# Patient Record
Sex: FEMALE | Race: WHITE | Employment: OTHER | URBAN - METROPOLITAN AREA
[De-identification: names, ages, dates, MRNs, and addresses within clinical notes are randomized per-mention and may not be internally consistent; named-entity substitution may affect disease eponyms.]

---

## 2017-01-04 ENCOUNTER — TELEPHONE (OUTPATIENT)
Dept: FAMILY MEDICINE CLINIC | Age: 77
End: 2017-01-04

## 2017-01-06 ENCOUNTER — OFFICE VISIT (OUTPATIENT)
Dept: URGENT CARE | Age: 77
End: 2017-01-06

## 2017-01-06 VITALS
BODY MASS INDEX: 23.21 KG/M2 | SYSTOLIC BLOOD PRESSURE: 110 MMHG | DIASTOLIC BLOOD PRESSURE: 64 MMHG | HEART RATE: 80 BPM | OXYGEN SATURATION: 98 % | RESPIRATION RATE: 12 BRPM | TEMPERATURE: 98.7 F | WEIGHT: 131 LBS | HEIGHT: 63 IN

## 2017-01-06 DIAGNOSIS — J06.9 VIRAL UPPER RESPIRATORY ILLNESS: Primary | ICD-10-CM

## 2017-01-06 PROCEDURE — 99202 OFFICE O/P NEW SF 15 MIN: CPT | Performed by: PHYSICIAN ASSISTANT

## 2017-01-06 RX ORDER — METHYLPREDNISOLONE 4 MG/1
TABLET ORAL
Qty: 1 KIT | Refills: 0 | Status: SHIPPED | OUTPATIENT
Start: 2017-01-06 | End: 2017-01-23 | Stop reason: ALTCHOICE

## 2017-01-06 ASSESSMENT — ENCOUNTER SYMPTOMS
NAUSEA: 0
WHEEZING: 0
COUGH: 1
DIARRHEA: 0
SORE THROAT: 1
SPUTUM PRODUCTION: 0
SHORTNESS OF BREATH: 0
VOMITING: 0
ABDOMINAL PAIN: 0

## 2017-01-19 ENCOUNTER — TELEPHONE (OUTPATIENT)
Dept: FAMILY MEDICINE CLINIC | Age: 77
End: 2017-01-19

## 2017-01-23 ENCOUNTER — OFFICE VISIT (OUTPATIENT)
Dept: FAMILY MEDICINE CLINIC | Age: 77
End: 2017-01-23

## 2017-01-23 VITALS
WEIGHT: 140 LBS | HEIGHT: 63 IN | HEART RATE: 77 BPM | SYSTOLIC BLOOD PRESSURE: 114 MMHG | BODY MASS INDEX: 24.8 KG/M2 | OXYGEN SATURATION: 97 % | DIASTOLIC BLOOD PRESSURE: 72 MMHG | TEMPERATURE: 98.3 F | RESPIRATION RATE: 16 BRPM

## 2017-01-23 DIAGNOSIS — J01.10 ACUTE FRONTAL SINUSITIS, RECURRENCE NOT SPECIFIED: Primary | ICD-10-CM

## 2017-01-23 DIAGNOSIS — R53.83 FATIGUE, UNSPECIFIED TYPE: ICD-10-CM

## 2017-01-23 DIAGNOSIS — D64.9 ANEMIA, UNSPECIFIED TYPE: ICD-10-CM

## 2017-01-23 LAB
HCT VFR BLD CALC: 35.1 % (ref 36–48)
HEMOGLOBIN: 11.3 G/DL (ref 12–16)
MCH RBC QN AUTO: 26.2 PG (ref 26–34)
MCHC RBC AUTO-ENTMCNC: 32.3 G/DL (ref 31–36)
MCV RBC AUTO: 81 FL (ref 80–100)
PDW BLD-RTO: 14.2 % (ref 12.4–15.4)
PLATELET # BLD: 318 K/UL (ref 135–450)
PMV BLD AUTO: 9 FL (ref 5–10.5)
RBC # BLD: 4.33 M/UL (ref 4–5.2)
WBC # BLD: 5.9 K/UL (ref 4–11)

## 2017-01-23 PROCEDURE — 99213 OFFICE O/P EST LOW 20 MIN: CPT | Performed by: FAMILY MEDICINE

## 2017-01-23 PROCEDURE — 36415 COLL VENOUS BLD VENIPUNCTURE: CPT | Performed by: FAMILY MEDICINE

## 2017-01-23 RX ORDER — AZITHROMYCIN 250 MG/1
TABLET, FILM COATED ORAL
Qty: 1 PACKET | Refills: 0 | Status: SHIPPED | OUTPATIENT
Start: 2017-01-23 | End: 2017-01-31 | Stop reason: ALTCHOICE

## 2017-01-23 RX ORDER — FLUTICASONE PROPIONATE 50 MCG
SPRAY, SUSPENSION (ML) NASAL
Qty: 1 BOTTLE | Refills: 5 | Status: SHIPPED | OUTPATIENT
Start: 2017-01-23 | End: 2017-01-31 | Stop reason: SDUPTHER

## 2017-01-23 ASSESSMENT — ENCOUNTER SYMPTOMS
WHEEZING: 0
SINUS PRESSURE: 1
COUGH: 1
APNEA: 0
TROUBLE SWALLOWING: 0
STRIDOR: 0
EYE ITCHING: 1
SORE THROAT: 1
FACIAL SWELLING: 0
RHINORRHEA: 1

## 2017-01-31 ENCOUNTER — OFFICE VISIT (OUTPATIENT)
Dept: DERMATOLOGY | Age: 77
End: 2017-01-31

## 2017-01-31 DIAGNOSIS — L57.0 ACTINIC KERATOSES: Primary | ICD-10-CM

## 2017-01-31 DIAGNOSIS — L30.9 DERMATITIS: ICD-10-CM

## 2017-01-31 DIAGNOSIS — Z12.83 SCREENING EXAM FOR SKIN CANCER: ICD-10-CM

## 2017-01-31 PROCEDURE — 17000 DESTRUCT PREMALG LESION: CPT | Performed by: DERMATOLOGY

## 2017-01-31 PROCEDURE — 99214 OFFICE O/P EST MOD 30 MIN: CPT | Performed by: DERMATOLOGY

## 2017-01-31 PROCEDURE — 17003 DESTRUCT PREMALG LES 2-14: CPT | Performed by: DERMATOLOGY

## 2017-01-31 RX ORDER — FLUOCINONIDE 0.5 MG/G
OINTMENT TOPICAL
Qty: 30 G | Refills: 1 | Status: SHIPPED | OUTPATIENT
Start: 2017-01-31 | End: 2019-03-01 | Stop reason: SDUPTHER

## 2017-02-06 ENCOUNTER — OFFICE VISIT (OUTPATIENT)
Dept: FAMILY MEDICINE CLINIC | Age: 77
End: 2017-02-06

## 2017-02-06 VITALS
HEIGHT: 63 IN | OXYGEN SATURATION: 96 % | TEMPERATURE: 98.8 F | SYSTOLIC BLOOD PRESSURE: 116 MMHG | RESPIRATION RATE: 16 BRPM | HEART RATE: 60 BPM | DIASTOLIC BLOOD PRESSURE: 74 MMHG

## 2017-02-06 DIAGNOSIS — J30.2 SEASONAL ALLERGIC RHINITIS, UNSPECIFIED ALLERGIC RHINITIS TRIGGER: ICD-10-CM

## 2017-02-06 DIAGNOSIS — Z86.69 HISTORY OF ITCHING OF EYE: Primary | ICD-10-CM

## 2017-02-06 PROCEDURE — 99213 OFFICE O/P EST LOW 20 MIN: CPT | Performed by: FAMILY MEDICINE

## 2017-02-06 RX ORDER — TOBRAMYCIN 3 MG/ML
2 SOLUTION/ DROPS OPHTHALMIC EVERY 4 HOURS
Qty: 5 ML | Refills: 1 | Status: SHIPPED | OUTPATIENT
Start: 2017-02-06 | End: 2017-02-06 | Stop reason: SDUPTHER

## 2017-02-06 RX ORDER — TOBRAMYCIN 3 MG/ML
2 SOLUTION/ DROPS OPHTHALMIC EVERY 4 HOURS
Qty: 5 ML | Refills: 1 | Status: SHIPPED | OUTPATIENT
Start: 2017-02-06 | End: 2018-06-08

## 2017-02-06 ASSESSMENT — ENCOUNTER SYMPTOMS
RHINORRHEA: 1
PHOTOPHOBIA: 0
EYE DISCHARGE: 0
EYE ITCHING: 1
EYE PAIN: 0
EYE REDNESS: 1
FACIAL SWELLING: 0

## 2017-02-13 ENCOUNTER — OFFICE VISIT (OUTPATIENT)
Dept: ORTHOPEDIC SURGERY | Age: 77
End: 2017-02-13

## 2017-02-13 VITALS
HEIGHT: 62 IN | SYSTOLIC BLOOD PRESSURE: 130 MMHG | BODY MASS INDEX: 25.76 KG/M2 | DIASTOLIC BLOOD PRESSURE: 74 MMHG | HEART RATE: 78 BPM | WEIGHT: 140 LBS

## 2017-02-13 DIAGNOSIS — M20.41 HAMMER TOE OF RIGHT FOOT: Primary | ICD-10-CM

## 2017-02-13 PROCEDURE — 99212 OFFICE O/P EST SF 10 MIN: CPT | Performed by: PODIATRIST

## 2017-03-20 ENCOUNTER — OFFICE VISIT (OUTPATIENT)
Dept: ORTHOPEDIC SURGERY | Age: 77
End: 2017-03-20

## 2017-03-20 VITALS
DIASTOLIC BLOOD PRESSURE: 77 MMHG | HEIGHT: 62 IN | SYSTOLIC BLOOD PRESSURE: 130 MMHG | WEIGHT: 130 LBS | BODY MASS INDEX: 23.92 KG/M2 | HEART RATE: 69 BPM

## 2017-03-20 DIAGNOSIS — M17.0 ARTHRITIS OF BOTH KNEES: Primary | ICD-10-CM

## 2017-03-20 PROCEDURE — 99213 OFFICE O/P EST LOW 20 MIN: CPT | Performed by: ORTHOPAEDIC SURGERY

## 2017-03-21 ENCOUNTER — TELEPHONE (OUTPATIENT)
Dept: ORTHOPEDIC SURGERY | Age: 77
End: 2017-03-21

## 2017-04-28 ENCOUNTER — NURSE ONLY (OUTPATIENT)
Dept: ORTHOPEDIC SURGERY | Age: 77
End: 2017-04-28

## 2017-04-28 VITALS
HEIGHT: 62 IN | SYSTOLIC BLOOD PRESSURE: 129 MMHG | DIASTOLIC BLOOD PRESSURE: 75 MMHG | WEIGHT: 130 LBS | HEART RATE: 85 BPM | BODY MASS INDEX: 23.92 KG/M2

## 2017-04-28 DIAGNOSIS — M17.0 PRIMARY OSTEOARTHRITIS OF BOTH KNEES: Primary | ICD-10-CM

## 2017-04-28 PROCEDURE — 20610 DRAIN/INJ JOINT/BURSA W/O US: CPT | Performed by: PHYSICIAN ASSISTANT

## 2017-05-04 ENCOUNTER — NURSE ONLY (OUTPATIENT)
Dept: ORTHOPEDIC SURGERY | Age: 77
End: 2017-05-04

## 2017-05-04 VITALS — HEIGHT: 62 IN | WEIGHT: 130 LBS | BODY MASS INDEX: 23.92 KG/M2

## 2017-05-04 DIAGNOSIS — M17.0 PRIMARY OSTEOARTHRITIS OF BOTH KNEES: Primary | ICD-10-CM

## 2017-05-04 PROCEDURE — 20610 DRAIN/INJ JOINT/BURSA W/O US: CPT | Performed by: PHYSICIAN ASSISTANT

## 2017-05-12 ENCOUNTER — NURSE ONLY (OUTPATIENT)
Dept: ORTHOPEDIC SURGERY | Age: 77
End: 2017-05-12

## 2017-05-12 VITALS — BODY MASS INDEX: 24.45 KG/M2 | WEIGHT: 138 LBS | HEIGHT: 63 IN

## 2017-05-12 DIAGNOSIS — M17.0 PRIMARY OSTEOARTHRITIS OF BOTH KNEES: Primary | ICD-10-CM

## 2017-05-12 PROCEDURE — 20610 DRAIN/INJ JOINT/BURSA W/O US: CPT | Performed by: PHYSICIAN ASSISTANT

## 2017-07-06 ENCOUNTER — TELEPHONE (OUTPATIENT)
Dept: DERMATOLOGY | Age: 77
End: 2017-07-06

## 2017-08-14 ENCOUNTER — OFFICE VISIT (OUTPATIENT)
Dept: FAMILY MEDICINE CLINIC | Age: 77
End: 2017-08-14

## 2017-08-14 VITALS
DIASTOLIC BLOOD PRESSURE: 78 MMHG | WEIGHT: 140 LBS | SYSTOLIC BLOOD PRESSURE: 122 MMHG | TEMPERATURE: 97.9 F | HEART RATE: 80 BPM | RESPIRATION RATE: 16 BRPM | BODY MASS INDEX: 25.76 KG/M2 | OXYGEN SATURATION: 98 % | HEIGHT: 62 IN

## 2017-08-14 DIAGNOSIS — M25.562 CHRONIC PAIN OF LEFT KNEE: ICD-10-CM

## 2017-08-14 DIAGNOSIS — I65.22 STENOSIS OF LEFT CAROTID ARTERY: ICD-10-CM

## 2017-08-14 DIAGNOSIS — G89.29 CHRONIC PAIN OF LEFT KNEE: ICD-10-CM

## 2017-08-14 DIAGNOSIS — D64.9 ANEMIA, UNSPECIFIED TYPE: Primary | ICD-10-CM

## 2017-08-14 PROCEDURE — 99213 OFFICE O/P EST LOW 20 MIN: CPT | Performed by: FAMILY MEDICINE

## 2017-08-14 RX ORDER — DICLOFENAC SODIUM 75 MG/1
75 TABLET, DELAYED RELEASE ORAL 2 TIMES DAILY
Qty: 60 TABLET | Refills: 5 | Status: SHIPPED | OUTPATIENT
Start: 2017-08-14 | End: 2017-08-28 | Stop reason: ALTCHOICE

## 2017-08-25 ENCOUNTER — HOSPITAL ENCOUNTER (OUTPATIENT)
Dept: OTHER | Age: 77
Discharge: HOME | End: 2017-08-25
Attending: FAMILY MEDICINE | Admitting: FAMILY MEDICINE

## 2017-08-25 LAB
A/G RATIO: 1.2 (ref 1.1–2.2)
ALBUMIN SERPL-MCNC: 3.8 G/DL (ref 3.4–5)
ALP BLD-CCNC: 51 U/L (ref 40–129)
ALT SERPL-CCNC: 12 U/L (ref 10–40)
ANION GAP SERPL CALCULATED.3IONS-SCNC: 13 MMOL/L (ref 3–16)
AST SERPL-CCNC: 18 U/L (ref 15–37)
BASOPHILS ABSOLUTE: 0.1 K/UL (ref 0–0.2)
BASOPHILS RELATIVE PERCENT: 1.1 %
BILIRUB SERPL-MCNC: 0.3 MG/DL (ref 0–1)
BUN BLDV-MCNC: 19 MG/DL (ref 7–20)
CALCIUM SERPL-MCNC: 9.1 MG/DL (ref 8.3–10.6)
CHLORIDE BLD-SCNC: 103 MMOL/L (ref 99–110)
CHOLESTEROL, TOTAL: 186 MG/DL (ref 0–199)
CO2: 26 MMOL/L (ref 21–32)
CREAT SERPL-MCNC: 0.6 MG/DL (ref 0.6–1.2)
EOSINOPHILS ABSOLUTE: 0.5 K/UL (ref 0–0.6)
EOSINOPHILS RELATIVE PERCENT: 8.9 %
GFR AFRICAN AMERICAN: >60
GFR NON-AFRICAN AMERICAN: >60
GLOBULIN: 3.3 G/DL
GLUCOSE BLD-MCNC: 65 MG/DL (ref 70–99)
HCT VFR BLD CALC: 36.5 % (ref 36–48)
HDLC SERPL-MCNC: 70 MG/DL (ref 40–60)
HEMOGLOBIN: 11.9 G/DL (ref 12–16)
LDL CHOLESTEROL CALCULATED: 101 MG/DL
LYMPHOCYTES ABSOLUTE: 1.5 K/UL (ref 1–5.1)
LYMPHOCYTES RELATIVE PERCENT: 26.2 %
MCH RBC QN AUTO: 26.5 PG (ref 26–34)
MCHC RBC AUTO-ENTMCNC: 32.6 G/DL (ref 31–36)
MCV RBC AUTO: 81.4 FL (ref 80–100)
MONOCYTES ABSOLUTE: 0.6 K/UL (ref 0–1.3)
MONOCYTES RELATIVE PERCENT: 9.5 %
NEUTROPHILS ABSOLUTE: 3.2 K/UL (ref 1.7–7.7)
NEUTROPHILS RELATIVE PERCENT: 54.3 %
PDW BLD-RTO: 16.1 % (ref 12.4–15.4)
PLATELET # BLD: 259 K/UL (ref 135–450)
PMV BLD AUTO: 9.5 FL (ref 5–10.5)
POTASSIUM SERPL-SCNC: 3.9 MMOL/L (ref 3.5–5.1)
RBC # BLD: 4.48 M/UL (ref 4–5.2)
SODIUM BLD-SCNC: 142 MMOL/L (ref 136–145)
TOTAL PROTEIN: 7.1 G/DL (ref 6.4–8.2)
TRIGL SERPL-MCNC: 74 MG/DL (ref 0–150)
VLDLC SERPL CALC-MCNC: 15 MG/DL
WBC # BLD: 5.8 K/UL (ref 4–11)

## 2017-08-28 ENCOUNTER — OFFICE VISIT (OUTPATIENT)
Dept: DERMATOLOGY | Age: 77
End: 2017-08-28

## 2017-08-28 DIAGNOSIS — L82.1 SEBORRHEIC KERATOSES: ICD-10-CM

## 2017-08-28 DIAGNOSIS — Z41.1 ELECTIVE PROCEDURE FOR UNACCEPTABLE COSMETIC APPEARANCE: ICD-10-CM

## 2017-08-28 DIAGNOSIS — L57.0 ACTINIC KERATOSES: Primary | ICD-10-CM

## 2017-08-28 PROCEDURE — 1711000 COSMETIC-DESTRUCT B9 LESION 1-14: Performed by: DERMATOLOGY

## 2017-08-28 PROCEDURE — 17000 DESTRUCT PREMALG LESION: CPT | Performed by: DERMATOLOGY

## 2017-08-28 PROCEDURE — 17003 DESTRUCT PREMALG LES 2-14: CPT | Performed by: DERMATOLOGY

## 2017-09-18 ENCOUNTER — HOSPITAL ENCOUNTER (OUTPATIENT)
Dept: MAMMOGRAPHY | Age: 77
Discharge: HOME | End: 2017-09-18
Admitting: FAMILY MEDICINE

## 2017-09-18 DIAGNOSIS — Z12.31 ENCOUNTER FOR SCREENING MAMMOGRAM FOR BREAST CANCER: ICD-10-CM

## 2017-09-18 DIAGNOSIS — M85.80 OSTEOPENIA, UNSPECIFIED LOCATION: ICD-10-CM

## 2017-12-18 ENCOUNTER — OFFICE VISIT (OUTPATIENT)
Dept: URGENT CARE | Age: 77
End: 2017-12-18

## 2017-12-18 VITALS
DIASTOLIC BLOOD PRESSURE: 70 MMHG | WEIGHT: 142 LBS | SYSTOLIC BLOOD PRESSURE: 120 MMHG | TEMPERATURE: 98.5 F | RESPIRATION RATE: 20 BRPM | HEIGHT: 63 IN | HEART RATE: 72 BPM | BODY MASS INDEX: 25.16 KG/M2

## 2017-12-18 DIAGNOSIS — R10.84 GENERALIZED ABDOMINAL PAIN: Primary | ICD-10-CM

## 2017-12-18 LAB
BILIRUBIN, POC: ABNORMAL
BLOOD URINE, POC: ABNORMAL
CLARITY, POC: CLEAR
COLOR, POC: ABNORMAL
GLUCOSE URINE, POC: ABNORMAL
KETONES, POC: ABNORMAL
LEUKOCYTE EST, POC: ABNORMAL
NITRITE, POC: ABNORMAL
PH, POC: 6.5
PROTEIN, POC: ABNORMAL
SPECIFIC GRAVITY, POC: 1.01
UROBILINOGEN, POC: 0.2

## 2017-12-18 PROCEDURE — 81002 URINALYSIS NONAUTO W/O SCOPE: CPT | Performed by: EMERGENCY MEDICINE

## 2017-12-18 PROCEDURE — 99214 OFFICE O/P EST MOD 30 MIN: CPT | Performed by: EMERGENCY MEDICINE

## 2017-12-18 RX ORDER — CIPROFLOXACIN 500 MG/1
500 TABLET, FILM COATED ORAL 2 TIMES DAILY
Qty: 20 TABLET | Refills: 0 | Status: SHIPPED | OUTPATIENT
Start: 2017-12-18 | End: 2017-12-28

## 2017-12-18 ASSESSMENT — ENCOUNTER SYMPTOMS
VOMITING: 0
CONSTIPATION: 1
HEMATOCHEZIA: 0
DIARRHEA: 0
NAUSEA: 0
BACK PAIN: 0
COUGH: 0
EYES NEGATIVE: 1
FLATUS: 1
ABDOMINAL PAIN: 1
SHORTNESS OF BREATH: 0
BLOOD IN STOOL: 0

## 2017-12-18 NOTE — PATIENT INSTRUCTIONS
RX: CIPRO    Follow-up with your primary care physician in 1 week. If you do not have a primary care physician, call [phone] for a referral or visit [email or web address]      Patient Education        Abdominal Pain: Care Instructions  Your Care Instructions    Abdominal pain has many possible causes. Some aren't serious and get better on their own in a few days. Others need more testing and treatment. If your pain continues or gets worse, you need to be rechecked and may need more tests to find out what is wrong. You may need surgery to correct the problem. Don't ignore new symptoms, such as fever, nausea and vomiting, urination problems, pain that gets worse, and dizziness. These may be signs of a more serious problem. Your doctor may have recommended a follow-up visit in the next 8 to 12 hours. If you are not getting better, you may need more tests or treatment. The doctor has checked you carefully, but problems can develop later. If you notice any problems or new symptoms, get medical treatment right away. Follow-up care is a key part of your treatment and safety. Be sure to make and go to all appointments, and call your doctor if you are having problems. It's also a good idea to know your test results and keep a list of the medicines you take. How can you care for yourself at home? · Rest until you feel better. · To prevent dehydration, drink plenty of fluids, enough so that your urine is light yellow or clear like water. Choose water and other caffeine-free clear liquids until you feel better. If you have kidney, heart, or liver disease and have to limit fluids, talk with your doctor before you increase the amount of fluids you drink. · If your stomach is upset, eat mild foods, such as rice, dry toast or crackers, bananas, and applesauce. Try eating several small meals instead of two or three large ones.   · Wait until 48 hours after all symptoms have gone away before you have spicy foods, alcohol, and drinks that contain caffeine. · Do not eat foods that are high in fat. · Avoid anti-inflammatory medicines such as aspirin, ibuprofen (Advil, Motrin), and naproxen (Aleve). These can cause stomach upset. Talk to your doctor if you take daily aspirin for another health problem. When should you call for help? Call 911 anytime you think you may need emergency care. For example, call if:  ? · You passed out (lost consciousness). ? · You pass maroon or very bloody stools. ? · You vomit blood or what looks like coffee grounds. ? · You have new, severe belly pain. ?Call your doctor now or seek immediate medical care if:  ? · Your pain gets worse, especially if it becomes focused in one area of your belly. ? · You have a new or higher fever. ? · Your stools are black and look like tar, or they have streaks of blood. ? · You have unexpected vaginal bleeding. ? · You have symptoms of a urinary tract infection. These may include:  ¨ Pain when you urinate. ¨ Urinating more often than usual.  ¨ Blood in your urine. ? · You are dizzy or lightheaded, or you feel like you may faint. ? Watch closely for changes in your health, and be sure to contact your doctor if:  ? · You are not getting better after 1 day (24 hours). Where can you learn more? Go to [email or web address]://[email or web address]. org and sign in to your appssavvy account. Enter T908 in the [facility] box to learn more about \"Abdominal Pain: Care Instructions. \"     If you do not have an account, please click on the \"Sign Up Now\" link. Current as of: March 20, 2017  Content Version: 11.4  © 0453-3719 [employer]. Care instructions adapted under license by [facility] ([facility]). If you have questions about a medical condition or this instruction, always ask your healthcare professional. [address] any warranty or liability for your use of this information.

## 2017-12-18 NOTE — PROGRESS NOTES
Reason for Visit:   Chief Complaint   Patient presents with    Abdominal Pain     keeps coming back stomach ache, for the 3 weeks. Patient History     HPI:    Abdominal Pain   This is a new problem. Episode onset: over the past 3 weeks. The problem occurs intermittently. The problem has been waxing and waning. The pain is located in the generalized abdominal region. The pain is mild. The quality of the pain is aching, dull and cramping. Associated symptoms include constipation and flatus. Pertinent negatives include no anorexia, diarrhea, dysuria, fever, frequency, headaches, hematochezia, hematuria, melena, nausea or vomiting. Nothing aggravates the pain. The pain is relieved by nothing. Treatments tried: Pepcid complete. The treatment provided no relief. There is no history of abdominal surgery. Past Medical History:   Diagnosis Date    Arthritis     Asthma     mild    Knee pain, right     PONV (postoperative nausea and vomiting)     Reflux     Seasonal allergies        Past Surgical History:   Procedure Laterality Date    COLONOSCOPY  2009    COLONOSCOPY  9/17/14    polyp    DILATION AND CURETTAGE OF UTERUS      FOOT SURGERY Bilateral [id number]    rt ft 3rd digit proximalinterphalangeal joint arthrodesis/lt  ft 5th/2nd proximal interphalangeal joint arthrodesis    KNEE ARTHROSCOPY      TONSILLECTOMY      UPPER GASTROINTESTINAL ENDOSCOPY  3/15/13       Allergies: Allergies   Allergen Reactions    Cephalexin      Vomiting    Escitalopram Oxalate      Headache    Erythromycin Nausea And Vomiting         Review of Systems       Review of Systems   Constitutional: Negative for chills and fever. HENT: Negative. Eyes: Negative. Respiratory: Negative for cough and shortness of breath. Cardiovascular: Negative for chest pain. Gastrointestinal: Positive for abdominal pain, constipation and flatus.  Negative for anorexia, blood in stool, diarrhea, hematochezia, melena, nausea and vomiting. Genitourinary: Negative for difficulty urinating, dysuria, frequency and hematuria. Musculoskeletal: Negative for back pain. Skin: Negative for rash. Neurological: Negative for headaches. Physical Exam     Vitals:    12/18/17 1217   BP: 120/70   Pulse: 72   Resp: 20   Temp: 98.5 °F (36.9 °C)       Physical Exam   Constitutional: She is oriented to person, place, and time. She appears well-developed and well-nourished. No distress. Cardiovascular: Normal rate, regular rhythm and normal heart sounds. Exam reveals no gallop and no friction rub. No murmur heard. Pulmonary/Chest: Effort normal and breath sounds normal. No accessory muscle usage. No respiratory distress. She has no decreased breath sounds. She has no wheezes. She has no rhonchi. She has no rales. Abdominal: Soft. Bowel sounds are normal. She exhibits no distension and no mass. There is no hepatosplenomegaly. There is no tenderness. There is no rigidity, no rebound and no guarding. Neurological: She is alert and oriented to person, place, and time. She has normal strength. No sensory deficit. Skin: Skin is warm and dry. No rash noted. Psychiatric: She has a normal mood and affect. Her behavior is normal.       Procedure:     Results for POC orders placed in visit on 12/18/17   POCT Urinalysis no Micro   Result Value Ref Range    Color, UA dark yellow     Clarity, UA clear     Glucose, UA POC neg     Bilirubin, UA neg     Ketones, UA neg     Spec Grav, UA 1.015     Blood, UA POC moderate     pH, UA 6.5     Protein, UA POC neg     Urobilinogen, UA 0.2     Leukocytes, UA small     Nitrite, UA neg        Urine culture is pending      Assessment:    1. Generalized abdominal pain        Will treat for UTI.   Urine culture is pending      I estimate there is LOW risk for ACUTE APPENDICITIS, BOWEL OBSTRUCTION, CHOLECYSTITIS, DIVERTICULITIS, INCARCERATED HERNIA, PANCREATITIS, or PERFORATED BOWEL or ULCER, thus I consider the discharge disposition reasonable. Also, there is no evidence or peritonitis, sepsis, or toxicity. The patient and/or family and I have discussed the diagnosis and risks, and we agree with discharging home to follow-up with their primary doctor. We also discussed going to the Emergency Department immediately if new or worsening symptoms occur. We have discussed the symptoms which are most concerning (e.g., bloody stool, fever, changing or worsening pain, vomiting) that necessitate immediate return. Plan:    Orders Placed This Encounter   Medications    ciprofloxacin (CIPRO) 500 MG tablet     Sig: Take 1 tablet by mouth 2 times daily for 10 days     Dispense:  20 tablet     Refill:  0         Patient Instructions     RX: CIPRO    Follow-up with your primary care physician in 1 week. If you do not have a primary care physician, call [phone] for a referral or visit [email or web address]      Patient Education        Abdominal Pain: Care Instructions  Your Care Instructions    Abdominal pain has many possible causes. Some aren't serious and get better on their own in a few days. Others need more testing and treatment. If your pain continues or gets worse, you need to be rechecked and may need more tests to find out what is wrong. You may need surgery to correct the problem. Don't ignore new symptoms, such as fever, nausea and vomiting, urination problems, pain that gets worse, and dizziness. These may be signs of a more serious problem. Your doctor may have recommended a follow-up visit in the next 8 to 12 hours. If you are not getting better, you may need more tests or treatment. The doctor has checked you carefully, but problems can develop later. If you notice any problems or new symptoms, get medical treatment right away. Follow-up care is a key part of your treatment and safety.  Be sure to make and go to all appointments, and call your health, and be sure to contact your doctor if:  ? · You are not getting better after 1 day (24 hours). Where can you learn more? Go to https://[email or web address] [email or web address]. org and sign in to your [employer] account. Enter G012 in the [employer] box to learn more about \"Abdominal Pain: Care Instructions. \"     If you do not have an account, please click on the \"Sign Up Now\" link. Current as of: March 20, 2017  Content Version: 11.4  © 5610-9295 Healthwise, Incorporated. Care instructions adapted under license by [facility] ([facility]). If you have questions about a medical condition or this instruction, always ask your healthcare professional. [address] any warranty or liability for your use of this information.

## 2017-12-20 LAB — URINE CULTURE, ROUTINE: NORMAL

## 2017-12-29 ENCOUNTER — OFFICE VISIT (OUTPATIENT)
Dept: FAMILY MEDICINE CLINIC | Age: 77
End: 2017-12-29

## 2017-12-29 VITALS
TEMPERATURE: 98.4 F | WEIGHT: 144 LBS | OXYGEN SATURATION: 97 % | DIASTOLIC BLOOD PRESSURE: 78 MMHG | BODY MASS INDEX: 26.5 KG/M2 | HEART RATE: 84 BPM | HEIGHT: 62 IN | SYSTOLIC BLOOD PRESSURE: 130 MMHG

## 2017-12-29 DIAGNOSIS — R31.9 URINARY TRACT INFECTION WITH HEMATURIA, SITE UNSPECIFIED: ICD-10-CM

## 2017-12-29 DIAGNOSIS — B96.89 ACUTE BACTERIAL SINUSITIS: Primary | ICD-10-CM

## 2017-12-29 DIAGNOSIS — N39.0 URINARY TRACT INFECTION WITH HEMATURIA, SITE UNSPECIFIED: ICD-10-CM

## 2017-12-29 DIAGNOSIS — J01.90 ACUTE BACTERIAL SINUSITIS: Primary | ICD-10-CM

## 2017-12-29 LAB
BILIRUBIN, POC: ABNORMAL
BLOOD URINE, POC: ABNORMAL
CLARITY, POC: CLEAR
COLOR, POC: YELLOW
GLUCOSE URINE, POC: ABNORMAL
KETONES, POC: ABNORMAL
LEUKOCYTE EST, POC: ABNORMAL
NITRITE, POC: ABNORMAL
PH, POC: 7.5
PROTEIN, POC: ABNORMAL
SPECIFIC GRAVITY, POC: 1.01
UROBILINOGEN, POC: 0.2

## 2017-12-29 PROCEDURE — 99214 OFFICE O/P EST MOD 30 MIN: CPT | Performed by: FAMILY MEDICINE

## 2017-12-29 PROCEDURE — 81002 URINALYSIS NONAUTO W/O SCOPE: CPT | Performed by: FAMILY MEDICINE

## 2017-12-29 RX ORDER — AMOXICILLIN AND CLAVULANATE POTASSIUM 875; 125 MG/1; MG/1
1 TABLET, FILM COATED ORAL 2 TIMES DAILY
Qty: 20 TABLET | Refills: 0 | Status: SHIPPED | OUTPATIENT
Start: 2017-12-29 | End: 2018-01-08

## 2017-12-29 ASSESSMENT — ENCOUNTER SYMPTOMS
SINUS PRESSURE: 1
COUGH: 1
HOARSE VOICE: 0
SHORTNESS OF BREATH: 0
SWOLLEN GLANDS: 1
SORE THROAT: 1

## 2017-12-29 NOTE — PROGRESS NOTES
exhibits no discharge. No scleral icterus. Neck: Normal range of motion. Neck supple. No tracheal deviation present. No thyromegaly present. Cardiovascular: Normal rate, regular rhythm and normal heart sounds. Exam reveals no gallop and no friction rub. No murmur heard. Pulmonary/Chest: Effort normal and breath sounds normal. No respiratory distress. She has no wheezes. She has no rales. She exhibits no tenderness. Lymphadenopathy:     She has cervical adenopathy. Skin: No rash noted. She is not diaphoretic. Nursing note and vitals reviewed. Assessment:      Sinusitis, recent UTI      Plan:      Augmentin and supportive care over-the-counter for the sinusitis. Drink plenty fluids. The UA was completely normal except for microscopic blood. Given that the patient is asymptomatic I would recommend having the urine repeated when she establishes care with Dr. Nurys Silva. Mireille Bonilla was seen today for cough, generalized body aches and headache. Diagnoses and all orders for this visit:    Acute bacterial sinusitis    Urinary tract infection with hematuria, site unspecified  -     POCT Urinalysis no Micro    Other orders  -     amoxicillin-clavulanate (AUGMENTIN) 875-125 MG per tablet;  Take 1 tablet by mouth 2 times daily for 10 days

## 2018-01-22 ENCOUNTER — OFFICE VISIT (OUTPATIENT)
Dept: FAMILY MEDICINE CLINIC | Age: 78
End: 2018-01-22

## 2018-01-22 VITALS
HEART RATE: 74 BPM | WEIGHT: 144 LBS | DIASTOLIC BLOOD PRESSURE: 69 MMHG | SYSTOLIC BLOOD PRESSURE: 120 MMHG | BODY MASS INDEX: 26.5 KG/M2 | HEIGHT: 62 IN

## 2018-01-22 DIAGNOSIS — Z23 NEED FOR 23-POLYVALENT PNEUMOCOCCAL POLYSACCHARIDE VACCINE: ICD-10-CM

## 2018-01-22 DIAGNOSIS — J45.30 MILD PERSISTENT ASTHMA WITHOUT COMPLICATION: ICD-10-CM

## 2018-01-22 DIAGNOSIS — R31.9 HEMATURIA, UNSPECIFIED TYPE: Primary | ICD-10-CM

## 2018-01-22 LAB
BILIRUBIN, POC: ABNORMAL
BLOOD URINE, POC: ABNORMAL
CLARITY, POC: CLEAR
COLOR, POC: YELLOW
EPITHELIAL CELLS, UA: 1 /HPF (ref 0–5)
GLUCOSE URINE, POC: ABNORMAL
HYALINE CASTS: 1 /LPF (ref 0–8)
KETONES, POC: ABNORMAL
LEUKOCYTE EST, POC: ABNORMAL
NITRITE, POC: ABNORMAL
PH, POC: 7
PROTEIN, POC: ABNORMAL
RBC UA: 1 /HPF (ref 0–4)
SPECIFIC GRAVITY, POC: 1.01
UROBILINOGEN, POC: 0.2
WBC UA: 0 /HPF (ref 0–5)

## 2018-01-22 PROCEDURE — 90732 PPSV23 VACC 2 YRS+ SUBQ/IM: CPT | Performed by: FAMILY MEDICINE

## 2018-01-22 PROCEDURE — G0009 ADMIN PNEUMOCOCCAL VACCINE: HCPCS | Performed by: FAMILY MEDICINE

## 2018-01-22 PROCEDURE — 81002 URINALYSIS NONAUTO W/O SCOPE: CPT | Performed by: FAMILY MEDICINE

## 2018-01-22 PROCEDURE — 99214 OFFICE O/P EST MOD 30 MIN: CPT | Performed by: FAMILY MEDICINE

## 2018-01-22 RX ORDER — CLOBETASOL PROPIONATE 0.05 MG/G
GEL TOPICAL
COMMUNITY
Start: 2017-11-30 | End: 2019-04-03 | Stop reason: ALTCHOICE

## 2018-01-22 ASSESSMENT — ENCOUNTER SYMPTOMS
SHORTNESS OF BREATH: 0
ABDOMINAL PAIN: 0

## 2018-01-22 ASSESSMENT — PATIENT HEALTH QUESTIONNAIRE - PHQ9
SUM OF ALL RESPONSES TO PHQ9 QUESTIONS 1 & 2: 0
SUM OF ALL RESPONSES TO PHQ QUESTIONS 1-9: 0
2. FEELING DOWN, DEPRESSED OR HOPELESS: 0
1. LITTLE INTEREST OR PLEASURE IN DOING THINGS: 0

## 2018-01-22 NOTE — PROGRESS NOTES
Chief Complaint   Patient presents with    Establish Care    Hematuria     (hx) requesting ua         HPI      65 y.o. female presents today to [facility]. Patient feels well and has no acute complaints. She had a UA 12/18/17 which showed blood in her urine at that time she was treated for a UTI. UA was repeated 12/29/17 and the hematuria was still present. Today it is present as well. Denies history of smoking. She denies any vaginal bleeding. Reports some whitish-clear discharge. She denies any nausea, vomiting, dysuria or abdominal pain. Hx of asthma. Using the albuterol and advair every other day. Son who lives with her has 2 cats and she is allergic to cats.       Work 3 days a week as a     Patient Active Problem List   Diagnosis    AR (allergic rhinitis)    Asthma    Recurrent UTI    Hammer toe    Anemia    Hematuria    Left hamstring muscle strain    Numbness and tingling in right hand    Left knee pain    Hammer toe of right foot     Past Medical History:   Diagnosis Date    Arthritis     Asthma     mild    Knee pain, right     PONV (postoperative nausea and vomiting)     Reflux     Seasonal allergies        Past Surgical History:   Procedure Laterality Date    COLONOSCOPY  2009    COLONOSCOPY  9/17/14    polyp    DILATION AND CURETTAGE OF UTERUS      FOOT SURGERY Bilateral [id number]    rt ft 3rd digit proximalinterphalangeal joint arthrodesis/lt  ft 5th/2nd proximal interphalangeal joint arthrodesis    KNEE ARTHROSCOPY      TONSILLECTOMY      UPPER GASTROINTESTINAL ENDOSCOPY  3/15/13     Most Recent Immunizations   Administered Date(s) Administered    Influenza Virus Vaccine 10/23/2016    Influenza, High Dose 10/30/2017    Pneumococcal 13-valent Conjugate ([id number]) 11/11/2015    Tdap (Boostrix, Adacel) 06/12/2014    Zoster 11/04/2012        Current Outpatient Prescriptions   Medication Sig Dispense Refill    CALCIUM PO Take by mouth      Multiple

## 2018-01-22 NOTE — PATIENT INSTRUCTIONS
have pus in your urine. ¨ You have pain in your back just below your rib cage. This is called flank pain. ¨ You have a fever, chills, or body aches. ¨ It hurts to urinate. ¨ You have groin or belly pain. ? · You have more blood in your urine. ? Watch closely for changes in your health, and be sure to contact your doctor if:  ? · You have new urination problems. ? · You do not get better as expected. Where can you learn more? Go to [email or web address]://[email or web address] org and sign in to your avocadostore account. Enter P455 in the [employer] box to learn more about \"Blood in the Urine: Care Instructions. \"     If you do not have an account, please click on the \"Sign Up Now\" link. Current as of: May 12, 2017  Content Version: 11.5  © 9768-6024 Healthwise, [employer]olution. Care instructions adapted under license by [facility] ([facility]). If you have questions about a medical condition or this instruction, always ask your healthcare professional. [address] any warranty or liability for your use of this information.

## 2018-02-12 ENCOUNTER — OFFICE VISIT (OUTPATIENT)
Dept: DERMATOLOGY | Age: 78
End: 2018-02-12

## 2018-02-12 DIAGNOSIS — L57.0 ACTINIC KERATOSES: Primary | ICD-10-CM

## 2018-02-12 DIAGNOSIS — Z12.83 SCREENING EXAM FOR SKIN CANCER: ICD-10-CM

## 2018-02-12 PROCEDURE — 17000 DESTRUCT PREMALG LESION: CPT | Performed by: DERMATOLOGY

## 2018-02-12 PROCEDURE — 17003 DESTRUCT PREMALG LES 2-14: CPT | Performed by: DERMATOLOGY

## 2018-02-12 PROCEDURE — 99213 OFFICE O/P EST LOW 20 MIN: CPT | Performed by: DERMATOLOGY

## 2018-02-12 NOTE — PROGRESS NOTES
[facility]) Dermatology  Remedios Cummings, [address]  1940    65 y.o. female     Date of Visit: 2/12/2018    Last Visit: 1yr    Chief Complaint: Skin check    History of Present Illness:  1. Here for skin check. History of actinic keratoses s/p cryotherapy. Reports rough lesions on face.   -Avoids sun as much as possible. Derm History:   -Dermatitis - lidex oint (no improvement w/ clobetasol gel)    Review of Systems:  Constitutional: Reports general sense of well-being. Skin: No interval severe sunburns. Allergies: Reviewed and updated. Past Medical History, Surgical History, Medications and Allergies reviewed.      Past Medical History:   Diagnosis Date    Arthritis     Asthma     mild    Knee pain, right     PONV (postoperative nausea and vomiting)     Reflux     Seasonal allergies      Past Surgical History:   Procedure Laterality Date    COLONOSCOPY  2009    COLONOSCOPY  9/17/14    polyp    DILATION AND CURETTAGE OF UTERUS      FOOT SURGERY Bilateral [id number]    rt ft 3rd digit proximalinterphalangeal joint arthrodesis/lt  ft 5th/2nd proximal interphalangeal joint arthrodesis    KNEE ARTHROSCOPY      TONSILLECTOMY      UPPER GASTROINTESTINAL ENDOSCOPY  3/15/13       Allergies   Allergen Reactions    Cephalexin      Vomiting    Escitalopram Oxalate      Headache    Erythromycin Nausea And Vomiting     Outpatient Prescriptions Marked as Taking for the 2/12/18 encounter (Office Visit) with Remedios Cummings MD   Medication Sig Dispense Refill    CALCIUM PO Take by mouth      Multiple Vitamins-Minerals (CENTRUM SILVER 50+WOMEN) TABS Take by mouth      ADVAIR DISKUS 100-50 MCG/DOSE diskus inhaler INHALE ONE PUFF BY MOUTH EVERY 12 HOURS 1 Inhaler 1    tobramycin (TOBREX) 0.3 % ophthalmic solution Place 2 drops into both eyes every 4 hours 5 mL 1    albuterol (VENTOLIN HFA) 108 (90 BASE) MCG/ACT inhaler Inhale 2 puffs into the lungs every 4 hours as needed for

## 2018-02-26 ENCOUNTER — OFFICE VISIT (OUTPATIENT)
Dept: FAMILY MEDICINE CLINIC | Age: 78
End: 2018-02-26

## 2018-02-26 VITALS
TEMPERATURE: 97.6 F | HEART RATE: 71 BPM | HEIGHT: 62 IN | SYSTOLIC BLOOD PRESSURE: 123 MMHG | BODY MASS INDEX: 26.87 KG/M2 | WEIGHT: 146 LBS | OXYGEN SATURATION: 100 % | DIASTOLIC BLOOD PRESSURE: 76 MMHG

## 2018-02-26 DIAGNOSIS — Z01.818 PRE-OP EXAM: Primary | ICD-10-CM

## 2018-02-26 PROCEDURE — 93000 ELECTROCARDIOGRAM COMPLETE: CPT | Performed by: FAMILY MEDICINE

## 2018-02-26 PROCEDURE — 99214 OFFICE O/P EST MOD 30 MIN: CPT | Performed by: FAMILY MEDICINE

## 2018-02-26 NOTE — PROGRESS NOTES
breathing, good air exchange, clear to auscultation bilaterally, no crackles or wheezing    Abdomen:  No scars, normal bowel sounds, soft, non-distended, non-tender, no masses palpated, no hepatosplenomegally    Extremities:  No clubbing, cyanosis, or edema    NEUROLOGIC:  Awake, alert, oriented to name, place and time. Cranial nerves II-XII are grossly intact. Motor is 5 out of 5 bilaterally. DATA:  EKG:  Date:  2/26/18  I have reviewed EKG with the following interpretation:  Impression:  Sinus rhythm wnl. CBC:   Lab Results   Component Value Date    WBC 5.8 08/25/2017    RBC 4.48 08/25/2017    HGB 11.9 08/25/2017    HCT 36.5 08/25/2017    MCV 81.4 08/25/2017    MCH 26.5 08/25/2017    MCHC 32.6 08/25/2017    RDW 16.1 08/25/2017     08/25/2017    MPV 9.5 08/25/2017     CBC with Differential:    Lab Results   Component Value Date    WBC 5.8 08/25/2017    RBC 4.48 08/25/2017    HGB 11.9 08/25/2017    HCT 36.5 08/25/2017     08/25/2017    MCV 81.4 08/25/2017    MCH 26.5 08/25/2017    MCHC 32.6 08/25/2017    RDW 16.1 08/25/2017    SEGSPCT 45.9 09/24/2011    LYMPHOPCT 26.2 08/25/2017    MONOPCT 9.5 08/25/2017    EOSPCT 10.1 09/24/2011    BASOPCT 1.1 08/25/2017    MONOSABS 0.6 08/25/2017    LYMPHSABS 1.5 08/25/2017    EOSABS 0.5 08/25/2017    BASOSABS 0.1 08/25/2017    DIFFTYPE Auto 09/24/2011     CMP:    Lab Results   Component Value Date     08/25/2017    K 3.9 08/25/2017     08/25/2017    CO2 26 08/25/2017    BUN 19 08/25/2017    CREATININE 0.6 08/25/2017    GFRAA >60 08/25/2017    GFRAA >60 09/24/2011    AGRATIO 1.2 08/25/2017    LABGLOM >60 08/25/2017    GLUCOSE 65 08/25/2017    PROT 7.1 08/25/2017    PROT 6.1 09/24/2011    LABALBU 3.8 08/25/2017    CALCIUM 9.1 08/25/2017    BILITOT 0.3 08/25/2017    ALKPHOS 51 08/25/2017    AST 18 08/25/2017    ALT 12 08/25/2017       ASSESSMENT AND PLAN:    1.   Patient is a 65 y.o. female with above specified procedure planned on 3/9/18 with

## 2018-03-16 ENCOUNTER — TELEPHONE (OUTPATIENT)
Dept: FAMILY MEDICINE CLINIC | Age: 78
End: 2018-03-16

## 2018-03-16 DIAGNOSIS — R05.9 COUGH: Primary | ICD-10-CM

## 2018-03-16 RX ORDER — GUAIFENESIN/DEXTROMETHORPHAN 100-10MG/5
5 SYRUP ORAL 3 TIMES DAILY PRN
Qty: 354 ML | Refills: 0 | Status: SHIPPED | OUTPATIENT
Start: 2018-03-16 | End: 2018-03-26

## 2018-03-23 ENCOUNTER — OFFICE VISIT (OUTPATIENT)
Dept: FAMILY MEDICINE CLINIC | Age: 78
End: 2018-03-23

## 2018-03-23 ENCOUNTER — TELEPHONE (OUTPATIENT)
Dept: FAMILY MEDICINE CLINIC | Age: 78
End: 2018-03-23

## 2018-03-23 VITALS
DIASTOLIC BLOOD PRESSURE: 71 MMHG | HEIGHT: 62 IN | TEMPERATURE: 97.3 F | OXYGEN SATURATION: 97 % | HEART RATE: 80 BPM | BODY MASS INDEX: 26.87 KG/M2 | WEIGHT: 146 LBS | SYSTOLIC BLOOD PRESSURE: 127 MMHG

## 2018-03-23 DIAGNOSIS — R05.9 COUGH: Primary | ICD-10-CM

## 2018-03-23 PROCEDURE — 99213 OFFICE O/P EST LOW 20 MIN: CPT | Performed by: FAMILY MEDICINE

## 2018-03-23 RX ORDER — GUAIFENESIN AND CODEINE PHOSPHATE 100; 10 MG/5ML; MG/5ML
5-10 SOLUTION ORAL 2 TIMES DAILY PRN
Qty: 120 ML | Refills: 0 | Status: SHIPPED | OUTPATIENT
Start: 2018-03-23 | End: 2018-03-30

## 2018-03-23 RX ORDER — SULFAMETHOXAZOLE AND TRIMETHOPRIM 400; 80 MG/1; MG/1
TABLET ORAL
COMMUNITY
Start: 2018-03-09 | End: 2018-06-05

## 2018-03-23 ASSESSMENT — ENCOUNTER SYMPTOMS
RHINORRHEA: 1
COUGH: 1

## 2018-03-23 NOTE — TELEPHONE ENCOUNTER
Pt is calling because her cough is still not going away. Stated she has taken the Robitussin but it is not helping. Stated the phelgm is sometimes yellow and sometimes clear. No other symptoms and she is not sick at all just has the cough. Asking if there is anything else Dr. Navya Villagran can give her to get rid of the cough.

## 2018-03-26 ENCOUNTER — TELEPHONE (OUTPATIENT)
Dept: FAMILY MEDICINE CLINIC | Age: 78
End: 2018-03-26

## 2018-03-26 DIAGNOSIS — J20.9 ACUTE BRONCHITIS, UNSPECIFIED ORGANISM: Primary | ICD-10-CM

## 2018-03-26 RX ORDER — AZITHROMYCIN 250 MG/1
TABLET, FILM COATED ORAL
Qty: 1 PACKET | Refills: 0 | Status: SHIPPED | OUTPATIENT
Start: 2018-03-26 | End: 2018-06-05

## 2018-04-09 ENCOUNTER — OFFICE VISIT (OUTPATIENT)
Dept: ORTHOPEDIC SURGERY | Age: 78
End: 2018-04-09

## 2018-04-09 VITALS
BODY MASS INDEX: 24.45 KG/M2 | WEIGHT: 138 LBS | DIASTOLIC BLOOD PRESSURE: 71 MMHG | SYSTOLIC BLOOD PRESSURE: 130 MMHG | HEIGHT: 63 IN | HEART RATE: 84 BPM

## 2018-04-09 DIAGNOSIS — M17.0 PRIMARY OSTEOARTHRITIS OF BOTH KNEES: ICD-10-CM

## 2018-04-09 DIAGNOSIS — M25.561 PAIN IN BOTH KNEES, UNSPECIFIED CHRONICITY: Primary | ICD-10-CM

## 2018-04-09 DIAGNOSIS — M25.562 PAIN IN BOTH KNEES, UNSPECIFIED CHRONICITY: Primary | ICD-10-CM

## 2018-04-09 PROCEDURE — 99213 OFFICE O/P EST LOW 20 MIN: CPT | Performed by: ORTHOPAEDIC SURGERY

## 2018-04-18 ENCOUNTER — TELEPHONE (OUTPATIENT)
Dept: ORTHOPEDIC SURGERY | Age: 78
End: 2018-04-18

## 2018-04-30 ENCOUNTER — NURSE ONLY (OUTPATIENT)
Dept: ORTHOPEDIC SURGERY | Age: 78
End: 2018-04-30

## 2018-04-30 VITALS — WEIGHT: 138 LBS | HEIGHT: 63 IN | BODY MASS INDEX: 24.45 KG/M2

## 2018-04-30 DIAGNOSIS — M17.12 PRIMARY OSTEOARTHRITIS OF LEFT KNEE: Primary | ICD-10-CM

## 2018-04-30 PROCEDURE — 20610 DRAIN/INJ JOINT/BURSA W/O US: CPT | Performed by: PHYSICIAN ASSISTANT

## 2018-05-11 ENCOUNTER — NURSE ONLY (OUTPATIENT)
Dept: ORTHOPEDIC SURGERY | Age: 78
End: 2018-05-11

## 2018-05-11 VITALS — WEIGHT: 138 LBS | BODY MASS INDEX: 24.45 KG/M2 | HEIGHT: 63 IN

## 2018-05-11 DIAGNOSIS — M17.12 PRIMARY OSTEOARTHRITIS OF LEFT KNEE: Primary | ICD-10-CM

## 2018-05-11 PROCEDURE — 20610 DRAIN/INJ JOINT/BURSA W/O US: CPT | Performed by: PHYSICIAN ASSISTANT

## 2018-05-21 ENCOUNTER — OFFICE VISIT (OUTPATIENT)
Dept: ORTHOPEDIC SURGERY | Age: 78
End: 2018-05-21

## 2018-05-21 VITALS — HEIGHT: 63 IN | BODY MASS INDEX: 24.45 KG/M2 | WEIGHT: 138.01 LBS

## 2018-05-21 DIAGNOSIS — M17.12 PRIMARY OSTEOARTHRITIS OF LEFT KNEE: Primary | ICD-10-CM

## 2018-05-21 PROCEDURE — 20610 DRAIN/INJ JOINT/BURSA W/O US: CPT | Performed by: ORTHOPAEDIC SURGERY

## 2018-05-24 ENCOUNTER — TELEPHONE (OUTPATIENT)
Dept: FAMILY MEDICINE CLINIC | Age: 78
End: 2018-05-24

## 2018-05-30 ENCOUNTER — TELEPHONE (OUTPATIENT)
Dept: CASE MANAGEMENT | Age: 78
End: 2018-05-30

## 2018-06-05 ENCOUNTER — OFFICE VISIT (OUTPATIENT)
Dept: FAMILY MEDICINE CLINIC | Age: 78
End: 2018-06-05

## 2018-06-05 VITALS
HEART RATE: 74 BPM | DIASTOLIC BLOOD PRESSURE: 84 MMHG | BODY MASS INDEX: 26.05 KG/M2 | WEIGHT: 147 LBS | OXYGEN SATURATION: 97 % | SYSTOLIC BLOOD PRESSURE: 128 MMHG

## 2018-06-05 DIAGNOSIS — Z01.818 PRE-OP EXAM: Primary | ICD-10-CM

## 2018-06-05 PROCEDURE — 99214 OFFICE O/P EST MOD 30 MIN: CPT | Performed by: FAMILY MEDICINE

## 2018-06-05 PROCEDURE — 93000 ELECTROCARDIOGRAM COMPLETE: CPT | Performed by: FAMILY MEDICINE

## 2018-06-11 ENCOUNTER — TELEPHONE (OUTPATIENT)
Dept: FAMILY MEDICINE CLINIC | Age: 78
End: 2018-06-11

## 2018-06-11 NOTE — TELEPHONE ENCOUNTER
Patient called and states that she was seen for a pre-op H&P last Tuesday. Patient states that she had blood work drawn. She said that that she got a call from the hospital asking where she got her blood work done at . Patient states that she got it drawn at our office. Patient states that she has to find her blood work. She said that it might keep her have having her surgery done. I do not see the lab results in Epic. Please advise.

## 2018-06-13 ENCOUNTER — TELEPHONE (OUTPATIENT)
Dept: ORTHOPEDIC SURGERY | Age: 78
End: 2018-06-13

## 2018-06-18 ENCOUNTER — TELEPHONE (OUTPATIENT)
Dept: ORTHOPEDIC SURGERY | Age: 78
End: 2018-06-18

## 2018-06-19 PROBLEM — M17.11 ARTHRITIS OF RIGHT KNEE: Status: ACTIVE | Noted: 2018-06-19

## 2018-06-20 PROBLEM — Z96.652 STATUS POST TOTAL LEFT KNEE REPLACEMENT: Status: ACTIVE | Noted: 2018-06-20

## 2018-06-26 ENCOUNTER — TELEPHONE (OUTPATIENT)
Dept: ORTHOPEDIC SURGERY | Age: 78
End: 2018-06-26

## 2018-06-27 ENCOUNTER — TELEPHONE (OUTPATIENT)
Dept: ORTHOPEDIC SURGERY | Age: 78
End: 2018-06-27

## 2018-06-27 DIAGNOSIS — M17.11 PRIMARY OSTEOARTHRITIS OF RIGHT KNEE: Primary | ICD-10-CM

## 2018-07-02 ENCOUNTER — OFFICE VISIT (OUTPATIENT)
Dept: ORTHOPEDIC SURGERY | Age: 78
End: 2018-07-02

## 2018-07-02 DIAGNOSIS — M25.561 RIGHT KNEE PAIN, UNSPECIFIED CHRONICITY: Primary | ICD-10-CM

## 2018-07-02 PROCEDURE — 99024 POSTOP FOLLOW-UP VISIT: CPT | Performed by: ORTHOPAEDIC SURGERY

## 2018-07-02 NOTE — PROGRESS NOTES
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 Date of surgery: 06/19/2018    History of Present Illness:  Zen Castillo is a 77 y.o. female here for first follow-up regarding their right total knee arthroplasty. Patient is doing well overall. Pain is controlled with oral pain medication and denies any fever, chills or wound drainage. Has begun physical therapy in-home and is progressing well. Patient is ambulating with walker today. A long leg knee immobilizer was placed the day of surgery. Patient is not wearing it today. Vital Signs: There were no vitals filed for this visit. Pain Assessment:         Knee Examination:  Inspection:  No gross deformities noted. Swelling noted, but no ecchymosis or erythema. Incision site is clean, dry, intact and well-healing. Palpation:  Minimal tenderness to palpation. Mild effusion. Range of Motion:  Lacking a few degrees of full extension but can flex comfortably to approximately 90°    Strength: Improving strength noted    Special Tests:  No instability is noted today. Skin: There are no rashes, ulcerations or lesions. Gait: Using walker for ambulation assistance          XR KNEE RIGHT (1-2 VIEWS)  Diagnostic Test Findings:  Xrays obtained and reviewed in office. 2 views of the right knee show normal post-operative changes following a total knee arthroplasty. No signs of hardware loosening. 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 Impression:  Encounter Diagnosis   Name Primary?  Right knee pain, unspecified chronicity Yes       Office Procedures:  Orders Placed This Encounter   Procedures    XR KNEE RIGHT (1-2 VIEWS)       Treatment Plan:    Patient will begin outpatient physical therapy the end of this week or next week.   Follow-up in 4 weeks    Holger Sifuentes PA-C, scribing for Dr. Lisbeth Miranda        This dictation was performed with a verbal recognition program [facility]) and it was checked for errors. It is possible that there are still dictated errors within this office note. If so, please bring any errors to my attention for an addendum. All efforts were made to ensure that this office note is accurate.

## 2018-07-09 ENCOUNTER — NURSE ONLY (OUTPATIENT)
Dept: FAMILY MEDICINE CLINIC | Age: 78
End: 2018-07-09

## 2018-07-09 ENCOUNTER — TELEPHONE (OUTPATIENT)
Dept: FAMILY MEDICINE CLINIC | Age: 78
End: 2018-07-09

## 2018-07-09 ENCOUNTER — HOSPITAL ENCOUNTER (OUTPATIENT)
Dept: PHYSICAL THERAPY | Age: 78
Discharge: HOME | End: 2018-07-10
Admitting: ORTHOPAEDIC SURGERY

## 2018-07-09 DIAGNOSIS — D50.9 MICROCYTIC ANEMIA: Primary | ICD-10-CM

## 2018-07-09 LAB
BASOPHILS ABSOLUTE: 0.1 K/UL (ref 0–0.2)
BASOPHILS RELATIVE PERCENT: 1.3 %
EOSINOPHILS ABSOLUTE: 0.2 K/UL (ref 0–0.6)
EOSINOPHILS RELATIVE PERCENT: 3.7 %
FERRITIN: 136.8 NG/ML (ref 15–150)
HCT VFR BLD CALC: 32.3 % (ref 36–48)
HEMOGLOBIN: 10.6 G/DL (ref 12–16)
IRON SATURATION: 9 % (ref 15–50)
IRON: 32 UG/DL (ref 37–145)
LYMPHOCYTES ABSOLUTE: 1.5 K/UL (ref 1–5.1)
LYMPHOCYTES RELATIVE PERCENT: 27.3 %
MCH RBC QN AUTO: 25.2 PG (ref 26–34)
MCHC RBC AUTO-ENTMCNC: 32.7 G/DL (ref 31–36)
MCV RBC AUTO: 76.9 FL (ref 80–100)
MONOCYTES ABSOLUTE: 0.7 K/UL (ref 0–1.3)
MONOCYTES RELATIVE PERCENT: 12.6 %
NEUTROPHILS ABSOLUTE: 3 K/UL (ref 1.7–7.7)
NEUTROPHILS RELATIVE PERCENT: 55.1 %
PDW BLD-RTO: 17.2 % (ref 12.4–15.4)
PLATELET # BLD: 657 K/UL (ref 135–450)
PMV BLD AUTO: 8 FL (ref 5–10.5)
RBC # BLD: 4.2 M/UL (ref 4–5.2)
TOTAL IRON BINDING CAPACITY: 347 UG/DL (ref 260–445)
WBC # BLD: 5.5 K/UL (ref 4–11)

## 2018-07-09 PROCEDURE — 36415 COLL VENOUS BLD VENIPUNCTURE: CPT | Performed by: FAMILY MEDICINE

## 2018-07-09 NOTE — FLOWSHEET NOTE
Strength Quad Poor +     ABD      flexion          RESTRICTIONS/PRECAUTIONS: Osteopenia    Exercises/Interventions:     Therapeutic Ex Sets/reps Notes   Long sitting HS stretch 4 x 20\" HEP   gastroc belt stretch 4 x 20\" HEP   Quad set 10 x 5\" HEP   Seated heel slide with belt 10 x 5\" HEP   LAQ 10 x  HEP   EOB knee AA flexion 10 x 5\" HEP   SLR 10 x  HEP                       HR/ TR reviewed HEP   Mini Squats Reviewed  HEP                  Manual Intervention     Patella mobs, PROM, gs/hs stretch X 10 minutes                             NMR re-education                                                      Therapeutic Exercise and NMR EXR  [x] (83518) Provided verbal/tactile cueing for activities related to strengthening, flexibility, endurance, ROM for improvements in LE, proximal hip, and core control with self care, mobility, lifting, ambulation.  [] (28643) Provided verbal/tactile cueing for activities related to improving balance, coordination, kinesthetic sense, posture, motor skill, proprioception  to assist with LE, proximal hip, and core control in self care, mobility, lifting, ambulation and eccentric single leg control.      NMR and Therapeutic Activities:    [x] (17111 or 58462) Provided verbal/tactile cueing for activities related to improving balance, coordination, kinesthetic sense, posture, motor skill, proprioception and motor activation to allow for proper function of core, proximal hip and LE with self care and ADLs  [] (16534) Gait Re-education- Provided training and instruction to the patient for proper LE, core and proximal hip recruitment and positioning and eccentric body weight control with ambulation re-education including up and down stairs     Home Exercise Program:    [x] (64872) Reviewed/Progressed HEP activities related to strengthening, flexibility, endurance, ROM of core, proximal hip and LE for functional self-care, mobility, lifting and ambulation/stair navigation   []

## 2018-07-09 NOTE — TELEPHONE ENCOUNTER
She states she is tired when she walks down the sinha.  She will come in today for the blood test after her appointment at [address] [place] [address]

## 2018-07-09 NOTE — PROGRESS NOTES
Patient came into the office per physician's request for the following blood test(s): cbc, iron & TIBC, ferritin.       Blood drawn in office by Myrtle Tanner    # of tubes sent: 1 sst 1 lav

## 2018-07-09 NOTE — TELEPHONE ENCOUNTER
Called to check and see how patient was doing and she said she was okay. She knows that if things get worse she needs to go to the ER. I let her know I will try to call her back with her lab results before 5 today if not it may be first thing tomorrow morning or someone on call might.

## 2018-07-09 NOTE — PLAN OF CARE
[facility], [address]  Phone: [phone]  Fax [phone]     Physical Therapy Certification    Dear Referring Practitioner: Dr. Annel Long,    We had the pleasure of evaluating the following patient for physical therapy services at [address] [place] [address]. A summary of our findings can be found in the initial assessment below. This includes our plan of care. If you have any questions or concerns regarding these findings, please do not hesitate to contact me at the office phone number checked above. Thank you for the referral.       Physician Signature:_______________________________Date:__________________  By signing above (or electronic signature), therapists plan is approved by physician    Patient: Betty Greenwood   : 1940   MRN: [id number]  Referring Physician: Referring Practitioner: Dr. Annel Long      Evaluation Date: 2018      Medical Diagnosis Information:  Diagnosis: Right Knee OA (M17.11) s/p Right TKR 18   Treatment Diagnosis: Right Knee Pain (M25.561) / Difficulty Walking (R26.2) / Right Knee Stiffness (M25.661)                                         Insurance information: PT Insurance Information: [employer] (check visits)     Precautions/ Contra-indications:   Latex Allergy:  [x]NO      []YES  Preferred Language for Healthcare:   [x]English       []other:    SUBJECTIVE: Patient stated complaint: Patient reports progressive knee pain. Attempted cortisone and Euflexxa injections with little relief. Prior to surgery was having pain constantly, difficulty ambulating stairs, decrease quality of life. Elected to pursue sx for TKR 18. Followed hospital with 4 visits of home PT. Compliant with HEP 1-2 x day. Taking small walks around the home. Using rolling walker outside of the home and no AD inside the home. Lives in bilevel home but only to get in / out. Step to gait with railing on stairs. Taking Tylenol for pain control and Tramadol as needed. Patient states does not tolerate medicine well. Son lives with her. Feels weak and whole body exhausted. Difficulty sleeping, starting to sleep in bed. Ice 4-5 x day. Relevant Medical History:Osteopenia, OA  Functional Disability Index:PT G-Codes  Functional Assessment Tool Used: LEFS  Score: 74%  Functional Limitation: Mobility: Walking and moving around  Mobility: Walking and Moving Around Current Status (): At least 60 percent but less than 80 percent impaired, limited or restricted  Mobility: Walking and Moving Around Goal Status ():  At least 40 percent but less than 60 percent impaired, limited or restricted    Pain Scale: 5/10  Easing factors: ice, activity modification, slight bend of knee, medication  Provocative factors: Prior to sx: stairs, walking, sleep, decreased QOL  Now: extension of knee     Type: [x]Constant   []Intermittent  []Radiating []Localized []other:     Numbness/Tingling: Denies n/t     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    Living Status/Prior Level of Function: Independent with ADLs and IADLs, lives with son on bilevel home with 6 stairs to enter    OBJECTIVE:     ROM LEFT RIGHT   Knee ext Lacking 6 deg Lacking 2 deg   Knee Flex  101 deg   Strength  LEFT RIGHT   Knee EXT (quad) Fair + Poor +   Knee Flex (HS)          Circumference  Mid patella   38 cm    41.5 cm     Reflexes/Sensation:  NT   []Dermatomes/Myotomes intact    []Reflexes equal and normal bilaterally   []Other:    Joint mobility:    []Normal    [x]Hypo   []Hyper    Palpation:  hamstrings tightness    Functional Mobility/Transfers: SBA for stand to sit transfers and getting on exercise plinth     Posture: Flexed knee in long sitting, uncomfortable with extension    Bandages/Dressings/Incisions: Incision healing well    Gait: (include devices/WB status) Ambulates into clinic with quad rolling walker, achieving close to full TKE    Orthopedic Special Tests: NA                       [x] Patient history, allergies, meds reviewed. Medical chart reviewed. See intake form. Review Of Systems (ROS):  [x]Performed Review of systems (Integumentary, CardioPulmonary, Neurological) by intake and observation. Intake form has been scanned into medical record. Patient has been instructed to contact their primary care physician regarding ROS issues if not already being addressed at this time. Co-morbidities/Complexities (which will affect course of rehabilitation):   []None           Arthritic conditions   []Rheumatoid arthritis (M05.9)  [x]Osteoarthritis (M19.91)   Cardiovascular conditions   []Hypertension (I10)  []Hyperlipidemia (E78.5)  []Angina pectoris (I20)  []Atherosclerosis (I70)   Musculoskeletal conditions   []Disc pathology   []Congenital spine pathologies   []Prior surgical intervention  []Osteoporosis (M81.8)  [x]Osteopenia (M85.8)   Endocrine conditions   []Hypothyroid (E03.9)  []Hyperthyroid Gastrointestinal conditions   []Constipation (G38.37)   Metabolic conditions   []Morbid obesity (E66.01)  []Diabetes type 1(E10.65) or 2 (E11.65)   []Neuropathy (G60.9)     Pulmonary conditions   [x]Asthma (J45)  []Coughing   []COPD (J44.9)   Psychological Disorders  []Anxiety (F41.9)  []Depression (F32.9)   []Other:   []Other:          Barriers to/and or personal factors that will affect rehab potential:              [x]Age  []Sex              []Motivation/Lack of Motivation                        []Co-Morbidities              []Cognitive Function, education/learning barriers              []Environmental, home barriers              []profession/work barriers  []past PT/medical experience  []other:  Justification: Patient is a healthy 77year old female who was fairly active prior to surgery and should tolerate rehab process well without significant barrriers.      Falls Risk Assessment (30 days):   [x] Falls Risk assessed and no intervention required. [] Falls Risk assessed and Patient requires intervention due to being higher risk   TUG score (>12s at risk):     [] Falls education provided, including       G-Codes:  PT G-Codes  Functional Assessment Tool Used: LEFS  Score: 74%  Functional Limitation: Mobility: Walking and moving around  Mobility: Walking and Moving Around Current Status (): At least 60 percent but less than 80 percent impaired, limited or restricted  Mobility: Walking and Moving Around Goal Status (): At least 40 percent but less than 60 percent impaired, limited or restricted    ASSESSMENT:   Functional Impairments:     [x]Noted lumbar/proximal hip/LE joint hypomobility   [x]Decreased LE functional ROM   [x]Decreased core/proximal hip strength and neuromuscular control   [x]Decreased LE functional strength   [x]Reduced balance/proprioceptive control   []other:      Functional Activity Limitations (from functional questionnaire and intake)   [x]Reduced ability to tolerate prolonged functional positions   [x]Reduced ability or difficulty with changes of positions or transfers between positions   [x]Reduced ability to maintain good posture and demonstrate good body mechanics with sitting, bending, and lifting   [x]Reduced ability to sleep   [x] Reduced ability or tolerance with driving and/or computer work   [x]Reduced ability to perform lifting, carrying tasks   [x]Reduced ability to squat   [x]Reduced ability to forward bend   [x]Reduced ability to ambulate prolonged functional periods/distances/surfaces   [x]Reduced ability to ascend/descend stairs   []Reduced ability to run, hop, cut or jump   []other:    Participation Restrictions   [x]Reduced participation in self care activities   [x]Reduced participation in home management activities   [x]Reduced participation in work activities   []Reduced participation in social activities.    []Reduced participation

## 2018-07-09 NOTE — TELEPHONE ENCOUNTER
Her previous labs are showing anemia. Is she able to come to the lab today for recheck? Any sob or difficulty breathing?  If unable to come today for blood work and her symptoms worsen she should go to the ER

## 2018-07-12 ENCOUNTER — HOSPITAL ENCOUNTER (OUTPATIENT)
Dept: PHYSICAL THERAPY | Age: 78
Discharge: HOME | End: 2018-07-13
Admitting: ORTHOPAEDIC SURGERY

## 2018-07-12 NOTE — FLOWSHEET NOTE
much.    OBJECTIVE:   Observation: patient ambulating into clinic with cane in right hand  Palpation: tight hamstrings and medial quad     Test used Initial score Current Score   Pain Summary VAS 5/10    Functional questionnaire LEFS 74%    ROM flexion 101 deg  97 deg 7/12     extension 0 deg after stretching    Strength Quad Poor +     ABD      flexion          RESTRICTIONS/PRECAUTIONS: Osteopenia    Exercises/Interventions:     Therapeutic Ex Sets/reps Notes   Long sitting HS stretch 4 x 20\" HEP   gastroc belt stretch 4 x 20\" HEP   Quad set 10 x 5\" HEP   Seated heel slide with belt/HS ball 10 x 5\" HEP   LAQ 10 x  HEP   EOB knee AA flexion 10 x 5\" HEP   SLR 10 x  HEP   bike 4' Unable to make revolution                  HR/ TR HEP   Mini Squats HEP                  Manual Intervention     Patella mobs, PROM, gs/hs stretch X 10'    STM hamstrings, back of knee X 4' SL   SL quad stretch X 2'    Extension mobs X 1'              NMR re-education                                                      Therapeutic Exercise and NMR EXR  [x] (51724) Provided verbal/tactile cueing for activities related to strengthening, flexibility, endurance, ROM for improvements in LE, proximal hip, and core control with self care, mobility, lifting, ambulation.  [] (88535) Provided verbal/tactile cueing for activities related to improving balance, coordination, kinesthetic sense, posture, motor skill, proprioception  to assist with LE, proximal hip, and core control in self care, mobility, lifting, ambulation and eccentric single leg control.      NMR and Therapeutic Activities:    [x] (93354 or 55742) Provided verbal/tactile cueing for activities related to improving balance, coordination, kinesthetic sense, posture, motor skill, proprioception and motor activation to allow for proper function of core, proximal hip and LE with self care and ADLs  [] (06673) Gait Re-education- Provided training and instruction to the patient for proper LE,

## 2018-07-16 ENCOUNTER — HOSPITAL ENCOUNTER (OUTPATIENT)
Dept: PHYSICAL THERAPY | Age: 78
Setting detail: THERAPIES SERIES
Discharge: HOME | End: 2018-07-16
Payer: MEDICARE

## 2018-07-16 PROCEDURE — 97110 THERAPEUTIC EXERCISES: CPT | Performed by: PHYSICAL THERAPIST

## 2018-07-16 PROCEDURE — 97140 MANUAL THERAPY 1/> REGIONS: CPT | Performed by: PHYSICAL THERAPIST

## 2018-07-16 PROCEDURE — 97016 VASOPNEUMATIC DEVICE THERAPY: CPT | Performed by: PHYSICAL THERAPIST

## 2018-07-16 NOTE — FLOWSHEET NOTE
ADLs as indicated by Functional Deficits. PROGRESSING     Long Term Goals: To be achieved in: 12 weeks  1. Disability index score of 45% or less for the LEFS to assist with reaching prior level of function. 2. Patient will demonstrate increased AROM to 0-120 deg to allow for proper joint functioning as indicated by patients Functional Deficits. PROGRESSING  3. Patient will demonstrate an increase in Strength to good proximal hip strength and control, 4+/5 MMT LE to allow for proper functional mobility as indicated by patients Functional Deficits. PROGRESSING  4. Patient will return to ADLs and functional activities without increased symptoms or restriction. 5. Patient will return to work in 4-6 hour shifts. 6. Patient will ambulate with symmetrical gait pattern, reciprocally ascending stairs. New or Updated Goals (if applicable):  [x] No change to goals established upon initial eval/last progress note:  New Goals:    Progression Towards Functional goals:   [x] Patient is progressing as expected towards functional goals listed. [] Progression is slowed due to complexities listed. [] Progression has been slowed due to co-morbidities. [] Plan just implemented, too soon to assess goals progression  [] Other:     ASSESSMENT:    [x] Improvement noted relative to goals:  [] No Improvement noted related to goals:  Summary/Patient's response to treatment: Patient demonstrated increased tolerance to treatment today. Patient's ROM progressing well. Patient tolerated bike better this session compared to last. Will trial making a revolution next session. Continue MH/ bike in beginning of session and add exercises as patient tolerates.      Treatment/Activity Tolerance:  [x] Patient tolerated treatment well [] Patient limited by fatique  [] Patient limited by pain  [] Patient limited by other medical complications  [] Other:     Prognosis: [x] Good [] Fair  [] Poor    Patient Requires Follow-up: [x] Yes  [] No    PLAN:   [x] Continue per plan of car e [] Alter current plan (see comments)  [] Plan of care initiated [] Hold pending MD visit [] Discharge    Electronically signed by: Sheridan Shone, PT    Pratibha Case, IGNACIO  Therapist was present, directed the patient's care, made skilled judgement, and was responsible for assessment and treatment of the patient.

## 2018-07-17 ENCOUNTER — TELEPHONE (OUTPATIENT)
Dept: ORTHOPEDIC SURGERY | Age: 78
End: 2018-07-17

## 2018-07-18 ENCOUNTER — OFFICE VISIT (OUTPATIENT)
Dept: FAMILY MEDICINE CLINIC | Age: 78
End: 2018-07-18

## 2018-07-18 VITALS
OXYGEN SATURATION: 98 % | HEART RATE: 95 BPM | WEIGHT: 138 LBS | DIASTOLIC BLOOD PRESSURE: 70 MMHG | BODY MASS INDEX: 24.45 KG/M2 | SYSTOLIC BLOOD PRESSURE: 114 MMHG

## 2018-07-18 DIAGNOSIS — D75.839 THROMBOCYTOSIS: ICD-10-CM

## 2018-07-18 DIAGNOSIS — Z09 HOSPITAL DISCHARGE FOLLOW-UP: Primary | ICD-10-CM

## 2018-07-18 LAB
A/G RATIO: 1.3 (ref 1.1–2.2)
ALBUMIN SERPL-MCNC: 4 G/DL (ref 3.4–5)
ALP BLD-CCNC: 97 U/L (ref 40–129)
ALT SERPL-CCNC: 10 U/L (ref 10–40)
ANION GAP SERPL CALCULATED.3IONS-SCNC: 12 MMOL/L (ref 3–16)
AST SERPL-CCNC: 17 U/L (ref 15–37)
BILIRUB SERPL-MCNC: <0.2 MG/DL (ref 0–1)
BUN BLDV-MCNC: 15 MG/DL (ref 7–20)
CALCIUM SERPL-MCNC: 9.7 MG/DL (ref 8.3–10.6)
CHLORIDE BLD-SCNC: 99 MMOL/L (ref 99–110)
CO2: 29 MMOL/L (ref 21–32)
CREAT SERPL-MCNC: 0.6 MG/DL (ref 0.6–1.2)
GFR AFRICAN AMERICAN: >60
GFR NON-AFRICAN AMERICAN: >60
GLOBULIN: 3.1 G/DL
GLUCOSE BLD-MCNC: 115 MG/DL (ref 70–99)
POTASSIUM SERPL-SCNC: 5.2 MMOL/L (ref 3.5–5.1)
SODIUM BLD-SCNC: 140 MMOL/L (ref 136–145)
TOTAL PROTEIN: 7.1 G/DL (ref 6.4–8.2)

## 2018-07-18 PROCEDURE — 99214 OFFICE O/P EST MOD 30 MIN: CPT | Performed by: FAMILY MEDICINE

## 2018-07-18 PROCEDURE — 1111F DSCHRG MED/CURRENT MED MERGE: CPT | Performed by: FAMILY MEDICINE

## 2018-07-18 PROCEDURE — 36415 COLL VENOUS BLD VENIPUNCTURE: CPT | Performed by: FAMILY MEDICINE

## 2018-07-18 NOTE — PROGRESS NOTES
reviewed- see chart. Post-operatively the patients diet was advanced as tolerated and their incision was checked on POD #1. The incision is dressing in place, clean, dry and intact with no signs of infection. The patient remained neurovascularly intact in the lower extremity and had intact pulses distally. Patients calf remained soft and showed no evidence of DVT. The patient was able to move their leg and ankle/foot without any problems post-operatively. Physical therapy and occupational therapy were consulted and began working with the patient post-operatively. The patient progressed with PT/OT as would be expected and continued to improve through their stay. The patients pain was initially controlled with IV medications but we were able to transition to oral pain medications soon after arrival to the floor and their pain remained under good control through their hospital stay. From a medical standpoint the patient remained stable and continued to have the medicine team follow throughout their stay. The patients dressing was changed/incison was checked on day of d/c. The patient will be discharged at this time to [address] [place] [address]  with their current diet restrictions and will continue to follow the total knee precautions outlined to them by us and PT/OT.      Current status: She is still feeling tired. She states she left the [facility] rehab. She did not like the environment. Felt like they were delaying her rehab treatment. Son came and picked her up. Had home PT currently going to [facility] 2 times per week. She also has concerns regarding elevated platelets on recent blood work      Review of Systems:  A comprehensive review of systems was negative except for what was noted in the HPI.     Physical Exam:  General Appearance: alert and oriented to person, place and time, well developed and well- nourished, in no acute distress  Skin: warm and dry, no rash or erythema  Head: normocephalic

## 2018-07-19 ENCOUNTER — HOSPITAL ENCOUNTER (OUTPATIENT)
Dept: PHYSICAL THERAPY | Age: 78
Setting detail: THERAPIES SERIES
Discharge: HOME | End: 2018-07-19
Payer: MEDICARE

## 2018-07-19 DIAGNOSIS — E87.5 HYPERKALEMIA: Primary | ICD-10-CM

## 2018-07-19 PROCEDURE — 97140 MANUAL THERAPY 1/> REGIONS: CPT | Performed by: PHYSICAL THERAPIST

## 2018-07-19 PROCEDURE — 97110 THERAPEUTIC EXERCISES: CPT | Performed by: PHYSICAL THERAPIST

## 2018-07-19 PROCEDURE — 97016 VASOPNEUMATIC DEVICE THERAPY: CPT | Performed by: PHYSICAL THERAPIST

## 2018-07-19 NOTE — FLOWSHEET NOTE
[facility], [address]  Phone: [phone]  Fax [phone]    Physical Therapy Daily Treatment Note  Date:  2018    Patient Name:  Vita Haji    :  1940  MRN: [id number]  Restrictions/Precautions:    Physician Information:  Referring Practitioner: Dr. Tisha Asher  Medical/Treatment Diagnosis Information:  · Diagnosis: Right Knee OA (M17.11) s/p Right TKR 18  · Treatment Diagnosis: Right Knee Pain (M25.561) / Difficulty Walking (R26.2) / Right Knee Stiffness (M25.661)                    [] Conservative / [x] Surgical - DOS: 18  Therapy Diagnosis/Practice Pattern:  Practice Pattern A: Primary Prevention  Insurance/Certification information:  PT Insurance Information: [facility] (check visits)  Plan of care signed: [] YES  [x] NO  Number of Comorbidities:  []0     [x]1-2    []3+  Date of Patient follow up with Physician:     G-Code (if applicable):      Date G-Code Applied:  18  PT G-Codes  Functional Assessment Tool Used: LEFS  Score: 74%  Functional Limitation: Mobility: Walking and moving around  Mobility: Walking and Moving Around Current Status (): At least 60 percent but less than 80 percent impaired, limited or restricted  Mobility: Walking and Moving Around Goal Status (): At least 40 percent but less than 60 percent impaired, limited or restricted    Progress Note: [x]  Yes  []  No  Next due by: Visit #10        Latex Allergy:  [x]NO      []YES  Preferred Language for Healthcare:   [x]English       []other:    Visit # Insurance Allowable Reporting Period   4 12 visits Orthonet   (KAYDEN) Begin Date: 2018               End Date:      RECERT DUE BY: 12 weeks    SUBJECTIVE:  Patient reports she has been performing exercises 2x/day. Glad it is not feeling any worse, but is getting tired of the stiffness.  Is going to a family reunion 3 hours away this weekend and is limited by fatique  [] Patient limited by pain  [] Patient limited by other medical complications  [] Other:     Prognosis: [x] Good [] Fair  [] Poor    Patient Requires Follow-up: [x] Yes  [] No    PLAN:   [x] Continue per plan of car e [] Alter current plan (see comments)  [] Plan of care initiated [] Hold pending MD visit [] Discharge    Electronically signed by: Bailey Mitchell, PT    Sunshine Garber, SPT  Therapist was present, directed the patient's care, made skilled judgement, and was responsible for assessment and treatment of the patient.

## 2018-07-23 ENCOUNTER — HOSPITAL ENCOUNTER (OUTPATIENT)
Dept: PHYSICAL THERAPY | Age: 78
Setting detail: THERAPIES SERIES
Discharge: HOME | End: 2018-07-23
Payer: MEDICARE

## 2018-07-23 PROCEDURE — 97110 THERAPEUTIC EXERCISES: CPT | Performed by: PHYSICAL THERAPIST

## 2018-07-23 PROCEDURE — 97140 MANUAL THERAPY 1/> REGIONS: CPT | Performed by: PHYSICAL THERAPIST

## 2018-07-23 PROCEDURE — 97016 VASOPNEUMATIC DEVICE THERAPY: CPT | Performed by: PHYSICAL THERAPIST

## 2018-07-24 ENCOUNTER — NURSE ONLY (OUTPATIENT)
Dept: FAMILY MEDICINE CLINIC | Age: 78
End: 2018-07-24

## 2018-07-24 DIAGNOSIS — E87.5 HYPERKALEMIA: Primary | ICD-10-CM

## 2018-07-24 DIAGNOSIS — E87.5 HYPERKALEMIA: ICD-10-CM

## 2018-07-24 LAB — POTASSIUM SERPL-SCNC: 4.2 MMOL/L (ref 3.5–5.1)

## 2018-07-24 PROCEDURE — 36415 COLL VENOUS BLD VENIPUNCTURE: CPT | Performed by: FAMILY MEDICINE

## 2018-07-26 ENCOUNTER — HOSPITAL ENCOUNTER (OUTPATIENT)
Dept: PHYSICAL THERAPY | Age: 78
Setting detail: THERAPIES SERIES
Discharge: HOME | End: 2018-07-26
Payer: MEDICARE

## 2018-07-26 PROCEDURE — 97110 THERAPEUTIC EXERCISES: CPT | Performed by: PHYSICAL THERAPIST

## 2018-07-26 PROCEDURE — 97016 VASOPNEUMATIC DEVICE THERAPY: CPT | Performed by: PHYSICAL THERAPIST

## 2018-07-26 PROCEDURE — 97140 MANUAL THERAPY 1/> REGIONS: CPT | Performed by: PHYSICAL THERAPIST

## 2018-07-26 NOTE — OP NOTE
[facility], [address] [place] [address]  Phone: [phone]  Fax [phone]    · Date: 7/26/2018  Physician: Dr. Tisha Asher  Patient: Vita Haji Diagnosis: Right  Knee OA (M17.11) s/p Right TKR 6/19/18   Treatment Diagnosis: Right Knee Pain (M25.561) / Difficulty Walking (R26.2) / Right Knee Stiffness (M25.661)    Patient has received 6 sessions of Physical Therapy over a 3 week period. Functional Questionnaire Score: Initial 74 %, Current have not reassessed  Pain reported has decreased from 5/10 to 0-5/10    ROM Initial (R) Initial (L) Current (R) Current (L)   flexion 101  119    extension 0  0                         Strength       quad Poor +  fair    abduction   4-    flexion                       Functional Activity Checklist: The patient continues to have moderate difficulty with the following:   [] Personal care  [] Reaching / overhead  [x] Standing    [x] Housework chores  [] Climbing  [] Driving / riding in a vehicle    [] Work  [x] Squatting  [] Bed / vehicle mobility    [x] Lifting  [x] Walking  [] Sleeping    [] Pushing / pulling  [] Sitting  [] Concentrating / reading     Specific Functional Improvement and Impression:  Patient's ROM/strength are progressing well and as expected. Patient has demonstrated increased ability to ascend/descend stairs and tolerate standing exercises. Evident patient is compliant with HEP. Will continue to increase strengthening exercises, functional mobility and begin implementing additional balance work as patient tolerates. 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 Patient indicated she feels ready as she sits the majority of the day, will take breaks to walk around and has purchased ice packs.    Summary:   [x] Patient is progressing as expected for this condition   [] Patient is progressing, but slower than expected for this condition   [] Patient

## 2018-07-26 NOTE — FLOWSHEET NOTE
skill, proprioception and motor activation to allow for proper function of core, proximal hip and LE with self care and ADLs  [] (61306) Gait Re-education- Provided training and instruction to the patient for proper LE, core and proximal hip recruitment and positioning and eccentric body weight control with ambulation re-education including up and down stairs     Home Exercise Program:    [x] (18198) Reviewed/Progressed HEP activities related to strengthening, flexibility, endurance, ROM of core, proximal hip and LE for functional self-care, mobility, lifting and ambulation/stair navigation   [] (63962)Reviewed/Progressed HEP activities related to improving balance, coordination, kinesthetic sense, posture, motor skill, proprioception of core, proximal hip and LE for self care, mobility, lifting, and ambulation/stair navigation      Manual Treatments:  PROM / STM / Oscillations-Mobs:  G-I, II, III, IV (PA's, Inf., Post.)  [x] (47234) Provided manual therapy to mobilize LE, proximal hip and/or LS spine soft tissue/joints for the purpose of modulating pain, promoting relaxation,  increasing ROM, reducing/eliminating soft tissue swelling/inflammation/restriction, improving soft tissue extensibility and allowing for proper ROM for normal function with self care, mobility, lifting and ambulation. Modalities:  Vaso x 10 minutes    Charges:  Timed Code Treatment Minutes: 45   Total Treatment Minutes: 55     [] EVAL (LOW) 97236 (typically 20 minutes face-to-face)  [] EVAL (MOD) 69036 (typically 30 minutes face-to-face)  [] EVAL (HIGH) 60538 (typically 45 minutes face-to-face)  [] RE-EVAL     [x] LS(72190) x  2   [] IONTO  [] NMR (53378) x      [x] VASO  [x] Manual (83234) x  1    [] Other:  [] TA x       [] Mech Traction (14003)  [] ES(attended) (17725)      [] ES (un) (00833):     GOALS:   Patient stated goal: Improve mobility of knee, return to work     Therapist goals for Patient:   Short Term Goals:  To be achieved in: 2 weeks  1. Independent in HEP and progression per patient tolerance, in order to prevent re-injury. MET  2. Patient will have a decrease in pain to facilitate improvement in movement, function, and ADLs as indicated by Functional Deficits. MET     Long Term Goals: To be achieved in: 12 weeks  1. Disability index score of 45% or less for the LEFS to assist with reaching prior level of function. 2. Patient will demonstrate increased AROM to 0-120 deg to allow for proper joint functioning as indicated by patients Functional Deficits. PROGRESSING 119  3. Patient will demonstrate an increase in Strength to good proximal hip strength and control, 4+/5 MMT LE to allow for proper functional mobility as indicated by patients Functional Deficits. PROGRESSING  4. Patient will return to ADLs and functional activities without increased symptoms or restriction. PROGRESSING  5. Patient will return to work in 4-6 hour shifts. 6. Patient will ambulate with symmetrical gait pattern, reciprocally ascending stairs. PROGRESSING; Step-to sometimes descending    New or Updated Goals (if applicable):  [x] No change to goals established upon initial eval/last progress note:  New Goals:    Progression Towards Functional goals:   [x] Patient is progressing as expected towards functional goals listed. [] Progression is slowed due to complexities listed. [] Progression has been slowed due to co-morbidities. [] Plan just implemented, too soon to assess goals progression  [] Other:     ASSESSMENT:    [x] Improvement noted relative to goals:  [] No Improvement noted related to goals:  Summary/Patient's response to treatment: Patient's ROM and strength progressing well. Patient pleased with progress, but is anxious to get back to work. 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  Patient tolerated initiation of

## 2018-07-30 ENCOUNTER — HOSPITAL ENCOUNTER (OUTPATIENT)
Dept: PHYSICAL THERAPY | Age: 78
Setting detail: THERAPIES SERIES
Discharge: HOME | End: 2018-07-30
Payer: MEDICARE

## 2018-07-30 ENCOUNTER — OFFICE VISIT (OUTPATIENT)
Dept: ORTHOPEDIC SURGERY | Age: 78
End: 2018-07-30

## 2018-07-30 VITALS
BODY MASS INDEX: 25.4 KG/M2 | HEART RATE: 66 BPM | DIASTOLIC BLOOD PRESSURE: 72 MMHG | WEIGHT: 138 LBS | HEIGHT: 62 IN | SYSTOLIC BLOOD PRESSURE: 129 MMHG

## 2018-07-30 DIAGNOSIS — Z96.651 HISTORY OF TOTAL RIGHT KNEE REPLACEMENT: Primary | ICD-10-CM

## 2018-07-30 PROCEDURE — 97140 MANUAL THERAPY 1/> REGIONS: CPT | Performed by: PHYSICAL THERAPIST

## 2018-07-30 PROCEDURE — 97110 THERAPEUTIC EXERCISES: CPT | Performed by: PHYSICAL THERAPIST

## 2018-07-30 PROCEDURE — 97016 VASOPNEUMATIC DEVICE THERAPY: CPT | Performed by: PHYSICAL THERAPIST

## 2018-07-30 PROCEDURE — 99024 POSTOP FOLLOW-UP VISIT: CPT | Performed by: ORTHOPAEDIC SURGERY

## 2018-07-30 NOTE — PROGRESS NOTES
Chief Complaint  Knee Pain (RIGHT)    right knee status post total knee arthroplasty. Date of surgery: 6/19/2018    History of Present Illness:  Sloane Rocha is a 77 y.o. female here for follow-up regarding their right knee status post total knee arthroplasty. Patient is doing well overall. Denies any pain and is ambulating without any assistive devices. Medical History  Patient's medications, allergies, past medical, surgical, social and family histories were reviewed and updated as appropriate. Review of Systems  Pertinent items are noted in HPI. Relevant review of systems reviewed and available in the patient's chart    Vital Signs  Vitals:    07/30/18 1431   BP: 129/72   Pulse: 66       Pain Assessment:            General Exam:   Constitutional: Patient is adequately groomed with no evidence of malnutrition  Mental Status: The patient is oriented to time, place and person. The patient's mood and affect are appropriate. Vascular: Examination reveals no swelling or calf tenderness. Peripheral pulses are palpable and 2+. Neurological: The patient has good coordination. There is no weakness or sensory deficit. Right Knee Examination  Inspection:  No gross deformities noted. Minimal swelling, no ecchymosis or erythema. Incision site is clean, dry, intact and well-healed. Palpation:  Nontender to palpation    Range of Motion:  She has full extension of the right knee and flexes to approximately 110° today. Strength: Improving strength noted    Special Tests:  Not tested today    Skin: There are no rashes, ulcerations or lesions. Gait: Walking with a near-normal gait without her cane. Reflex: normal    Additional Examinations:  Left Lower Extremity: Examination of the left lower extremity does not show any tenderness, deformity or injury. Range of motion is unremarkable. There is no gross instability. There are no rashes, ulcerations or lesions.   Strength and tone are

## 2018-07-30 NOTE — FLOWSHEET NOTE
[facility], [address] [place] [address]  Phone: [phone]  Fax [phone]    Physical Therapy Daily Treatment Note  Date:  2018    Patient Name:  Rico Valdes    :  1940  MRN: [id number]  Restrictions/Precautions:    Physician Information:  Referring Practitioner: Dr. Alba Saint  Medical/Treatment Diagnosis Information:  · Diagnosis: Right Knee OA (M17.11) s/p Right TKR 18  · Treatment Diagnosis: Right Knee Pain (M25.561) / Difficulty Walking (R26.2) / Right Knee Stiffness (M25.661)                    [] Conservative / [x] Surgical - DOS: 18  Therapy Diagnosis/Practice Pattern:  Practice Pattern H: Joint Arthroplasty  Insurance/Certification information:  PT Insurance Information: [employer] (check visits)  Plan of care signed: [] YES  [x] NO  Number of Comorbidities:  []0     [x]1-2    []3+  Date of Patient follow up with Physician:     G-Code (if applicable):      Date G-Code Applied:  18  PT G-Codes  Functional Assessment Tool Used: LEFS  Score: 74%  Functional Limitation: Mobility: Walking and moving around  Mobility: Walking and Moving Around Current Status (): At least 60 percent but less than 80 percent impaired, limited or restricted  Mobility: Walking and Moving Around Goal Status (): At least 40 percent but less than 60 percent impaired, limited or restricted    Progress Note: [x]  Yes  []  No  Next due by: Visit #10        Latex Allergy:  [x]NO      []YES  Preferred Language for Healthcare:   [x]English       []other:    Visit # Insurance Allowable Reporting Period   7 12 visits Orthonet   (BMN) Begin Date: 2018               End Date:      RECERT DUE BY: 12 weeks    SUBJECTIVE: Patient reports Dr. Alba Saint cleared her to go back to work on Wednesday. HEP has been going well. Pleased with progress.      OBJECTIVE:   Observation: patient ambulating into clinic without AD  Palpation:      Test used Initial score Current Score   Pain Summary VAS 5/10 0-5/10   Functional questionnaire LEFS 74%    ROM flexion 101 deg 123    extension 0 deg after stretching 0 upon arrival   Strength Quad Poor + Fair    ABD      flexion          RESTRICTIONS/PRECAUTIONS: Osteopenia    Exercises/Interventions: MH x 5' before bike    Therapeutic Ex Sets/reps Notes   Long sitting HS stretch 4 x 20\" HEP   gastroc belt stretch 4 x 20\" HEP   Quad set 10 x 10\" HEP   Seated heel slide with belt/HS ball 15 x 5\" HEP   LAQ 2 x 15   HEP   EOB knee AA flexion 15 x 5\" HEP   SLR X 15 HEP   bike 8' Able to make revolutions both directions; low bike 15   clamshells X 15 Bilat; HEP   Piriformis stretch     bridge X 15    HR/ TR X 20HEP   Mini Squats X20HEP   LBW 1 lap HEP; orange   FSU  trialed 6\", but difficult   Standing ABD X 10 HEP   Manual Intervention     Patella mobs, PROM, gs/hs stretch X 8'    STM hamstrings, back of knee  SL   SL quad stretch  Held 7/16   Extension mobs               NMR re-education     stairs 6' B hand rail, 1 hand rail   Feet together airex, tandem airex 2 x 20\" each                                           Therapeutic Exercise and NMR EXR  [x] (32163) Provided verbal/tactile cueing for activities related to strengthening, flexibility, endurance, ROM for improvements in LE, proximal hip, and core control with self care, mobility, lifting, ambulation.  [] (20992) Provided verbal/tactile cueing for activities related to improving balance, coordination, kinesthetic sense, posture, motor skill, proprioception  to assist with LE, proximal hip, and core control in self care, mobility, lifting, ambulation and eccentric single leg control.      NMR and Therapeutic Activities:    [x] (34787 or 68235) Provided verbal/tactile cueing for activities related to improving balance, coordination, kinesthetic sense, posture, motor skill, proprioception and motor activation to allow for proper function of core, proximal hip and LE with self care and ADLs  [] (21612) Gait Re-education- Provided training and instruction to the patient for proper LE, core and proximal hip recruitment and positioning and eccentric body weight control with ambulation re-education including up and down stairs     Home Exercise Program:    [x] (69499) Reviewed/Progressed HEP activities related to strengthening, flexibility, endurance, ROM of core, proximal hip and LE for functional self-care, mobility, lifting and ambulation/stair navigation   [] (05340)Reviewed/Progressed HEP activities related to improving balance, coordination, kinesthetic sense, posture, motor skill, proprioception of core, proximal hip and LE for self care, mobility, lifting, and ambulation/stair navigation      Manual Treatments:  PROM / STM / Oscillations-Mobs:  G-I, II, III, IV (PA's, Inf., Post.)  [x] (18478) Provided manual therapy to mobilize LE, proximal hip and/or LS spine soft tissue/joints for the purpose of modulating pain, promoting relaxation,  increasing ROM, reducing/eliminating soft tissue swelling/inflammation/restriction, improving soft tissue extensibility and allowing for proper ROM for normal function with self care, mobility, lifting and ambulation. Modalities:  Vaso x 10 minutes    Charges:  Timed Code Treatment Minutes: 45   Total Treatment Minutes: 55     [] EVAL (LOW) 79837 (typically 20 minutes face-to-face)  [] EVAL (MOD) 17253 (typically 30 minutes face-to-face)  [] EVAL (HIGH) 52715 (typically 45 minutes face-to-face)  [] RE-EVAL     [x] RW(97665) x  2   [] IONTO  [] NMR (43713) x      [x] VASO  [x] Manual (80433) x  1    [] Other:  [] TA x       [] Mech Traction (24853)  [] ES(attended) (65853)      [] ES (un) (30196):     GOALS:   Patient stated goal: Improve mobility of knee, return to work     Therapist goals for Patient:   Short Term Goals: To be achieved in: 2 weeks  1.  Independent in HEP and progression per patient tolerance, in order to prevent re-injury. MET  2. Patient will have a decrease in pain to facilitate improvement in movement, function, and ADLs as indicated by Functional Deficits. MET     Long Term Goals: To be achieved in: 12 weeks  1. Disability index score of 45% or less for the LEFS to assist with reaching prior level of function. 2. Patient will demonstrate increased AROM to 0-120 deg to allow for proper joint functioning as indicated by patients Functional Deficits.   3. Patient will demonstrate an increase in Strength to good proximal hip strength and control, 4+/5 MMT LE to allow for proper functional mobility as indicated by patients Functional Deficits. PROGRESSING  4. Patient will return to ADLs and functional activities without increased symptoms or restriction. PROGRESSING  5. Patient will return to work in 4-6 hour shifts. 6. Patient will ambulate with symmetrical gait pattern, reciprocally ascending stairs. PROGRESSING; Step-to sometimes descending    New or Updated Goals (if applicable):  [x] No change to goals established upon initial eval/last progress note:  New Goals:    Progression Towards Functional goals:   [x] Patient is progressing as expected towards functional goals listed. [] Progression is slowed due to complexities listed. [] Progression has been slowed due to co-morbidities. [] Plan just implemented, too soon to assess goals progression  [] Other:     ASSESSMENT:    [x] Improvement noted relative to goals:  [] No Improvement noted related to goals:  Summary/Patient's response to treatment: Patient's ROM and strength progressing well. Patient ambulating with steady gait without AD, slightly wide ROSAURA. Patient tolerated initiation of balance exercises well. Will continue functional strengthening and balance exercises. Patient to return to work this week and returns to therapy next week.     Treatment/Activity Tolerance:  [x] Patient tolerated treatment well [] Patient limited by

## 2018-08-06 ENCOUNTER — HOSPITAL ENCOUNTER (OUTPATIENT)
Dept: PHYSICAL THERAPY | Age: 78
Setting detail: THERAPIES SERIES
Discharge: HOME | End: 2018-08-06
Payer: MEDICARE

## 2018-08-06 PROCEDURE — 97016 VASOPNEUMATIC DEVICE THERAPY: CPT | Performed by: PHYSICAL THERAPIST

## 2018-08-06 PROCEDURE — 97140 MANUAL THERAPY 1/> REGIONS: CPT | Performed by: PHYSICAL THERAPIST

## 2018-08-06 PROCEDURE — 97110 THERAPEUTIC EXERCISES: CPT | Performed by: PHYSICAL THERAPIST

## 2018-08-06 NOTE — FLOWSHEET NOTE
[facility], [address]  Phone: [phone]  Fax [phone]    Physical Therapy Daily Treatment Note  Date:  2018    Patient Name:  Davon Kim    :  1940  MRN: [id number]  Restrictions/Precautions:    Physician Information:  Referring Practitioner: Dr. Brenda Torres  Medical/Treatment Diagnosis Information:  · Diagnosis: Right Knee OA (M17.11) s/p Right TKR 18  · Treatment Diagnosis: Right Knee Pain (M25.561) / Difficulty Walking (R26.2) / Right Knee Stiffness (M25.661)                    [] Conservative / [x] Surgical - DOS: 18  Therapy Diagnosis/Practice Pattern:  Practice Pattern H: Joint Arthroplasty  Insurance/Certification information:  PT [facility] Information: [facility] (check [facility])  Plan of care signed: [] YES  [x] NO  Number of Comorbidities:  []0     [x]1-2    []3+  Date of Patient follow up with Physician:     G-Code (if applicable):      Date G-Code Applied:  18  PT G-Codes  Functional Assessment Tool Used: LEFS  Score: 74%  Functional Limitation: Mobility: Walking and moving around  Mobility: Walking and Moving Around Current Status (): At least 60 percent but less than 80 percent impaired, limited or restricted  Mobility: Walking and Moving Around Goal Status (): At least 40 percent but less than 60 percent impaired, limited or restricted    Progress Note: [x]  Yes  []  No  Next due by: Visit #10        Latex Allergy:  [x]NO      []YES  Preferred Language for Healthcare:   [x]English       []other:    Visit # Insurance Allowable Reporting Period   8 12 visits Orthonet   (BMN) Begin Date: 2018               End Date:      RECERT DUE BY: 12 weeks    SUBJECTIVE: Patient reports did well working 2 days in row last week. Nervous about 3 days in row this week. Gets stiff after being in and out of the car running errands.   Feels she is twisting the knee when cueing for activities related to improving balance, coordination, kinesthetic sense, posture, motor skill, proprioception and motor activation to allow for proper function of core, proximal hip and LE with self care and ADLs  [] (21763) Gait Re-education- Provided training and instruction to the patient for proper LE, core and proximal hip recruitment and positioning and eccentric body weight control with ambulation re-education including up and down stairs     Home Exercise Program:    [x] (48671) Reviewed/Progressed HEP activities related to strengthening, flexibility, endurance, ROM of core, proximal hip and LE for functional self-care, mobility, lifting and ambulation/stair navigation   [] (07121)Reviewed/Progressed HEP activities related to improving balance, coordination, kinesthetic sense, posture, motor skill, proprioception of core, proximal hip and LE for self care, mobility, lifting, and ambulation/stair navigation      Manual Treatments:  PROM / STM / Oscillations-Mobs:  G-I, II, III, IV (PA's, Inf., Post.)  [x] (75890) Provided manual therapy to mobilize LE, proximal hip and/or LS spine soft tissue/joints for the purpose of modulating pain, promoting relaxation,  increasing ROM, reducing/eliminating soft tissue swelling/inflammation/restriction, improving soft tissue extensibility and allowing for proper ROM for normal function with self care, mobility, lifting and ambulation.      Modalities:  Vaso x 10 minutes    Charges:  Timed Code Treatment Minutes: 45   Total Treatment Minutes: 55     [] EVAL (LOW) 03513 (typically 20 minutes face-to-face)  [] EVAL (MOD) 10812 (typically 30 minutes face-to-face)  [] EVAL (HIGH) 21711 (typically 45 minutes face-to-face)  [] RE-EVAL     [x] YK(74126) x  2   [] IONTO  [] NMR (64581) x      [x] VASO  [x] Manual (27269) x  1    [] Other:  [] TA x       [] Mech Traction (61189)  [] ES(attended) (67972)      [] ES (un) (96397):     GOALS:   Patient stated goal: Improve mobility of knee, return to work     Therapist goals for Patient:   Short Term Goals: To be achieved in: 2 weeks  1. Independent in HEP and progression per patient tolerance, in order to prevent re-injury. MET  2. Patient will have a decrease in pain to facilitate improvement in movement, function, and ADLs as indicated by Functional Deficits. MET     Long Term Goals: To be achieved in: 12 weeks  1. Disability index score of 45% or less for the LEFS to assist with reaching prior level of function. 2. Patient will demonstrate increased AROM to 0-120 deg to allow for proper joint functioning as indicated by patients Functional Deficits.   3. Patient will demonstrate an increase in Strength to good proximal hip strength and control, 4+/5 MMT LE to allow for proper functional mobility as indicated by patients Functional Deficits. PROGRESSING  4. Patient will return to ADLs and functional activities without increased symptoms or restriction. PROGRESSING  5. Patient will return to work in 4-6 hour shifts. 6. Patient will ambulate with symmetrical gait pattern, reciprocally ascending stairs. PROGRESSING; Step-to sometimes descending    New or Updated Goals (if applicable):  [x] No change to goals established upon initial eval/last progress note:  New Goals:    Progression Towards Functional goals:   [x] Patient is progressing as expected towards functional goals listed. [] Progression is slowed due to complexities listed. [] Progression has been slowed due to co-morbidities. [] Plan just implemented, too soon to assess goals progression  [] Other:     ASSESSMENT:    [x] Improvement noted relative to goals:  [] No Improvement noted related to goals:  Summary/Patient's response to treatment: Worked on descending stairs today with reciprocal gait pattern. Patient did well on 4\" step and 6\" stairs in the back. Discussed practicing the last 2-3 steps at home. Ambulating with minimal deviation.

## 2018-08-10 ENCOUNTER — HOSPITAL ENCOUNTER (OUTPATIENT)
Dept: PHYSICAL THERAPY | Age: 78
Setting detail: THERAPIES SERIES
Discharge: HOME | End: 2018-08-10
Payer: MEDICARE

## 2018-08-10 PROCEDURE — 97016 VASOPNEUMATIC DEVICE THERAPY: CPT | Performed by: PHYSICAL THERAPIST

## 2018-08-10 PROCEDURE — 97140 MANUAL THERAPY 1/> REGIONS: CPT | Performed by: PHYSICAL THERAPIST

## 2018-08-10 PROCEDURE — 97110 THERAPEUTIC EXERCISES: CPT | Performed by: PHYSICAL THERAPIST

## 2018-08-10 NOTE — FLOWSHEET NOTE
[facility], [address] [place]  Phone: [phone]  Fax [phone]    Physical Therapy Daily Treatment Note  Date:  8/10/2018    Patient Name:  Alphonse Sutton    :  1940  MRN: [id number]  Restrictions/Precautions:    Physician Information:  Referring Practitioner: Dr. Georgi Burkett  Medical/Treatment Diagnosis Information:  · Diagnosis: Right Knee OA (M17.11) s/p Right TKR 18  · Treatment Diagnosis: Right Knee Pain (M25.561) / Difficulty Walking (R26.2) / Right Knee Stiffness (M25.661)                    [] Conservative / [x] Surgical - DOS: 18  Therapy Diagnosis/Practice Pattern:  Practice Pattern H: Joint Arthroplasty  Insurance/Certification information:  PT Insurance Information: [employer] (check visits)  Plan of care signed: [] YES  [x] NO  Number of Comorbidities:  []0     [x]1-2    []3+  Date of Patient follow up with Physician:     G-Code (if applicable):      Date G-Code Applied:  18  PT G-Codes  Functional Assessment Tool Used: LEFS  Score: 74%  Functional Limitation: Mobility: Walking and moving around  Mobility: Walking and Moving Around Current Status (): At least 60 percent but less than 80 percent impaired, limited or restricted  Mobility: Walking and Moving Around Goal Status (): At least 40 percent but less than 60 percent impaired, limited or restricted    Progress Note: [x]  Yes  []  No  Next due by: Visit #10        Latex Allergy:  [x]NO      []YES  Preferred Language for Healthcare:   [x]English       []other:    Visit # Insurance Allowable Reporting Period   9 12 visits Orthonet   (BMN) Begin Date: 2018               End Date:      RECERT DUE BY: 12 weeks    SUBJECTIVE: Pt states that she was unable to do her HEP yesterday because she has swelling in her feet. Pt states that she \"feels\" her knee move. Says it is not a click.       Pt had less c/o clicking after prone quad s and piriformis s. OBJECTIVE:   Observation: patient ambulating into clinic without AD  Palpation:      Test used Initial score Current Score   Pain Summary VAS 5/10 0-5/10   Functional questionnaire LEFS 74%    ROM flexion 101 deg 123    extension 0 deg after stretching 0 upon arrival   Strength Quad Poor + Fair    ABD      flexion          RESTRICTIONS/PRECAUTIONS: Osteopenia    Exercises/Interventions:     Therapeutic Ex Sets/reps Notes   MHP 8 min. Bike 8 min Able to make revolutions both directions; low bike 15      Long sitting HS stretch 4 x 20\" HEP   gastroc belt stretch 4 x 20\" HEP   Quad set 15 x 10\" HEP   Seated heel slide with belt/HS ball 15 x 5\" HEP    LAQ 2# 2 x 10 HEP   Supine piriformis s 3x  ;20 bilat    SLR X 20 HEP   SL hip abd 2 x 10     Prone TKE X 15  :05    clamshells X 20 Bilat; HEP   Piriformis stretch     bridge X 15    Supine hip flex/ quad stretch 4 x 20\"    Standing HS curl X 15    HR/ TR X 20HEP   Mini Squats X 20HEP   LBW 1 lap HEP; orange   FSU  FSU/ Over 6\" x 10  4\" x 10  trialed 6\", but difficult   Standing ABD X 15 bilat HEP   Manual Intervention     Patella mobs, PROM, gs/hs stretch X 8'    STM hamstrings, back of knee  SL   Extension mobs X 1'    ITB stretch 2 min. Prone quad s 4x  :20     NMR re-education     stairs 6' B hand rail, 1 hand rail   Feet together airex, tandem airex                                            Therapeutic Exercise and NMR EXR  [x] (38985) Provided verbal/tactile cueing for activities related to strengthening, flexibility, endurance, ROM for improvements in LE, proximal hip, and core control with self care, mobility, lifting, ambulation.  [] (76331) Provided verbal/tactile cueing for activities related to improving balance, coordination, kinesthetic sense, posture, motor skill, proprioception  to assist with LE, proximal hip, and core control in self care, mobility, lifting, ambulation and eccentric single leg control.      NMR last 2-3 steps at home. Ambulating with minimal deviation.      Treatment/Activity Tolerance:  [x] Patient tolerated treatment well [] Patient limited by fatique  [] Patient limited by pain  [] Patient limited by other medical complications  [] Other:     Prognosis: [x] Good [] Fair  [] Poor    Patient Requires Follow-up: [x] Yes  [] No    PLAN:   [x] Continue per plan of car e [] Alter current plan (see comments)  [] Plan of care initiated [] Hold pending MD visit [] Discharge    Electronically signed by: Roxy Saini PT

## 2018-08-13 ENCOUNTER — HOSPITAL ENCOUNTER (OUTPATIENT)
Dept: PHYSICAL THERAPY | Age: 78
Setting detail: THERAPIES SERIES
Discharge: HOME | End: 2018-08-13
Payer: MEDICARE

## 2018-08-13 PROCEDURE — 97110 THERAPEUTIC EXERCISES: CPT | Performed by: PHYSICAL THERAPIST

## 2018-08-13 PROCEDURE — G8979 MOBILITY GOAL STATUS: HCPCS | Performed by: PHYSICAL THERAPIST

## 2018-08-13 PROCEDURE — 97140 MANUAL THERAPY 1/> REGIONS: CPT | Performed by: PHYSICAL THERAPIST

## 2018-08-13 PROCEDURE — G8978 MOBILITY CURRENT STATUS: HCPCS | Performed by: PHYSICAL THERAPIST

## 2018-08-13 PROCEDURE — 97016 VASOPNEUMATIC DEVICE THERAPY: CPT | Performed by: PHYSICAL THERAPIST

## 2018-08-13 NOTE — FLOWSHEET NOTE
cueing for activities related to improving balance, coordination, kinesthetic sense, posture, motor skill, proprioception and motor activation to allow for proper function of core, proximal hip and LE with self care and ADLs  [] (83794) Gait Re-education- Provided training and instruction to the patient for proper LE, core and proximal hip recruitment and positioning and eccentric body weight control with ambulation re-education including up and down stairs     Home Exercise Program:    [x] (44809) Reviewed/Progressed HEP activities related to strengthening, flexibility, endurance, ROM of core, proximal hip and LE for functional self-care, mobility, lifting and ambulation/stair navigation   [] (28244)Reviewed/Progressed HEP activities related to improving balance, coordination, kinesthetic sense, posture, motor skill, proprioception of core, proximal hip and LE for self care, mobility, lifting, and ambulation/stair navigation      Manual Treatments:  PROM / STM / Oscillations-Mobs:  G-I, II, III, IV (PA's, Inf., Post.)  [x] (20117) Provided manual therapy to mobilize LE, proximal hip and/or LS spine soft tissue/joints for the purpose of modulating pain, promoting relaxation,  increasing ROM, reducing/eliminating soft tissue swelling/inflammation/restriction, improving soft tissue extensibility and allowing for proper ROM for normal function with self care, mobility, lifting and ambulation.      Modalities:  Vaso x 10 minutes    Charges:  Timed Code Treatment Minutes: 45   Total Treatment Minutes: 55     [] EVAL (LOW) 87918 (typically 20 minutes face-to-face)  [] EVAL (MOD) 22198 (typically 30 minutes face-to-face)  [] EVAL (HIGH) 87185 (typically 45 minutes face-to-face)  [] RE-EVAL     [x] VZ(65763) x  2   [] IONTO  [] NMR (48202) x      [x] VASO  [x] Manual (42708) x  1    [] Other:  [] TA x       [] Mech Traction (22368)  [] ES(attended) (46209)      [] ES (un) (19300):     GOALS:   Patient stated goal: Improve other medical complications  [] Other:     Prognosis: [x] Good [] Fair  [] Poor    Patient Requires Follow-up: [x] Yes  [] No    PLAN:   [x] Continue per plan of car e [] Alter current plan (see comments)  [] Plan of care initiated [] Hold pending MD visit [] Discharge    Electronically signed by: Pricila Beltran PT

## 2018-08-13 NOTE — PLAN OF CARE
[facility], [address]  Phone: [phone]  Fax [phone]     Physical Therapy Re-Certification Plan of Care    Dear  Dr. Bárbara Jenkins,    We had the pleasure of treating the following patient for physical therapy services at [address] [place] [address]. A summary of our findings can be found in the updated assessment below. This includes our plan of care. If you have any questions or concerns regarding these findings, please do not hesitate to contact me at the office phone number checked above. Thank you for the referral.     Physician Signature:________________________________Date:__________________  By signing above, therapists plan is approved by physician      Patient: Arin Hodges   : 1940   MRN: [id number]  Referring Physician:  Dr. Bárbara Jenkins      Evaluation Date: 2018      Medical Diagnosis Information:  ·   Diagnosis: Right Knee OA (M17.11) s/p Right TKR 18  · Treatment Diagnosis: Right Knee Pain (M25.561) / Difficulty Walking (R26.2) / Right Knee Stiffness (M25.661)      Insurance information:  [place] [id number] authorized    Date Range:18 to 18  Total visits:10      G-Codes: (if applicable) PT G-Codes  Functional Assessment Tool Used: LEFS  Score: 49%  Functional Limitation: Mobility: Walking and moving around  Mobility: Walking and Moving Around Current Status (): At least 40 percent but less than 60 percent impaired, limited or restricted  Mobility: Walking and Moving Around Goal Status (): At least 40 percent but less than 60 percent impaired, limited or restricted   Functional Index used:    SUBJECTIVE: Stiffness after prolonged sitting and work shifts. Overall tightness of the knee. Feels walking well and minimal issues. Difficulty descending stairs.      Current Pain Scale: 4/10    Type: [x]Constant   []Intermitment

## 2018-08-17 ENCOUNTER — HOSPITAL ENCOUNTER (OUTPATIENT)
Dept: PHYSICAL THERAPY | Age: 78
Setting detail: THERAPIES SERIES
Discharge: HOME | End: 2018-08-17
Payer: MEDICARE

## 2018-08-17 ENCOUNTER — NURSE ONLY (OUTPATIENT)
Dept: FAMILY MEDICINE CLINIC | Age: 78
End: 2018-08-17

## 2018-08-17 DIAGNOSIS — D75.839 THROMBOCYTOSIS: Primary | ICD-10-CM

## 2018-08-17 DIAGNOSIS — D75.839 THROMBOCYTOSIS: ICD-10-CM

## 2018-08-17 LAB
BASOPHILS ABSOLUTE: 0.1 K/UL (ref 0–0.2)
BASOPHILS RELATIVE PERCENT: 1 %
EOSINOPHILS ABSOLUTE: 0.4 K/UL (ref 0–0.6)
EOSINOPHILS RELATIVE PERCENT: 7.4 %
HCT VFR BLD CALC: 33.1 % (ref 36–48)
HEMOGLOBIN: 10.5 G/DL (ref 12–16)
LYMPHOCYTES ABSOLUTE: 1.2 K/UL (ref 1–5.1)
LYMPHOCYTES RELATIVE PERCENT: 22.7 %
MCH RBC QN AUTO: 24.8 PG (ref 26–34)
MCHC RBC AUTO-ENTMCNC: 31.7 G/DL (ref 31–36)
MCV RBC AUTO: 78.3 FL (ref 80–100)
MONOCYTES ABSOLUTE: 0.6 K/UL (ref 0–1.3)
MONOCYTES RELATIVE PERCENT: 10.6 %
NEUTROPHILS ABSOLUTE: 3 K/UL (ref 1.7–7.7)
NEUTROPHILS RELATIVE PERCENT: 58.3 %
PDW BLD-RTO: 17.8 % (ref 12.4–15.4)
PLATELET # BLD: 297 K/UL (ref 135–450)
PMV BLD AUTO: 9.6 FL (ref 5–10.5)
RBC # BLD: 4.23 M/UL (ref 4–5.2)
WBC # BLD: 5.2 K/UL (ref 4–11)

## 2018-08-17 PROCEDURE — 97140 MANUAL THERAPY 1/> REGIONS: CPT | Performed by: PHYSICAL THERAPIST

## 2018-08-17 PROCEDURE — 36415 COLL VENOUS BLD VENIPUNCTURE: CPT | Performed by: FAMILY MEDICINE

## 2018-08-17 PROCEDURE — 97110 THERAPEUTIC EXERCISES: CPT | Performed by: PHYSICAL THERAPIST

## 2018-08-17 NOTE — FLOWSHEET NOTE
[facility],  [address]  Phone: [phone]  Fax [phone]    Physical Therapy Daily Treatment Note  Date:  2018    Patient Name:  Nila Castillo    :  1940  MRN: [id number]  Restrictions/Precautions:    Physician Information:  Referring Practitioner: Dr. Noni Gray  Medical/Treatment Diagnosis Information:  · Diagnosis: Right Knee OA (M17.11) s/p Right TKR 18  · Treatment Diagnosis: Right Knee Pain (M25.561) / Difficulty Walking (R26.2) / Right Knee Stiffness (M25.661)                    [] Conservative / [x] Surgical - DOS: 18  Therapy Diagnosis/Practice Pattern:  Practice Pattern H: Joint Arthroplasty  Insurance/Certification information:  [facility]  Plan of care signed: [] YES  [x] NO  Number of Comorbidities:  []0     [x]1-2    []3+  Date of Patient follow up with Physician:     G-Code (if applicable):      Date G-Code Applied:  18  PT G-Codes  Functional Assessment Tool Used: LEFS  Score: 74%  Functional Limitation: Mobility: Walking and moving around  Mobility: Walking and Moving Around Current Status (): At least 60 percent but less than 80 percent impaired, limited or restricted  Mobility: Walking and Moving Around Goal Status (): At least 40 percent but less than 60 percent impaired, limited or restricted    Progress Note: [x]  Yes  []  No  Next due by: Visit #10        Latex Allergy:  [x]NO      []YES  Preferred Language for Healthcare:   [x]English       []other:    Visit # Insurance Allowable Reporting Period   11 12 visits Orthonet   (BMN) Begin Date: 2018               End Date:      RECERT DUE BY: 12 weeks    SUBJECTIVE: Patient reports improved endurance and energy with work this week. Less issues working 3 days in row and less stiffness with prolonged sitting.       OBJECTIVE:   Observation: patient ambulating into or ) Provided verbal/tactile cueing for activities related to improving balance, coordination, kinesthetic sense, posture, motor skill, proprioception and motor activation to allow for proper function of core, proximal hip and LE with self care and ADLs  [] (43330) Gait Re-education- Provided training and instruction to the patient for proper LE, core and proximal hip recruitment and positioning and eccentric body weight control with ambulation re-education including up and down stairs     Home Exercise Program:    [x] (67592) Reviewed/Progressed HEP activities related to strengthening, flexibility, endurance, ROM of core, proximal hip and LE for functional self-care, mobility, lifting and ambulation/stair navigation   [] (11193)Reviewed/Progressed HEP activities related to improving balance, coordination, kinesthetic sense, posture, motor skill, proprioception of core, proximal hip and LE for self care, mobility, lifting, and ambulation/stair navigation      Manual Treatments:  PROM / STM / Oscillations-Mobs:  G-I, II, III, IV (PA's, Inf., Post.)  [x] (25175) Provided manual therapy to mobilize LE, proximal hip and/or LS spine soft tissue/joints for the purpose of modulating pain, promoting relaxation,  increasing ROM, reducing/eliminating soft tissue swelling/inflammation/restriction, improving soft tissue extensibility and allowing for proper ROM for normal function with self care, mobility, lifting and ambulation.      Modalities:   CP x 10 minutes    Charges:  Timed Code Treatment Minutes: 45   Total Treatment Minutes: 55     [] EVAL (LOW) 77296 (typically 20 minutes face-to-face)  [] EVAL (MOD) 58808 (typically 30 minutes face-to-face)  [] EVAL (HIGH) 72692 (typically 45 minutes face-to-face)  [] RE-EVAL     [x] JX(97124) x  2   [] IONTO  [] NMR (57088) x      [] VASO  [x] Manual (70597) x  1    [] Other:  [] TA x       [] Mech Traction (91574)  [] ES(attended) (06628)      [] ES (un) (21637):     GOALS: Patient stated goal: Improve mobility of knee, return to work     Therapist goals for Patient:   Short Term Goals: To be achieved in: 2 weeks  1. Independent in HEP and progression per patient tolerance, in order to prevent re-injury. MET  2. Patient will have a decrease in pain to facilitate improvement in movement, function, and ADLs as indicated by Functional Deficits. MET     Long Term Goals: To be achieved in: 12 weeks  1. Disability index score of 45% or less for the LEFS to assist with reaching prior level of function. - Progressing, 49%  2. Patient will demonstrate increased AROM to 0-120 deg to allow for proper joint functioning as indicated by patients Functional Deficits. MET 0-125 deg  3. Patient will demonstrate an increase in Strength to good proximal hip strength and control, 4+/5 MMT LE to allow for proper functional mobility as indicated by patients Functional Deficits. PROGRESSING  4. Patient will return to ADLs and functional activities without increased symptoms or restriction. PROGRESSING  5. Patient will return to work in 4-6 hour shifts. - MET  6. Patient will ambulate with symmetrical gait pattern, reciprocally ascending stairs. PROGRESSING; Difficulty descending stairs with reciprocal gait    New or Updated Goals (if applicable):  [x] No change to goals established upon initial eval/last progress note:  New Goals:    Progression Towards Functional goals:   [x] Patient is progressing as expected towards functional goals listed. [] Progression is slowed due to complexities listed. [] Progression has been slowed due to co-morbidities. [] Plan just implemented, too soon to assess goals progression  [] Other:     ASSESSMENT:    [x] Improvement noted relative to goals:  [] No Improvement noted related to goals:  Summary/Patient's response to treatment: Patient continues to have lateral clicking. Palpation appears it to be lateral quad and ITB. Updated HEP with quad stretch.   Initiation of leg press went well.      Treatment/Activity Tolerance:  [x] Patient tolerated treatment well [] Patient limited by fatique  [] Patient limited by pain  [] Patient limited by other medical complications  [] Other:     Prognosis: [x] Good [] Fair  [] Poor    Patient Requires Follow-up: [x] Yes  [] No    PLAN:   [x] Continue per plan of car e [] Alter current plan (see comments)  [] Plan of care initiated [] Hold pending MD visit [] Discharge    Electronically signed by: Glorine Bence, PT

## 2018-08-20 ENCOUNTER — TELEPHONE (OUTPATIENT)
Dept: FAMILY MEDICINE CLINIC | Age: 78
End: 2018-08-20

## 2018-08-24 ENCOUNTER — HOSPITAL ENCOUNTER (OUTPATIENT)
Dept: PHYSICAL THERAPY | Age: 78
Setting detail: THERAPIES SERIES
Discharge: HOME | End: 2018-08-24
Payer: MEDICARE

## 2018-08-24 PROCEDURE — 97140 MANUAL THERAPY 1/> REGIONS: CPT | Performed by: PHYSICAL THERAPIST

## 2018-08-24 PROCEDURE — 97110 THERAPEUTIC EXERCISES: CPT | Performed by: PHYSICAL THERAPIST

## 2018-08-24 NOTE — OP NOTE
FUNCTIONAL PROGRESS CHART     Member:  Junie Ramirez   Member ID#:   [address] [id number] [facility]   Referring Physician:Eliel [address] [place] [address] Referring Physician ID#: Therapy Office: [address] Date of Birth / Age: 1940 / 77 y.o. Treating Clinician: Magdalena Patino PT WYG-21 (s): M17.11   Discipline:      [x] PT    [] OT Diagnosis: s/p TKR   Involved Side:   [] Left    [x] Right    [] N/A Date of Injury:     Date of Surgery: 6/19/18       [] Clinical Update  [x] Additional Visits requested   Number of Additional Visits Requested: 10     Date: 8/24/2018 Date:   Total Number of Visits To Date 12    Pain Scale (0-10) 4    Gait     AROM     Knee flex 125    Knee ext 0              Strength     Quad tone Fair +    SLR               Proprioceptive / Neurological Deficits Pt is not using assistive device, but she has been unsteady on unlevel ground such as grass. SLS is poor. Functional Limitations / Additional Comments Step to gait when descending stairs of her bi-level. If she has a handrail, she can ascend with reciprocal gait. Pt is not sleeping well. Pt feels grinding in the knee. Pt needs UE support to get up from the commode.         Electronically Signed by Magdalena Patino PT

## 2018-08-24 NOTE — FLOWSHEET NOTE
sense, posture, motor skill, proprioception and motor activation to allow for proper function of core, proximal hip and LE with self care and ADLs  [] (84211) Gait Re-education- Provided training and instruction to the patient for proper LE, core and proximal hip recruitment and positioning and eccentric body weight control with ambulation re-education including up and down stairs     Home Exercise Program:    [x] (95517) Reviewed/Progressed HEP activities related to strengthening, flexibility, endurance, ROM of core, proximal hip and LE for functional self-care, mobility, lifting and ambulation/stair navigation   [] (60979)Reviewed/Progressed HEP activities related to improving balance, coordination, kinesthetic sense, posture, motor skill, proprioception of core, proximal hip and LE for self care, mobility, lifting, and ambulation/stair navigation      Manual Treatments:  PROM / STM / Oscillations-Mobs:  G-I, II, III, IV (PA's, Inf., Post.)  [x] (04652) Provided manual therapy to mobilize LE, proximal hip and/or LS spine soft tissue/joints for the purpose of modulating pain, promoting relaxation,  increasing ROM, reducing/eliminating soft tissue swelling/inflammation/restriction, improving soft tissue extensibility and allowing for proper ROM for normal function with self care, mobility, lifting and ambulation.      Modalities:   CP x 10 minutes    Charges:  Timed Code Treatment Minutes: 45   Total Treatment Minutes: 55     [] EVAL (LOW) 05487 (typically 20 minutes face-to-face)  [] EVAL (MOD) 14946 (typically 30 minutes face-to-face)  [] EVAL (HIGH) 73568 (typically 45 minutes face-to-face)  [] RE-EVAL     [x] PX(85053) x  2   [] IONTO  [] NMR (32999) x      [] VASO  [x] Manual (85952) x  1    [] Other:  [] TA x       [] Mech Traction (65629)  [] ES(attended) (06539)      [] ES (un) (43773):     GOALS:   Patient stated goal: Improve mobility of knee, return to work     Therapist goals for Patient:   Short Term

## 2018-08-27 DIAGNOSIS — Z12.11 ENCOUNTER FOR FIT (FECAL IMMUNOCHEMICAL TEST) SCREENING: Primary | ICD-10-CM

## 2018-08-27 LAB
CONTROL: POSITIVE
HEMOCCULT STL QL: NEGATIVE

## 2018-08-27 PROCEDURE — 82274 ASSAY TEST FOR BLOOD FECAL: CPT | Performed by: FAMILY MEDICINE

## 2018-08-31 ENCOUNTER — HOSPITAL ENCOUNTER (OUTPATIENT)
Dept: PHYSICAL THERAPY | Age: 78
Setting detail: THERAPIES SERIES
End: 2018-08-31
Payer: MEDICARE

## 2018-09-10 ENCOUNTER — OFFICE VISIT (OUTPATIENT)
Dept: ORTHOPEDIC SURGERY | Age: 78
End: 2018-09-10

## 2018-09-10 VITALS — WEIGHT: 138 LBS | HEIGHT: 62 IN | BODY MASS INDEX: 25.4 KG/M2

## 2018-09-10 DIAGNOSIS — Z96.651 HISTORY OF TOTAL RIGHT KNEE REPLACEMENT: Primary | ICD-10-CM

## 2018-09-10 PROCEDURE — 99024 POSTOP FOLLOW-UP VISIT: CPT | Performed by: ORTHOPAEDIC SURGERY

## 2018-09-21 ENCOUNTER — TELEPHONE (OUTPATIENT)
Dept: DERMATOLOGY | Age: 78
End: 2018-09-21

## 2018-10-04 NOTE — TELEPHONE ENCOUNTER
I will be on the look out for a Monday cancellation in [facility] with Dr. Navjot Salgado.

## 2018-10-15 ENCOUNTER — OFFICE VISIT (OUTPATIENT)
Dept: FAMILY MEDICINE CLINIC | Age: 78
End: 2018-10-15
Payer: MEDICARE

## 2018-10-15 VITALS
TEMPERATURE: 97.6 F | OXYGEN SATURATION: 97 % | DIASTOLIC BLOOD PRESSURE: 85 MMHG | BODY MASS INDEX: 25.24 KG/M2 | SYSTOLIC BLOOD PRESSURE: 120 MMHG | WEIGHT: 138 LBS

## 2018-10-15 DIAGNOSIS — R05.9 COUGH: ICD-10-CM

## 2018-10-15 DIAGNOSIS — J21.9 ACUTE BRONCHIOLITIS DUE TO UNSPECIFIED ORGANISM: Primary | ICD-10-CM

## 2018-10-15 PROCEDURE — 99214 OFFICE O/P EST MOD 30 MIN: CPT | Performed by: NURSE PRACTITIONER

## 2018-10-15 PROCEDURE — 87804 INFLUENZA ASSAY W/OPTIC: CPT | Performed by: NURSE PRACTITIONER

## 2018-10-15 RX ORDER — BROMPHENIRAMINE MALEATE, PSEUDOEPHEDRINE HYDROCHLORIDE, AND DEXTROMETHORPHAN HYDROBROMIDE 2; 30; 10 MG/5ML; MG/5ML; MG/5ML
5 SYRUP ORAL 4 TIMES DAILY PRN
Qty: 180 ML | Refills: 0 | Status: SHIPPED | OUTPATIENT
Start: 2018-10-15 | End: 2019-02-25 | Stop reason: ALTCHOICE

## 2018-10-15 RX ORDER — DOXYCYCLINE HYCLATE 100 MG
100 TABLET ORAL 2 TIMES DAILY
Qty: 20 TABLET | Refills: 0 | Status: SHIPPED | OUTPATIENT
Start: 2018-10-15 | End: 2018-10-25

## 2018-10-15 ASSESSMENT — ENCOUNTER SYMPTOMS
RHINORRHEA: 1
SHORTNESS OF BREATH: 0
EYE ITCHING: 1
GASTROINTESTINAL NEGATIVE: 1
SORE THROAT: 1
COUGH: 1

## 2018-10-22 ENCOUNTER — TELEPHONE (OUTPATIENT)
Dept: FAMILY MEDICINE CLINIC | Age: 78
End: 2018-10-22

## 2018-11-06 LAB
INFLUENZA A ANTIBODY: NEGATIVE
INFLUENZA B ANTIBODY: NEGATIVE

## 2018-11-30 RX ORDER — CLINDAMYCIN HYDROCHLORIDE 300 MG/1
300 CAPSULE ORAL DAILY
Qty: 2 CAPSULE | Refills: 3 | Status: SHIPPED | OUTPATIENT
Start: 2018-11-30 | End: 2018-12-01

## 2019-01-07 ENCOUNTER — OFFICE VISIT (OUTPATIENT)
Dept: ORTHOPEDIC SURGERY | Age: 79
End: 2019-01-07
Payer: MEDICARE

## 2019-01-07 VITALS — HEIGHT: 62 IN | BODY MASS INDEX: 25.4 KG/M2 | WEIGHT: 138 LBS

## 2019-01-07 DIAGNOSIS — M25.561 RIGHT KNEE PAIN, UNSPECIFIED CHRONICITY: Primary | ICD-10-CM

## 2019-01-07 DIAGNOSIS — M17.12 PRIMARY OSTEOARTHRITIS OF LEFT KNEE: ICD-10-CM

## 2019-01-07 PROCEDURE — 99213 OFFICE O/P EST LOW 20 MIN: CPT | Performed by: ORTHOPAEDIC SURGERY

## 2019-01-23 ENCOUNTER — TELEPHONE (OUTPATIENT)
Dept: ORTHOPEDIC SURGERY | Age: 79
End: 2019-01-23

## 2019-02-08 ENCOUNTER — NURSE ONLY (OUTPATIENT)
Dept: ORTHOPEDIC SURGERY | Age: 79
End: 2019-02-08
Payer: MEDICARE

## 2019-02-08 VITALS — HEIGHT: 62 IN | WEIGHT: 138 LBS | BODY MASS INDEX: 25.4 KG/M2

## 2019-02-08 DIAGNOSIS — M17.12 PRIMARY OSTEOARTHRITIS OF LEFT KNEE: Primary | ICD-10-CM

## 2019-02-08 PROCEDURE — 20610 DRAIN/INJ JOINT/BURSA W/O US: CPT | Performed by: PHYSICIAN ASSISTANT

## 2019-02-22 ENCOUNTER — NURSE ONLY (OUTPATIENT)
Dept: ORTHOPEDIC SURGERY | Age: 79
End: 2019-02-22
Payer: MEDICARE

## 2019-02-22 ENCOUNTER — TELEPHONE (OUTPATIENT)
Dept: FAMILY MEDICINE CLINIC | Age: 79
End: 2019-02-22

## 2019-02-22 VITALS — WEIGHT: 138 LBS | HEIGHT: 62 IN | BODY MASS INDEX: 25.4 KG/M2

## 2019-02-22 DIAGNOSIS — M17.12 PRIMARY OSTEOARTHRITIS OF LEFT KNEE: Primary | ICD-10-CM

## 2019-02-22 PROCEDURE — 20610 DRAIN/INJ JOINT/BURSA W/O US: CPT | Performed by: PHYSICIAN ASSISTANT

## 2019-02-22 NOTE — TELEPHONE ENCOUNTER
Patient called and states that she picked up her prescription for the Advair at her pharmacy and she noticed that Dr. Heidi Rehman did not give her any additional refills on it. Patient states that she has to call each month for the refill. Patient wants to know if Dr. Heidi Rehman can send additional refills to [facility] in [facility] for the Advair Diskus.

## 2019-02-22 NOTE — TELEPHONE ENCOUNTER
Spoke to the pharmacy and they could not \"add\" to the rx so they took a new rx to have on file with 1 refill.

## 2019-02-25 ENCOUNTER — OFFICE VISIT (OUTPATIENT)
Dept: DERMATOLOGY | Age: 79
End: 2019-02-25
Payer: MEDICARE

## 2019-02-25 DIAGNOSIS — Z12.83 SCREENING EXAM FOR SKIN CANCER: ICD-10-CM

## 2019-02-25 DIAGNOSIS — L57.0 ACTINIC KERATOSES: ICD-10-CM

## 2019-02-25 DIAGNOSIS — D48.5 NEOPLASM OF UNCERTAIN BEHAVIOR OF SKIN: Primary | ICD-10-CM

## 2019-02-25 PROCEDURE — 17003 DESTRUCT PREMALG LES 2-14: CPT | Performed by: DERMATOLOGY

## 2019-02-25 PROCEDURE — 17000 DESTRUCT PREMALG LESION: CPT | Performed by: DERMATOLOGY

## 2019-02-25 PROCEDURE — 11102 TANGNTL BX SKIN SINGLE LES: CPT | Performed by: DERMATOLOGY

## 2019-02-25 PROCEDURE — 99213 OFFICE O/P EST LOW 20 MIN: CPT | Performed by: DERMATOLOGY

## 2019-02-28 ENCOUNTER — NURSE ONLY (OUTPATIENT)
Dept: ORTHOPEDIC SURGERY | Age: 79
End: 2019-02-28
Payer: MEDICARE

## 2019-02-28 VITALS — WEIGHT: 138 LBS | HEIGHT: 62 IN | BODY MASS INDEX: 25.4 KG/M2

## 2019-02-28 DIAGNOSIS — M17.12 PRIMARY OSTEOARTHRITIS OF LEFT KNEE: Primary | ICD-10-CM

## 2019-02-28 LAB — DERMATOLOGY PATHOLOGY REPORT: ABNORMAL

## 2019-02-28 PROCEDURE — 20610 DRAIN/INJ JOINT/BURSA W/O US: CPT | Performed by: PHYSICIAN ASSISTANT

## 2019-03-04 ENCOUNTER — TELEPHONE (OUTPATIENT)
Dept: DERMATOLOGY | Age: 79
End: 2019-03-04

## 2019-03-04 DIAGNOSIS — D04.30 BOWEN'S DISEASE OF FACE: Primary | ICD-10-CM

## 2019-03-04 RX ORDER — FLUOCINONIDE 0.5 MG/G
OINTMENT TOPICAL
Qty: 30 G | Refills: 0 | Status: SHIPPED | OUTPATIENT
Start: 2019-03-04 | End: 2019-04-03 | Stop reason: ALTCHOICE

## 2019-03-25 ENCOUNTER — PROCEDURE VISIT (OUTPATIENT)
Dept: SURGERY | Age: 79
End: 2019-03-25
Payer: MEDICARE

## 2019-03-25 VITALS
HEART RATE: 75 BPM | SYSTOLIC BLOOD PRESSURE: 125 MMHG | BODY MASS INDEX: 25.24 KG/M2 | WEIGHT: 138 LBS | TEMPERATURE: 97.5 F | OXYGEN SATURATION: 99 % | DIASTOLIC BLOOD PRESSURE: 80 MMHG

## 2019-03-25 DIAGNOSIS — D04.39 SQUAMOUS CELL CARCINOMA IN SITU OF SKIN OF NOSE: Primary | ICD-10-CM

## 2019-03-25 PROCEDURE — 17311 MOHS 1 STAGE H/N/HF/G: CPT | Performed by: DERMATOLOGY

## 2019-03-25 PROCEDURE — 12052 INTMD RPR FACE/MM 2.6-5.0 CM: CPT | Performed by: DERMATOLOGY

## 2019-03-26 ENCOUNTER — TELEPHONE (OUTPATIENT)
Dept: SURGERY | Age: 79
End: 2019-03-26

## 2019-04-01 ENCOUNTER — OFFICE VISIT (OUTPATIENT)
Dept: FAMILY MEDICINE CLINIC | Age: 79
End: 2019-04-01
Payer: MEDICARE

## 2019-04-01 VITALS
SYSTOLIC BLOOD PRESSURE: 126 MMHG | TEMPERATURE: 98.6 F | OXYGEN SATURATION: 98 % | BODY MASS INDEX: 26.67 KG/M2 | DIASTOLIC BLOOD PRESSURE: 74 MMHG | WEIGHT: 145.8 LBS | HEART RATE: 63 BPM

## 2019-04-01 DIAGNOSIS — R73.9 HYPERGLYCEMIA: ICD-10-CM

## 2019-04-01 DIAGNOSIS — Z01.818 PRE-OP EVALUATION: Primary | ICD-10-CM

## 2019-04-01 DIAGNOSIS — D50.9 MICROCYTIC ANEMIA: ICD-10-CM

## 2019-04-01 LAB
ANION GAP SERPL CALCULATED.3IONS-SCNC: 9 MMOL/L (ref 3–16)
ANISOCYTOSIS: ABNORMAL
BASOPHILS ABSOLUTE: 0.1 K/UL (ref 0–0.2)
BASOPHILS RELATIVE PERCENT: 1.3 %
BUN BLDV-MCNC: 15 MG/DL (ref 7–20)
CALCIUM SERPL-MCNC: 9.1 MG/DL (ref 8.3–10.6)
CHLORIDE BLD-SCNC: 105 MMOL/L (ref 99–110)
CO2: 28 MMOL/L (ref 21–32)
CREAT SERPL-MCNC: <0.5 MG/DL (ref 0.6–1.2)
EOSINOPHILS ABSOLUTE: 0.4 K/UL (ref 0–0.6)
EOSINOPHILS RELATIVE PERCENT: 7.8 %
GFR AFRICAN AMERICAN: >60
GFR NON-AFRICAN AMERICAN: >60
GLUCOSE BLD-MCNC: 82 MG/DL (ref 70–99)
HCT VFR BLD CALC: 33.5 % (ref 36–48)
HEMATOLOGY PATH CONSULT: YES
HEMOGLOBIN: 10.4 G/DL (ref 12–16)
HYPOCHROMIA: ABNORMAL
LYMPHOCYTES ABSOLUTE: 1.3 K/UL (ref 1–5.1)
LYMPHOCYTES RELATIVE PERCENT: 29.4 %
MCH RBC QN AUTO: 21.6 PG (ref 26–34)
MCHC RBC AUTO-ENTMCNC: 30.9 G/DL (ref 31–36)
MCV RBC AUTO: 69.9 FL (ref 80–100)
MICROCYTES: ABNORMAL
MONOCYTES ABSOLUTE: 0.5 K/UL (ref 0–1.3)
MONOCYTES RELATIVE PERCENT: 12 %
NEUTROPHILS ABSOLUTE: 2.3 K/UL (ref 1.7–7.7)
NEUTROPHILS RELATIVE PERCENT: 49.5 %
PDW BLD-RTO: 21.2 % (ref 12.4–15.4)
PLATELET # BLD: 360 K/UL (ref 135–450)
PMV BLD AUTO: 9.1 FL (ref 5–10.5)
POTASSIUM SERPL-SCNC: 4.7 MMOL/L (ref 3.5–5.1)
RBC # BLD: 4.8 M/UL (ref 4–5.2)
SLIDE REVIEW: ABNORMAL
SODIUM BLD-SCNC: 142 MMOL/L (ref 136–145)
WBC # BLD: 4.6 K/UL (ref 4–11)

## 2019-04-01 PROCEDURE — 99214 OFFICE O/P EST MOD 30 MIN: CPT | Performed by: FAMILY MEDICINE

## 2019-04-01 PROCEDURE — 93000 ELECTROCARDIOGRAM COMPLETE: CPT | Performed by: FAMILY MEDICINE

## 2019-04-01 PROCEDURE — 36415 COLL VENOUS BLD VENIPUNCTURE: CPT | Performed by: FAMILY MEDICINE

## 2019-04-01 NOTE — PROGRESS NOTES
[facility]  [phone]  Fax: [phone]   Pre-operative History and Physical      DIAGNOSIS:  Bilateral cataracts. PROCEDURE:  Phacoemulsification with implant of right eye      History Obtained From:  patient    HISTORY OF PRESENT ILLNESS:    The patient is a [de-identified] y.o. female with significant past medical history listed below who presents for pre op evaluation       Past Medical History:   Diagnosis Date    Arthritis     Asthma     mild    Hematuria, microscopic 02/2018    Knee joint disorder 2018    left knee injection x3    Knee pain, right     PONV (postoperative nausea and vomiting)     Reflux     Seasonal allergies      Past Surgical History:   Procedure Laterality Date    COLONOSCOPY  2009    COLONOSCOPY  9/17/14    polyp    CYSTOSCOPY  02/2018    DILATION AND CURETTAGE OF UTERUS      FOOT SURGERY Bilateral [id number]    rt ft 3rd digit proximalinterphalangeal joint arthrodesis/lt  ft 5th/2nd proximal interphalangeal joint arthrodesis    JOINT REPLACEMENT Right 06/19/2018    right total knee replacement    KNEE ARTHROSCOPY      TONSILLECTOMY      UPPER GASTROINTESTINAL ENDOSCOPY  3/15/13     Current Outpatient Medications   Medication Sig Dispense Refill    fluocinonide (LIDEX) 0.05 % ointment Apply sparingly to affected area(s) bid prn for flares, up to 2 weeks at a time. Do not apply on cleared skin. 30 g 0    ADVAIR DISKUS 100-50 MCG/DOSE diskus inhaler INHALE ONE PUFF BY MOUTH EVERY 12 HOURS 60 each 0    Naproxen Sodium (ALEVE) 220 MG CAPS Take by mouth daily as needed for Pain      famotidine (PEPCID) 10 MG tablet Take 10 mg by mouth daily as needed      CALCIUM PO Take by mouth      clobetasol propionate 0.05 % GEL gel       Multiple Vitamins-Minerals (CENTRUM SILVER 50+WOMEN) TABS Take by mouth      albuterol (VENTOLIN HFA) 108 (90 BASE) MCG/ACT inhaler Inhale 2 puffs into the lungs every 4 hours as needed for Wheezing.  1 Inhaler 5    Cetirizine HCl (ALL DAY ALLERGY PO) Take  by mouth daily. No current facility-administered medications for this visit. Allergies:  Cephalexin and Erythromycin  History of allergic reaction to anesthesia:  No but patient states that she is sensitive to medications and has PONV     Social History     Tobacco Use   Smoking Status Never Smoker   Smokeless Tobacco Never Used     The patient states she drinks 1 per week. Family History   Problem Relation Age of Onset    Heart Disease Mother     Arthritis Mother     Cancer Father     Arthritis Father     Arthritis Sister     Rheum Arthritis Brother     Arthritis Brother     Asthma Brother     Asthma Other     Arthritis Brother     Arthritis Brother     Arthritis Brother     Arthritis Brother     Asthma Son        REVIEW OF SYSTEMS:    CONSTITUTIONAL:  negative  EYES:  negative  HEENT:  negative  RESPIRATORY:  negative  CARDIOVASCULAR:  negative  GASTROINTESTINAL:  negative  GENITOURINARY:  negative  INTEGUMENT/BREAST:  negative  HEMATOLOGIC/LYMPHATIC:  negative  ALLERGIC/IMMUNOLOGIC:  negative  ENDOCRINE:  negative  MUSCULOSKELETAL:  negative  NEUROLOGICAL:  negative    PHYSICAL EXAM:      /74 (Site: Left Upper Arm)   Pulse 63   Temp 98.6 °F (37 °C) (Oral)   Wt 145 lb 12.8 oz (66.1 kg)   SpO2 98%   BMI 26.67 kg/m²     CONSTITUTIONAL:  awake, alert, cooperative, no apparent distress, and appears stated age    Eyes:  Lids and lashes normal, pupils equal, round and reactive to light, extra ocular muscles intact, sclera clear, conjunctiva normal    Head/ENT:  Normocephalic, without obvious abnormality, atramatic, sinuses nontender on palpation, external ears without lesions, oral pharynx with moist mucus membranes, tonsils without erythema or exudates, gums normal and good dentition.     Neck:  Supple, symmetrical, trachea midline, no adenopathy, thyroid symmetric, not enlarged and no tenderness, skin normal    Heart:  Normal apical impulse, regular rate and rhythm, normal S1 and S2, no S3 or S4, and no murmur noted    Lungs:  No increased work of breathing, good air exchange, clear to auscultation bilaterally, no crackles or wheezing    Abdomen:  Normal bowel sounds, soft, non-distended, non-tender, no masses palpated, no hepatosplenomegally    Extremities:  No clubbing, cyanosis, or edema    NEUROLOGIC:  Awake, alert, oriented to name, place and time. Cranial nerves II-XII are grossly intact. Motor is 5 out of 5 bilaterally. DATA:  EKG:  Date: 4/1/19  I have reviewed EKG with the following interpretation:  Impression:  Sinus rhthym wnl      ASSESSMENT AND PLAN:    1. Patient is a [de-identified] y.o. female with above specified procedure planned on 4/5/19 with Dr. Lavinia Hess at [facility].  They will not require cardiology evaluation prior to procedure. 2. Stop asa/nsaids medications 7-10 days prior to procedure.   3. Patient cleared for surgery  Jesenia Hyde M.D    [address] [place] [address],  Alannah Jiménez 19  [phone]

## 2019-04-02 DIAGNOSIS — D50.9 MICROCYTIC ANEMIA: Primary | ICD-10-CM

## 2019-04-02 LAB
ESTIMATED AVERAGE GLUCOSE: 119.8 MG/DL
HBA1C MFR BLD: 5.8 %
HEMATOLOGY PATH CONSULT: NORMAL

## 2019-04-02 RX ORDER — FERROUS SULFATE 325(65) MG
325 TABLET ORAL
Qty: 90 TABLET | Refills: 1 | Status: SHIPPED | OUTPATIENT
Start: 2019-04-02 | End: 2019-12-13 | Stop reason: SDUPTHER

## 2019-04-03 NOTE — PROGRESS NOTES
PRE OP INSTRUCTION SHEET   1. Do not eat or drink anything after 12 midnight  prior to surgery. This includes no water, chewing gum or mints. 2. Take the following pills will a small sip of water (see MAR)                                        3. Aspirin, Ibuprofen, Advil, Naproxen, Vitamin E, fish oil and other Anti-inflammatory products should be stopped for 5 days before surgery or as directed by your physician. 4. Check with your Doctor regarding stopping Plavix, Coumadin, Lovenox, Fragmin or other blood thinners   5. Do not smoke, and do not drink any alcoholic beverages 24 hours prior to surgery. This includes NA Beer. 6. You may brush your teeth and gargle the morning of surgery. DO NOT SWALLOW WATER   7. You MUST make arrangements for a responsible adult to take you home after your surgery. You will not be allowed to leave alone or drive yourself home. It is strongly suggested someone stay with you the first 24 hrs. Your surgery will be cancelled if you do not have a ride home. 8. A parent/legal guardian must accompany a child scheduled for surgery and plan to stay at the hospital until the child is discharged. Please do not bring other children with you. 9. Please wear simple, loose fitting clothing to the hospital.  Garth Pruitt not bring valuables (money, credit cards, checkbooks, etc.) Do not wear any makeup (including no eye makeup) or nail polish on your fingers or toes. 10. DO NOT wear any jewelry or piercings on day of surgery. All body piercing jewelry must be removed. 11. If you have dentures,glasses, or contacts they will be removed before going to the OR; we will provide you a container. 12. Please see your family doctor/and cardiologist for a history & physical and/or concerning medications. Bring any test results/reports from your physician's office. Have history and labs faxed to [phone].  Remember to bring Blood Bank bracelet on the day of surgery. 14. If you have a Living Will and Durable Power of  for Healthcare, please bring in a copy. 13. Notify your Surgeon if you develop any illness between now and surgery  time, cough, cold, fever, sore throat, nausea, vomiting, etc.  Please notify your surgeon if you experience dizziness, shortness of breath or blurred vision between now & the time of your surgery   16. DO NOT shave your operative site 96 hours prior to surgery. For face & neck surgery, men may use an electric razor 48 hours prior to surgery. 17. Shower with _x__Antibacterial soap (x_chlorhexidine for total joint  Pt's) shower two times before surgery.(the morning of and the night before. 18. To provide excellent care visitors will be limited to one in the room at any given time.   Please call pre admission testing if you any further questions [phone] or [phone]

## 2019-04-03 NOTE — PROGRESS NOTES
Obstructive Sleep Apnea (MARYAM) Screening     Patient:  Mata Diamond    YOB: 1940      Medical Record #:  [id number]                     Date:  4/3/2019     1. Are you a loud and/or regular snorer? []  Yes       [x] No    2. Have you been observed to gasp or stop breathing during sleep? []  Yes       [x] No    3. Do you feel tired or groggy upon awakening or do you awaken with a headache?           []  Yes       [x] No    4. Are you often tired or fatigued during the wake time hours? []  Yes       [x] No    5. Do you fall asleep sitting, reading, watching TV or driving? []  Yes       [x] No    6. Do you often have problems with memory or concentration? []  Yes       [x] No    **If patient's score is ? 3 they are considered high risk for MARYAM. Notify the anesthesiologist of the high risk and document in focus note. Note:  If the patient's BMI is more than 35 kg m¯² , has neck circumference > 40 cm, and/or high blood pressure the risk is greater (© American Sleep Apnea Association, 2006).

## 2019-04-05 ENCOUNTER — ANESTHESIA (OUTPATIENT)
Dept: OPERATING ROOM | Age: 79
End: 2019-04-05
Payer: MEDICARE

## 2019-04-05 ENCOUNTER — HOSPITAL ENCOUNTER (OUTPATIENT)
Age: 79
Setting detail: OUTPATIENT SURGERY
Discharge: HOME | End: 2019-04-05
Attending: OPHTHALMOLOGY | Admitting: OPHTHALMOLOGY
Payer: MEDICARE

## 2019-04-05 ENCOUNTER — ANESTHESIA EVENT (OUTPATIENT)
Dept: OPERATING ROOM | Age: 79
End: 2019-04-05
Payer: MEDICARE

## 2019-04-05 VITALS
TEMPERATURE: 97.2 F | HEART RATE: 66 BPM | WEIGHT: 146 LBS | HEIGHT: 63 IN | BODY MASS INDEX: 25.87 KG/M2 | DIASTOLIC BLOOD PRESSURE: 75 MMHG | OXYGEN SATURATION: 100 % | SYSTOLIC BLOOD PRESSURE: 145 MMHG | RESPIRATION RATE: 16 BRPM

## 2019-04-05 VITALS — DIASTOLIC BLOOD PRESSURE: 56 MMHG | OXYGEN SATURATION: 100 % | SYSTOLIC BLOOD PRESSURE: 130 MMHG

## 2019-04-05 PROCEDURE — 7100000010 HC PHASE II RECOVERY - FIRST 15 MIN: Performed by: OPHTHALMOLOGY

## 2019-04-05 PROCEDURE — 6360000002 HC RX W HCPCS: Performed by: NURSE ANESTHETIST, CERTIFIED REGISTERED

## 2019-04-05 PROCEDURE — 3700000000 HC ANESTHESIA ATTENDED CARE: Performed by: OPHTHALMOLOGY

## 2019-04-05 PROCEDURE — 2500000003 HC RX 250 WO HCPCS: Performed by: ANESTHESIOLOGY

## 2019-04-05 PROCEDURE — 2500000003 HC RX 250 WO HCPCS: Performed by: OPHTHALMOLOGY

## 2019-04-05 PROCEDURE — 2709999900 HC NON-CHARGEABLE SUPPLY: Performed by: OPHTHALMOLOGY

## 2019-04-05 PROCEDURE — 3600000003 HC SURGERY LEVEL 3 BASE: Performed by: OPHTHALMOLOGY

## 2019-04-05 PROCEDURE — 3600000013 HC SURGERY LEVEL 3 ADDTL 15MIN: Performed by: OPHTHALMOLOGY

## 2019-04-05 PROCEDURE — 6360000002 HC RX W HCPCS: Performed by: OPHTHALMOLOGY

## 2019-04-05 PROCEDURE — 3700000001 HC ADD 15 MINUTES (ANESTHESIA): Performed by: OPHTHALMOLOGY

## 2019-04-05 PROCEDURE — 2580000003 HC RX 258: Performed by: ANESTHESIOLOGY

## 2019-04-05 PROCEDURE — 2580000003 HC RX 258: Performed by: NURSE ANESTHETIST, CERTIFIED REGISTERED

## 2019-04-05 PROCEDURE — 7100000011 HC PHASE II RECOVERY - ADDTL 15 MIN: Performed by: OPHTHALMOLOGY

## 2019-04-05 PROCEDURE — 6370000000 HC RX 637 (ALT 250 FOR IP): Performed by: OPHTHALMOLOGY

## 2019-04-05 PROCEDURE — V2632 POST CHMBR INTRAOCULAR LENS: HCPCS | Performed by: OPHTHALMOLOGY

## 2019-04-05 PROCEDURE — 2500000003 HC RX 250 WO HCPCS: Performed by: NURSE ANESTHETIST, CERTIFIED REGISTERED

## 2019-04-05 DEVICE — ACRYSOF(R) SINGLE-PIECE ACRYLIC FOLDABLE IOL,UV-ABSORBING POSTERIOR CHAMBER LENS (IOL/PC),13.0MM LENGTH, 6.0MM BICONVEX OPTIC, PLANARHAPTICS.
Type: IMPLANTABLE DEVICE | Site: EYE | Status: FUNCTIONAL
Brand: ACRYSOF®

## 2019-04-05 RX ORDER — LIDOCAINE HYDROCHLORIDE 10 MG/ML
2 INJECTION, SOLUTION INFILTRATION; PERINEURAL
Status: COMPLETED | OUTPATIENT
Start: 2019-04-05 | End: 2019-04-05

## 2019-04-05 RX ORDER — SODIUM CHLORIDE, SODIUM LACTATE, POTASSIUM CHLORIDE, CALCIUM CHLORIDE 600; 310; 30; 20 MG/100ML; MG/100ML; MG/100ML; MG/100ML
INJECTION, SOLUTION INTRAVENOUS CONTINUOUS
Status: DISCONTINUED | OUTPATIENT
Start: 2019-04-05 | End: 2019-04-05 | Stop reason: HOSPADM

## 2019-04-05 RX ORDER — TOBRAMYCIN AND DEXAMETHASONE 3; 1 MG/ML; MG/ML
SUSPENSION/ DROPS OPHTHALMIC PRN
Status: DISCONTINUED | OUTPATIENT
Start: 2019-04-05 | End: 2019-04-05 | Stop reason: ALTCHOICE

## 2019-04-05 RX ORDER — TOBRAMYCIN AND DEXAMETHASONE 3; 1 MG/ML; MG/ML
1 SUSPENSION/ DROPS OPHTHALMIC CONTINUOUS
Status: DISCONTINUED | OUTPATIENT
Start: 2019-04-05 | End: 2019-04-05 | Stop reason: HOSPADM

## 2019-04-05 RX ORDER — SODIUM CHLORIDE, SODIUM LACTATE, POTASSIUM CHLORIDE, CALCIUM CHLORIDE 600; 310; 30; 20 MG/100ML; MG/100ML; MG/100ML; MG/100ML
INJECTION, SOLUTION INTRAVENOUS CONTINUOUS PRN
Status: DISCONTINUED | OUTPATIENT
Start: 2019-04-05 | End: 2019-04-05 | Stop reason: SDUPTHER

## 2019-04-05 RX ORDER — PREDNISOLONE ACETATE 10 MG/ML
SUSPENSION/ DROPS OPHTHALMIC PRN
Status: DISCONTINUED | OUTPATIENT
Start: 2019-04-05 | End: 2019-04-05 | Stop reason: ALTCHOICE

## 2019-04-05 RX ORDER — TETRACAINE HYDROCHLORIDE 5 MG/ML
SOLUTION OPHTHALMIC PRN
Status: DISCONTINUED | OUTPATIENT
Start: 2019-04-05 | End: 2019-04-05 | Stop reason: ALTCHOICE

## 2019-04-05 RX ORDER — SODIUM CHLORIDE, POTASSIUM CHLORIDE, CALCIUM CHLORIDE, MAGNESIUM CHLORIDE, SODIUM ACETATE, AND SODIUM CITRATE 6.4; .75; .48; .3; 3.9; 1.7 MG/ML; MG/ML; MG/ML; MG/ML; MG/ML; MG/ML
SOLUTION IRRIGATION PRN
Status: DISCONTINUED | OUTPATIENT
Start: 2019-04-05 | End: 2019-04-05 | Stop reason: ALTCHOICE

## 2019-04-05 RX ORDER — PROPOFOL 10 MG/ML
INJECTION, EMULSION INTRAVENOUS PRN
Status: DISCONTINUED | OUTPATIENT
Start: 2019-04-05 | End: 2019-04-05 | Stop reason: SDUPTHER

## 2019-04-05 RX ORDER — PREDNISOLONE ACETATE 10 MG/ML
1 SUSPENSION/ DROPS OPHTHALMIC CONTINUOUS
Status: DISCONTINUED | OUTPATIENT
Start: 2019-04-05 | End: 2019-04-05 | Stop reason: HOSPADM

## 2019-04-05 RX ORDER — LIDOCAINE HYDROCHLORIDE 20 MG/ML
INJECTION, SOLUTION INFILTRATION; PERINEURAL PRN
Status: DISCONTINUED | OUTPATIENT
Start: 2019-04-05 | End: 2019-04-05 | Stop reason: SDUPTHER

## 2019-04-05 RX ORDER — PILOCARPINE HYDROCHLORIDE 20 MG/ML
SOLUTION/ DROPS OPHTHALMIC PRN
Status: DISCONTINUED | OUTPATIENT
Start: 2019-04-05 | End: 2019-04-05 | Stop reason: ALTCHOICE

## 2019-04-05 RX ADMIN — LIDOCAINE HYDROCHLORIDE 20 MG: 20 INJECTION, SOLUTION INFILTRATION; PERINEURAL at 07:55

## 2019-04-05 RX ADMIN — Medication 0.3 ML: at 06:51

## 2019-04-05 RX ADMIN — TOBRAMYCIN AND DEXAMETHASONE 1 DROP: 3; 1 SUSPENSION/ DROPS OPHTHALMIC at 08:40

## 2019-04-05 RX ADMIN — SODIUM CHLORIDE, POTASSIUM CHLORIDE, SODIUM LACTATE AND CALCIUM CHLORIDE: 600; 310; 30; 20 INJECTION, SOLUTION INTRAVENOUS at 07:55

## 2019-04-05 RX ADMIN — SODIUM CHLORIDE, POTASSIUM CHLORIDE, SODIUM LACTATE AND CALCIUM CHLORIDE: 600; 310; 30; 20 INJECTION, SOLUTION INTRAVENOUS at 06:59

## 2019-04-05 RX ADMIN — LIDOCAINE HYDROCHLORIDE 0.3 ML: 10 INJECTION, SOLUTION EPIDURAL; INFILTRATION; INTRACAUDAL; PERINEURAL at 06:59

## 2019-04-05 RX ADMIN — PROPOFOL 70 MG: 10 INJECTION, EMULSION INTRAVENOUS at 07:55

## 2019-04-05 RX ADMIN — Medication 0.3 ML: at 06:42

## 2019-04-05 RX ADMIN — Medication 0.3 ML: at 06:59

## 2019-04-05 RX ADMIN — BROMFENAC SODIUM 1 DROP: 0.7 SOLUTION/ DROPS OPHTHALMIC at 06:42

## 2019-04-05 ASSESSMENT — PULMONARY FUNCTION TESTS
PIF_VALUE: 0

## 2019-04-05 ASSESSMENT — PAIN SCALES - GENERAL: PAINLEVEL_OUTOF10: 0

## 2019-04-05 NOTE — ANESTHESIA PRE PROCEDURE
Department of Anesthesiology  Preprocedure Note       Name:  Juan Carlos Banuelos   Age:  [de-identified] y.o.  :  1940                                          MRN:  [id number]         Date:  2019      Surgeon: Sanjuana Hauser):  Nyasia Mike MD    Procedure: DR KAYLEY DEVLIN [facility] EMULSIFICATION OF CATARACT WITH  INTRAOCULAR LENS IMPLANT RIGHT EYE (Right Eye)    Medications prior to admission:   Prior to Admission medications    Medication Sig Start Date End Date Taking? Authorizing Provider   ferrous sulfate 325 (65 Fe) MG tablet Take 1 tablet by mouth daily (with breakfast) 19  Yes Pj Camargo MD   fluticasone-salmeterol (ADVAIR DISKUS) 100-50 MCG/DOSE diskus inhaler INHALE ONE PUFF BY MOUTH EVERY 12 HOURS 19  Yes Pj Camargo MD   Naproxen Sodium (ALEVE) 220 MG CAPS Take by mouth daily as needed for Pain   Yes Historical Provider, MD   famotidine (PEPCID) 10 MG tablet Take 10 mg by mouth daily as needed   Yes Historical Provider, MD   CALCIUM PO Take by mouth   Yes Historical Provider, MD   Multiple Vitamins-Minerals (CENTRUM SILVER 50+WOMEN) TABS Take by mouth   Yes Historical Provider, MD   albuterol (VENTOLIN HFA) 108 (90 BASE) MCG/ACT inhaler Inhale 2 puffs into the lungs every 4 hours as needed for Wheezing. 1/20/15  Yes Willam Rdz MD   Cetirizine HCl (ALL DAY ALLERGY PO) Take  by mouth daily.    Yes Historical Provider, MD       Current medications:    Current Facility-Administered Medications   Medication Dose Route Frequency Provider Last Rate Last Dose    prednisoLONE acetate (PRED FORTE) 1 % ophthalmic suspension 1 drop  1 drop Ophthalmic Continuous Nyasia Mike MD        tobramycin-dexamethasone [facility]) ophthalmic suspension 1 drop  1 drop Ophthalmic Continuous Nyasia Mike MD        bromfenac (PROLENSA) 0.07 % ophthalmic solution 1 drop  1 drop Right Eye Continuous Nyasia Mike MD        lidocaine PF 2 % in hylenex   Intraocular Once Nyasia Mike MD        lactated ringers infusion Intravenous Continuous José Miguel Larsen  mL/hr at 04/05/19 3128         Allergies:     Allergies   Allergen Reactions    Cephalexin      Vomiting    Oxycodone     Erythromycin Nausea And Vomiting       Problem List:    Patient Active Problem List   Diagnosis Code    AR (allergic rhinitis) J30.9    Asthma J45.909    Recurrent UTI N39.0    Hammer toe M20.40    Anemia D64.9    Hematuria R31.9    Left hamstring muscle strain S76.312A    Numbness and tingling in right hand R20.0, R20.2    Left knee pain M25.562    Hammer toe of right foot M20.41    Arthritis of right knee M17.11    Status post total left knee replacement Z96.652       Past Medical History:        Diagnosis Date    Arthritis     Asthma     mild    Hematuria, microscopic 02/2018    Knee joint disorder 2018    left knee injection x3    Knee pain, right     PONV (postoperative nausea and vomiting)     Reflux     Seasonal allergies        Past Surgical History:        Procedure Laterality Date    COLONOSCOPY  2009    COLONOSCOPY  9/17/14    polyp    CYSTOSCOPY  02/2018    DILATION AND CURETTAGE OF UTERUS      FOOT SURGERY Bilateral [id number]    rt ft 3rd digit proximalinterphalangeal joint arthrodesis/lt  ft 5th/2nd proximal interphalangeal joint arthrodesis    JOINT REPLACEMENT Right 06/19/2018    right total knee replacement    KNEE ARTHROSCOPY      TONSILLECTOMY      UPPER GASTROINTESTINAL ENDOSCOPY  3/15/13       Social History:    Social History     Tobacco Use    Smoking status: Never Smoker    Smokeless tobacco: Never Used   Substance Use Topics    Alcohol use: No     Alcohol/week: 0.0 oz                                Counseling given: Not Answered      Vital Signs (Current):   Vitals:    04/03/19 1137 04/05/19 0639   BP:  123/65   Pulse:  68   Resp:  14   Temp:  97.9 °F (36.6 °C)   TempSrc:  Temporal   SpO2:  97%   Weight: 146 lb (66.2 kg)    Height: 5' 2.5\" (1.588 m) BP Readings from Last 3 Encounters:   04/05/19 123/65   04/01/19 126/74   03/25/19 125/80       NPO Status: Time of last liquid consumption: 0530(sip with meds)                        Time of last solid consumption: 2030                        Date of last liquid consumption: 04/05/19                        Date of last solid food consumption: 04/04/19    BMI:   Wt Readings from Last 3 Encounters:   04/03/19 146 lb (66.2 kg)   04/01/19 145 lb 12.8 oz (66.1 kg)   03/25/19 138 lb (62.6 kg)     Body mass index is 26.28 kg/m². CBC:   Lab Results   Component Value Date    WBC 4.6 04/01/2019    RBC 4.80 04/01/2019    HGB 10.4 04/01/2019    HCT 33.5 04/01/2019    MCV 69.9 04/01/2019    RDW 21.2 04/01/2019     04/01/2019       CMP:   Lab Results   Component Value Date     04/01/2019    K 4.7 04/01/2019     04/01/2019    CO2 28 04/01/2019    BUN 15 04/01/2019    CREATININE <0.5 04/01/2019    GFRAA >60 04/01/2019    GFRAA >60 09/24/2011    AGRATIO 1.3 07/18/2018    LABGLOM >60 04/01/2019    GLUCOSE 82 04/01/2019    PROT 7.1 07/18/2018    PROT 6.1 09/24/2011    CALCIUM 9.1 04/01/2019    BILITOT <0.2 07/18/2018    ALKPHOS 97 07/18/2018    AST 17 07/18/2018    ALT 10 07/18/2018       POC Tests: No results for input(s): POCGLU, POCNA, POCK, POCCL, POCBUN, POCHEMO, POCHCT in the last 72 hours.     Coags: No results found for: PROTIME, INR, APTT    HCG (If Applicable): No results found for: PREGTESTUR, PREGSERUM, HCG, HCGQUANT     ABGs: No results found for: PHART, PO2ART, LIS7SQY, UGR3DKF, BEART, K5THRCRI     Type & Screen (If Applicable):  No results found for: LABABO, LABRH    Anesthesia Evaluation   history of anesthetic complications:   Airway: Mallampati: III  TM distance: >3 FB   Neck ROM: full  Mouth opening: > = 3 FB Dental:          Pulmonary:   (+) asthma:                            Cardiovascular:                      Neuro/Psych:   (+) neuromuscular disease:,             GI/Hepatic/Renal: Endo/Other:                     Abdominal:           Vascular:                                        Anesthesia Plan      general     ASA 2     (I discussed with the patient the risks and benefits of PIV, general anesthesia, IV Narcotics, PACU. All questions were answered the patient agrees with the plan.)  Induction: intravenous. Anesthetic plan and risks discussed with patient. Plan discussed with CRNA.                   Aruna Huang MD   4/5/2019

## 2019-04-05 NOTE — ANESTHESIA POSTPROCEDURE EVALUATION
Department of Anesthesiology  Postprocedure Note    Patient: Jorge Gomes  MRN: [id number]  YOB: 1940  Date of evaluation: 4/5/2019  Time:  9:33 AM     Procedure Summary     Date:  04/05/19 Room / Location:  [address]    Anesthesia Start:  0745 Anesthesia Stop:  0820    Procedure:  PHACO EMULSIFICATION OF CATARACT WITH  INTRAOCULAR LENS IMPLANT RIGHT EYE (Right Eye) Diagnosis:  (CATARACT RIGHT EYE)    Surgeon:  Sheryl Rae MD Responsible Provider:  Manny Warner MD    Anesthesia Type:  general ASA Status:  2          Anesthesia Type: general    Jameel Phase I: Jameel Score: 10    Jameel Phase II: Jameel Score: 10    Last vitals: Reviewed and per EMR flowsheets.        Anesthesia Post Evaluation

## 2019-04-05 NOTE — H&P
strength is 5 out of 5 all extremities bilaterally. Tone is normal.  NEUROLOGIC:  Awake, alert, oriented to name, place and time. Cranial nerves II-XII are grossly intact. Motor is 5 out of 5 bilaterally. Cerebellar finger to nose, heel to shin intact. Sensory is intact.   Babinski down going, Romberg negative, and gait is normal.      Impression: Visually Significant Cataract    Plan: Yandel 53

## 2019-04-16 NOTE — OP NOTE
OPERATIVE NOTE    Patient Name   Date of Birth Age  MRNFidencio Green   1940   [de-identified] y.o.  [id number]       Surgeon        Surgery Date  Juice Rascon        4/5/2019       Preoperative Diagnosis: Nuclear Sclerotic Cataract right eye    Postoperative Diagnosis: Nuclear Sclerotic Cataract right eye    Operative Procedure: Phacoemulsification/ Posterior Chamber Intraocular Lens Implantation          Anesthesia:   Peribulbar Block with MAC    Details of Procedure: The patient was brought to the operating room on the operative stretcher in the supine position with the head resting in the head rest.  After appropriate anesthesia monitoring devices were applied, IV sedation was carried out per the anesthesia service. Once sedation was achieved, a peribulbar block was performed, using a 5 mL combination solution containing 4 mL of 2% lidocaine with 1 mL of Vitrase. Approximately 2-3 mL of this solution was administered in a peribulbar fashion through the lower lid of the operative eye. Once appropriate akinesia and anesthesia were demonstrated, the operative eye was prepped and draped in the usual sterile fashion. Tobradex drops and Tetracain drops were administered. A wire lid speculum was then inserted into the operative eye. A paracentesis was then performed using a 15 degree supersharp blade to enter the globe at the limbus, and a .12 mm colibri forceps was used to stabilize the globe. Viscoat was then instilled into the anterior chamber. A temporal clear cornea groove was then created using the 15 degree supersharp blade. The cornea was then entered using the Grant 2.4 mm clearcut keratome blade. The capsulotomy was then performed using a cystotome, and the capsulorrhexis was completed using uttrata forceps. The nucleus of the cataract was then hydrodissected and hydrodelineated using sterile BSS on a 27 gauge cannula.   The nucleus of the cataract was then removed using the phacoemilsification/aspiration device. This was performed in a bimanual/CHOP technique. The remaining cortex was then removed using the irrigation/aspiration device. The posterior capsule was polished using the I/A device with CAP/VAC settings. The capsular bag was reformed using Provisc. The previously selected Grant Acrysof +21.5 diopter SA60AT intraocular lens implant was then inserted using the cartridge system. It was spun in place using a Kuglen hook. It was centered and found to be in good, stable position. The remaining viscoelastic was then removed using the I/A device. The corneal incision was then hydrated using sterile BSS on a 30 gauge cannula. It was checked and found to be water-tight. Pilocarpine 2%, followed by Tobradex ophthalmic solutions were then instilled into the operative eye. The wire lid speculum was removed, and a postoperative protective shield was applied. The patient was then transferred to the postanesthesia recovery unit in good stable condition. The patient tolerated the procedure well without complications. A postoperative instruction sheet was given, and the patient will follow up with Dr. Gadiel Gayle office as scheduled.       EBL: none    Specimen: none

## 2019-06-17 ENCOUNTER — OFFICE VISIT (OUTPATIENT)
Dept: FAMILY MEDICINE CLINIC | Age: 79
End: 2019-06-17
Payer: MEDICARE

## 2019-06-17 ENCOUNTER — OFFICE VISIT (OUTPATIENT)
Dept: ORTHOPEDIC SURGERY | Age: 79
End: 2019-06-17
Payer: MEDICARE

## 2019-06-17 VITALS
HEIGHT: 63 IN | DIASTOLIC BLOOD PRESSURE: 74 MMHG | WEIGHT: 145.94 LBS | BODY MASS INDEX: 25.86 KG/M2 | HEART RATE: 87 BPM | SYSTOLIC BLOOD PRESSURE: 120 MMHG

## 2019-06-17 VITALS
SYSTOLIC BLOOD PRESSURE: 118 MMHG | DIASTOLIC BLOOD PRESSURE: 68 MMHG | BODY MASS INDEX: 26.01 KG/M2 | OXYGEN SATURATION: 98 % | HEART RATE: 77 BPM | TEMPERATURE: 97.7 F | WEIGHT: 144.6 LBS

## 2019-06-17 DIAGNOSIS — M20.42 HAMMERTOE OF LEFT FOOT: Primary | ICD-10-CM

## 2019-06-17 DIAGNOSIS — D50.9 MICROCYTIC ANEMIA: ICD-10-CM

## 2019-06-17 DIAGNOSIS — E78.5 DYSLIPIDEMIA: ICD-10-CM

## 2019-06-17 DIAGNOSIS — J30.9 ALLERGIC RHINITIS, UNSPECIFIED SEASONALITY, UNSPECIFIED TRIGGER: Primary | ICD-10-CM

## 2019-06-17 LAB
ALBUMIN SERPL-MCNC: 4.2 G/DL (ref 3.4–5)
ALP BLD-CCNC: 64 U/L (ref 40–129)
ALT SERPL-CCNC: 12 U/L (ref 10–40)
AST SERPL-CCNC: 18 U/L (ref 15–37)
BASOPHILS ABSOLUTE: 0 K/UL (ref 0–0.2)
BASOPHILS RELATIVE PERCENT: 0.8 %
BILIRUB SERPL-MCNC: <0.2 MG/DL (ref 0–1)
BILIRUBIN DIRECT: <0.2 MG/DL (ref 0–0.3)
BILIRUBIN, INDIRECT: NORMAL MG/DL (ref 0–1)
CHOLESTEROL, TOTAL: 175 MG/DL (ref 0–199)
EOSINOPHILS ABSOLUTE: 0.3 K/UL (ref 0–0.6)
EOSINOPHILS RELATIVE PERCENT: 5.8 %
HCT VFR BLD CALC: 39.2 % (ref 36–48)
HDLC SERPL-MCNC: 66 MG/DL (ref 40–60)
HEMOGLOBIN: 12.7 G/DL (ref 12–16)
LDL CHOLESTEROL CALCULATED: 84 MG/DL
LYMPHOCYTES ABSOLUTE: 1.4 K/UL (ref 1–5.1)
LYMPHOCYTES RELATIVE PERCENT: 25.6 %
MCH RBC QN AUTO: 25.3 PG (ref 26–34)
MCHC RBC AUTO-ENTMCNC: 32.4 G/DL (ref 31–36)
MCV RBC AUTO: 78 FL (ref 80–100)
MONOCYTES ABSOLUTE: 0.6 K/UL (ref 0–1.3)
MONOCYTES RELATIVE PERCENT: 10.8 %
NEUTROPHILS ABSOLUTE: 3.1 K/UL (ref 1.7–7.7)
NEUTROPHILS RELATIVE PERCENT: 57 %
PDW BLD-RTO: 23 % (ref 12.4–15.4)
PLATELET # BLD: 248 K/UL (ref 135–450)
PMV BLD AUTO: 9.9 FL (ref 5–10.5)
RBC # BLD: 5.03 M/UL (ref 4–5.2)
TOTAL PROTEIN: 7.2 G/DL (ref 6.4–8.2)
TRIGL SERPL-MCNC: 125 MG/DL (ref 0–150)
VLDLC SERPL CALC-MCNC: 25 MG/DL
WBC # BLD: 5.5 K/UL (ref 4–11)

## 2019-06-17 PROCEDURE — 99213 OFFICE O/P EST LOW 20 MIN: CPT | Performed by: PODIATRIST

## 2019-06-17 PROCEDURE — 36415 COLL VENOUS BLD VENIPUNCTURE: CPT | Performed by: FAMILY MEDICINE

## 2019-06-17 PROCEDURE — 99214 OFFICE O/P EST MOD 30 MIN: CPT | Performed by: FAMILY MEDICINE

## 2019-06-17 RX ORDER — MONTELUKAST SODIUM 10 MG/1
10 TABLET ORAL NIGHTLY
Qty: 30 TABLET | Refills: 3 | Status: SHIPPED | OUTPATIENT
Start: 2019-06-17 | End: 2020-07-06 | Stop reason: SDUPTHER

## 2019-06-17 ASSESSMENT — PATIENT HEALTH QUESTIONNAIRE - PHQ9
2. FEELING DOWN, DEPRESSED OR HOPELESS: 0
SUM OF ALL RESPONSES TO PHQ9 QUESTIONS 1 & 2: 0
SUM OF ALL RESPONSES TO PHQ QUESTIONS 1-9: 0
SUM OF ALL RESPONSES TO PHQ QUESTIONS 1-9: 0
1. LITTLE INTEREST OR PLEASURE IN DOING THINGS: 0

## 2019-06-17 ASSESSMENT — ENCOUNTER SYMPTOMS: SHORTNESS OF BREATH: 0

## 2019-06-17 NOTE — LETTER
[place] [address]  Phone: [phone]  Fax: [phone]    Danni Piña MD        June 17, 2019     Patient: Salome Robles   YOB: 1940   Date of Visit: 6/17/2019       To Whom It May Concern: It is my medical opinion that Almita Gordon requires a disability parking placard for the following reasons:  She cannot walk 200 feet without stopping to rest.  Duration of need: 1 year    If you have any questions or concerns, please don't hesitate to call.     Sincerely,        Danni Piña MD

## 2019-06-17 NOTE — PATIENT INSTRUCTIONS
Patient Education        montelukast  Pronunciation:  bekah foote  Brand:  Singulair  What is the most important information I should know about montelukast?  Follow all directions on your medicine label and package. Tell each of your healthcare providers about all your medical conditions, allergies, and all medicines you use. What is montelukast?  Montelukast is a leukotriene (xlx-gsn-CYB-een) inhibitor. Leukotrienes are chemicals your body releases when you breathe in an allergen (such as pollen). These chemicals cause swelling in your lungs and tightening of the muscles around your airways, which can result in asthma symptoms. Montelukast is used to prevent asthma attacks in adults and children as young as 13 months old. Montelukast is also used to prevent exercise-induced bronchospasm in adults and children who are at least 10years old. Montelukast is also used to treat symptoms of year-round (perennial) allergies in adults and children who are at least 7 months old. It is also used to treat symptoms of seasonal allergies in adults and children who are at least 3years old. Do not give this medicine to a child without a doctor's advice. Montelukast is also used to prevent exercise-induced bronchoconstriction (narrowing of the air passages in the lungs) in adults and teenagers who are at least 13years old and are not already taking this medicine for other conditions. If you already take montelukast to prevent asthma or allergy symptoms, do not use an extra dose to treat exercise-induced bronchoconstriction. Montelukast may also be used for purposes not listed in this medication guide. What should I discuss with my healthcare provider before taking montelukast?  You should not use montelukast if you are allergic to it. To make sure montelukast is safe for you, tell your doctor if you have:  · asthma, or a history of severe allergic reaction to aspirin. The chewable tablet may contain phenylalanine. Talk to your doctor before using this form of montelukast if you have phenylketonuria (PKU). Montelukast is not expected to be harmful to an unborn baby. Tell your doctor if you are pregnant or plan to become pregnant during treatment. It is not known whether montelukast passes into breast milk or if it could harm a nursing baby. Tell your doctor if you are breast-feeding a baby. How should I take montelukast?  Follow all directions on your prescription label. Do not take this medicine in larger or smaller amounts or for longer than recommended. Montelukast is usually taken once daily in the evening for prevention of asthma or allergy symptoms. For exercise-induced bronchoconstriction, take a single dose at least 2 hours before you exercise, and do not take another dose for at least 24 hours. Follow your doctor's instructions. Montelukast is not a rescue medicine. It will not work fast enough to treat an asthma attack. Use only a fast acting inhalation medicine for an asthma attack. Tell your doctor if it seems like your asthma medications don't work as well. Swallow the regular tablet  whole, with a glass of water. The chewable tablet  must be chewed completely before you swallow it. The oral granules  can be placed directly into the mouth and swallowed, or mixed with a spoonful of applesauce, mashed carrots, rice, or ice cream. Oral granules can also be mixed with 1 teaspoon of baby formula or breast milk. Do not use any other type of liquid for mixing the granules. Other liquids can be taken before or after taking the medicine. After opening or mixing the oral granules, you must use them within 15 minutes. Do not save an open packet or mixed medicine for later use. It may take up to several weeks before your symptoms improve. Keep using the medication as directed and tell your doctor if your symptoms do not improve after several weeks of treatment.   If you also take a steroid asthma medicine, do not include:  · stomach pain, diarrhea;  · fever or other flu symptoms;  · cold symptoms such as stuffy nose, sinus pain, cough, sore throat;  · headache; or  · bed-wetting or loss of bladder control in children. This is not a complete list of side effects and others may occur. Call your doctor for medical advice about side effects. You may report side effects to FDA at 1-592-SBA-9331. What other drugs will affect montelukast?  Other drugs may interact with montelukast, including prescription and over-the-counter medicines, vitamins, and herbal products. Tell each of your health care providers about all medicines you use now and any medicine you start or stop using. Where can I get more information? Your pharmacist can provide more information about montelukast.  Remember, keep this and all other medicines out of the reach of children, never share your medicines with others, and use this medication only for the indication prescribed. Every effort has been made to ensure that the information provided by [address] [place] [address]  is accurate, up-to-date, and complete, but no guarantee is made to that effect. Drug information contained herein may be time sensitive. [employer] information has been compiled for use by healthcare practitioners and consumers in the [place] and therefore [employer] does not warrant that uses outside of the [place] are appropriate, unless specifically indicated otherwise. [facility] drug information does not endorse drugs, diagnose patients or recommend therapy. [facility] [employer] drug information is an informational resource designed to assist licensed healthcare practitioners in caring for their patients and/or to serve consumers viewing this service as a supplement to, and not a substitute for, the expertise, skill, knowledge and judgment of healthcare practitioners.  The absence of a warning for a given drug or drug combination in no way should be construed to indicate that the drug or drug

## 2019-06-17 NOTE — PROGRESS NOTES
This is a return visit for patient presents with a new problem with a painful callus on her left fifth toe. She has noticed this for the past couple of months. She's not done anything for it. It is painful with most close toed shoes. She has a well nucleated hyperkeratotic lesion at the medial aspect of the left fifth digit base. There is a superficial ulceration underneath the callus. No signs of infection are present. There is no ascending cellulitis. The toe itself is mildly contracted at the PIPJ. She has palpable pedal pulses bilateral.  Her sensation is grossly intact bilateral.    Fifth digit hammertoe, left foot    We discussed debriding the callus on a regular basis. I also recommended an interdigital pad. She will keep a Band-Aid with Neosporin on the superficial ulceration until that heals. She will let me know if this continues to bother her.

## 2019-06-17 NOTE — PROGRESS NOTES
Chief Complaint   Patient presents with    Allergies         HPI      [de-identified] y.o. female presents today with complaints of allergies. Patient states that she has been having eyes that has worsened over the last 1 month. Has associated runny nose and congestion. At the end of the day she gets hoarse. She has been taking generic cetirizine, Flonase and tried an allergy eyedrop which she states only burns. No improvement in her symptoms. History of iron deficiency anemia patient states she takes her iron 50% of the time. Patient states she had colonoscopy recently with Dr. Gonzalez Hawkins she will have records sent to us.   Patient Active Problem List   Diagnosis    AR (allergic rhinitis)    Asthma    Recurrent UTI    Hammer toe    Anemia    Hematuria    Left hamstring muscle strain    Numbness and tingling in right hand    Left knee pain    Hammer toe of right foot    Arthritis of right knee    Status post total left knee replacement     Past Medical History:   Diagnosis Date    Arthritis     Asthma     mild    Hematuria, microscopic 02/2018    Knee joint disorder 2018    left knee injection x3    Knee pain, right     PONV (postoperative nausea and vomiting)     Reflux     Seasonal allergies        Past Surgical History:   Procedure Laterality Date    CATARACT REMOVAL WITH IMPLANT Right 04/05/2019    COLONOSCOPY  2009    COLONOSCOPY  9/17/14    polyp    CYSTOSCOPY  02/2018    DILATION AND CURETTAGE OF UTERUS      FOOT SURGERY Bilateral [id number]    rt ft 3rd digit proximalinterphalangeal joint arthrodesis/lt  ft 5th/2nd proximal interphalangeal joint arthrodesis    INTRACAPSULAR CATARACT EXTRACTION Right 4/5/2019    PHACO EMULSIFICATION OF CATARACT WITH  INTRAOCULAR LENS IMPLANT RIGHT EYE performed by Mckayla Silva MD at [address] [place] [address] Right 06/19/2018    right total knee replacement    KNEE ARTHROSCOPY      TONSILLECTOMY      UPPER GASTROINTESTINAL ENDOSCOPY  3/15/13 Most Recent Immunizations   Administered Date(s) Administered    Influenza Virus Vaccine 10/23/2016    Influenza, High Dose (Fluzone 65 yrs and older) 09/10/2018    Pneumococcal 13-valent Conjugate (Vovrrcq99) 11/11/2015    Pneumococcal Polysaccharide ([id number]) 01/22/2018    Tdap (Boostrix, Adacel) 06/12/2014    Zoster Live (Zostavax) 11/04/2012        Current Outpatient Medications   Medication Sig Dispense Refill    montelukast (SINGULAIR) 10 MG tablet Take 1 tablet by mouth nightly 30 tablet 3    ferrous sulfate 325 (65 Fe) MG tablet Take 1 tablet by mouth daily (with breakfast) 90 tablet 1    fluticasone-salmeterol (ADVAIR DISKUS) 100-50 MCG/DOSE diskus inhaler INHALE ONE PUFF BY MOUTH EVERY 12 HOURS 60 each 3    Naproxen Sodium (ALEVE) 220 MG CAPS Take by mouth daily as needed for Pain      famotidine (PEPCID) 10 MG tablet Take 10 mg by mouth daily as needed      CALCIUM PO Take by mouth      Multiple Vitamins-Minerals (CENTRUM SILVER 50+WOMEN) TABS Take by mouth      albuterol (VENTOLIN HFA) 108 (90 BASE) MCG/ACT inhaler Inhale 2 puffs into the lungs every 4 hours as needed for Wheezing. 1 Inhaler 5    Cetirizine HCl (ALL DAY ALLERGY PO) Take  by mouth daily. No current facility-administered medications for this visit. Allergies   Allergen Reactions    Cephalexin      Vomiting    Oxycodone     Erythromycin Nausea And Vomiting       Social History     Socioeconomic History    Marital status:       Spouse name: None    Number of children: 3    Years of education: 25    Highest education level: None   Occupational History    Occupation: therapist     Comment: [employer]   Social Needs    Financial resource strain: None    Food insecurity:     Worry: None     Inability: None    Transportation needs:     Medical: None     Non-medical: None   Tobacco Use    Smoking status: Never Smoker    Smokeless tobacco: Never Used   Substance and Sexual Activity    Alcohol use: No     Alcohol/week: 0.0 oz    Drug use: No    Sexual activity: Never   Lifestyle    Physical activity:     Days per week: None     Minutes per session: None    Stress: None   Relationships    Social connections:     Talks on phone: None     Gets together: None     Attends Scientologist service: None     Active member of club or organization: None     Attends meetings of clubs or organizations: None     Relationship status: None    Intimate partner violence:     Fear of current or ex partner: None     Emotionally abused: None     Physically abused: None     Forced sexual activity: None   Other Topics Concern    None   Social History Narrative    None     Family History   Problem Relation Age of Onset    Heart Disease Mother     Arthritis Mother     Cancer Father     Arthritis Father     Arthritis Sister     Rheum Arthritis Brother     Arthritis Brother     Asthma Brother     Asthma Other     Arthritis Brother     Arthritis Brother     Arthritis Brother     Arthritis Brother     Asthma Son                               Review Of Systems    Review of Systems   Constitutional: Negative for chills and fever. Respiratory: Negative for shortness of breath. Cardiovascular: Negative for chest pain. PHYSICAL EXAMINATION:    /68   Pulse 77   Temp 97.7 °F (36.5 °C) (Oral)   Wt 144 lb 9.6 oz (65.6 kg)   SpO2 98%   BMI 26.01 kg/m²      Physical Exam   Constitutional: She is oriented to person, place, and time. She appears well-developed and well-nourished. No distress. HENT:   Head: Normocephalic and atraumatic. Right Ear: External ear normal.   Left Ear: External ear normal.   Eyes: Conjunctivae and EOM are normal.   Cardiovascular: Normal rate, regular rhythm and normal heart sounds. Pulmonary/Chest: Effort normal. No stridor. No respiratory distress. She has no wheezes. Neurological: She is alert and oriented to person, place, and time. Skin: Skin is warm and dry.

## 2019-06-25 RX ORDER — OMEPRAZOLE 20 MG/1
20 CAPSULE, DELAYED RELEASE ORAL DAILY
Status: ON HOLD | COMMUNITY
End: 2019-06-27 | Stop reason: ALTCHOICE

## 2019-06-26 ENCOUNTER — ANESTHESIA EVENT (OUTPATIENT)
Dept: OPERATING ROOM | Age: 79
End: 2019-06-26
Payer: MEDICARE

## 2019-06-27 ENCOUNTER — HOSPITAL ENCOUNTER (OUTPATIENT)
Age: 79
Setting detail: OUTPATIENT SURGERY
Discharge: HOME | End: 2019-06-27
Attending: OPHTHALMOLOGY | Admitting: OPHTHALMOLOGY
Payer: MEDICARE

## 2019-06-27 ENCOUNTER — ANESTHESIA (OUTPATIENT)
Dept: OPERATING ROOM | Age: 79
End: 2019-06-27
Payer: MEDICARE

## 2019-06-27 VITALS
SYSTOLIC BLOOD PRESSURE: 137 MMHG | BODY MASS INDEX: 25.52 KG/M2 | HEIGHT: 63 IN | OXYGEN SATURATION: 97 % | TEMPERATURE: 97.6 F | WEIGHT: 144 LBS | DIASTOLIC BLOOD PRESSURE: 61 MMHG | HEART RATE: 71 BPM | RESPIRATION RATE: 16 BRPM

## 2019-06-27 VITALS — SYSTOLIC BLOOD PRESSURE: 122 MMHG | DIASTOLIC BLOOD PRESSURE: 61 MMHG | OXYGEN SATURATION: 100 %

## 2019-06-27 PROCEDURE — V2632 POST CHMBR INTRAOCULAR LENS: HCPCS | Performed by: OPHTHALMOLOGY

## 2019-06-27 PROCEDURE — 2500000003 HC RX 250 WO HCPCS: Performed by: ANESTHESIOLOGY

## 2019-06-27 PROCEDURE — 6370000000 HC RX 637 (ALT 250 FOR IP): Performed by: OPHTHALMOLOGY

## 2019-06-27 PROCEDURE — 7100000010 HC PHASE II RECOVERY - FIRST 15 MIN: Performed by: OPHTHALMOLOGY

## 2019-06-27 PROCEDURE — 6360000002 HC RX W HCPCS: Performed by: ANESTHESIOLOGY

## 2019-06-27 PROCEDURE — 3600000013 HC SURGERY LEVEL 3 ADDTL 15MIN: Performed by: OPHTHALMOLOGY

## 2019-06-27 PROCEDURE — 2580000003 HC RX 258: Performed by: ANESTHESIOLOGY

## 2019-06-27 PROCEDURE — 3700000001 HC ADD 15 MINUTES (ANESTHESIA): Performed by: OPHTHALMOLOGY

## 2019-06-27 PROCEDURE — 3600000003 HC SURGERY LEVEL 3 BASE: Performed by: OPHTHALMOLOGY

## 2019-06-27 PROCEDURE — 3700000000 HC ANESTHESIA ATTENDED CARE: Performed by: OPHTHALMOLOGY

## 2019-06-27 PROCEDURE — 6360000002 HC RX W HCPCS: Performed by: OPHTHALMOLOGY

## 2019-06-27 PROCEDURE — 6360000002 HC RX W HCPCS: Performed by: NURSE ANESTHETIST, CERTIFIED REGISTERED

## 2019-06-27 PROCEDURE — 7100000011 HC PHASE II RECOVERY - ADDTL 15 MIN: Performed by: OPHTHALMOLOGY

## 2019-06-27 PROCEDURE — 2709999900 HC NON-CHARGEABLE SUPPLY: Performed by: OPHTHALMOLOGY

## 2019-06-27 PROCEDURE — 2500000003 HC RX 250 WO HCPCS: Performed by: OPHTHALMOLOGY

## 2019-06-27 DEVICE — ACRYSOF(R) SINGLE-PIECE ACRYLIC FOLDABLE IOL,UV-ABSORBING POSTERIOR CHAMBER LENS (IOL/PC),13.0MM LENGTH, 6.0MM BICONVEX OPTIC, PLANARHAPTICS.
Type: IMPLANTABLE DEVICE | Site: EYE | Status: FUNCTIONAL
Brand: ACRYSOF®

## 2019-06-27 RX ORDER — TOBRAMYCIN AND DEXAMETHASONE 3; 1 MG/ML; MG/ML
1 SUSPENSION/ DROPS OPHTHALMIC SEE ADMIN INSTRUCTIONS
Status: DISCONTINUED | OUTPATIENT
Start: 2019-06-27 | End: 2019-06-27 | Stop reason: HOSPADM

## 2019-06-27 RX ORDER — PROPOFOL 10 MG/ML
INJECTION, EMULSION INTRAVENOUS PRN
Status: DISCONTINUED | OUTPATIENT
Start: 2019-06-27 | End: 2019-06-27 | Stop reason: SDUPTHER

## 2019-06-27 RX ORDER — PREDNISOLONE ACETATE 10 MG/ML
1 SUSPENSION/ DROPS OPHTHALMIC SEE ADMIN INSTRUCTIONS
Status: DISCONTINUED | OUTPATIENT
Start: 2019-06-27 | End: 2019-06-27 | Stop reason: HOSPADM

## 2019-06-27 RX ORDER — LIDOCAINE HYDROCHLORIDE 10 MG/ML
0.3 INJECTION, SOLUTION EPIDURAL; INFILTRATION; INTRACAUDAL; PERINEURAL
Status: COMPLETED | OUTPATIENT
Start: 2019-06-27 | End: 2019-06-27

## 2019-06-27 RX ORDER — SODIUM CHLORIDE, SODIUM LACTATE, POTASSIUM CHLORIDE, CALCIUM CHLORIDE 600; 310; 30; 20 MG/100ML; MG/100ML; MG/100ML; MG/100ML
INJECTION, SOLUTION INTRAVENOUS CONTINUOUS
Status: DISCONTINUED | OUTPATIENT
Start: 2019-06-27 | End: 2019-06-27 | Stop reason: HOSPADM

## 2019-06-27 RX ORDER — ONDANSETRON 2 MG/ML
4 INJECTION INTRAMUSCULAR; INTRAVENOUS EVERY 6 HOURS PRN
Status: DISCONTINUED | OUTPATIENT
Start: 2019-06-27 | End: 2019-06-27 | Stop reason: HOSPADM

## 2019-06-27 RX ORDER — TOBRAMYCIN AND DEXAMETHASONE 3; 1 MG/ML; MG/ML
SUSPENSION/ DROPS OPHTHALMIC PRN
Status: DISCONTINUED | OUTPATIENT
Start: 2019-06-27 | End: 2019-06-27 | Stop reason: ALTCHOICE

## 2019-06-27 RX ORDER — SODIUM CHLORIDE 0.9 % (FLUSH) 0.9 %
10 SYRINGE (ML) INJECTION PRN
Status: DISCONTINUED | OUTPATIENT
Start: 2019-06-27 | End: 2019-06-27 | Stop reason: HOSPADM

## 2019-06-27 RX ORDER — SODIUM CHLORIDE 0.9 % (FLUSH) 0.9 %
10 SYRINGE (ML) INJECTION EVERY 12 HOURS SCHEDULED
Status: DISCONTINUED | OUTPATIENT
Start: 2019-06-27 | End: 2019-06-27 | Stop reason: HOSPADM

## 2019-06-27 RX ORDER — PILOCARPINE HYDROCHLORIDE 20 MG/ML
SOLUTION/ DROPS OPHTHALMIC PRN
Status: DISCONTINUED | OUTPATIENT
Start: 2019-06-27 | End: 2019-06-27 | Stop reason: ALTCHOICE

## 2019-06-27 RX ORDER — PREDNISOLONE ACETATE 10 MG/ML
SUSPENSION/ DROPS OPHTHALMIC PRN
Status: DISCONTINUED | OUTPATIENT
Start: 2019-06-27 | End: 2019-06-27 | Stop reason: ALTCHOICE

## 2019-06-27 RX ORDER — SODIUM CHLORIDE, POTASSIUM CHLORIDE, CALCIUM CHLORIDE, MAGNESIUM CHLORIDE, SODIUM ACETATE, AND SODIUM CITRATE 6.4; .75; .48; .3; 3.9; 1.7 MG/ML; MG/ML; MG/ML; MG/ML; MG/ML; MG/ML
SOLUTION IRRIGATION PRN
Status: DISCONTINUED | OUTPATIENT
Start: 2019-06-27 | End: 2019-06-27 | Stop reason: ALTCHOICE

## 2019-06-27 RX ORDER — TETRACAINE HYDROCHLORIDE 5 MG/ML
SOLUTION OPHTHALMIC PRN
Status: DISCONTINUED | OUTPATIENT
Start: 2019-06-27 | End: 2019-06-27 | Stop reason: ALTCHOICE

## 2019-06-27 RX ADMIN — Medication 0.3 ML: at 13:38

## 2019-06-27 RX ADMIN — BROMFENAC SODIUM 1 DROP: 0.7 SOLUTION/ DROPS OPHTHALMIC at 13:19

## 2019-06-27 RX ADMIN — SODIUM CHLORIDE, POTASSIUM CHLORIDE, SODIUM LACTATE AND CALCIUM CHLORIDE: 600; 310; 30; 20 INJECTION, SOLUTION INTRAVENOUS at 13:20

## 2019-06-27 RX ADMIN — Medication 0.3 ML: at 13:55

## 2019-06-27 RX ADMIN — Medication 0.3 ML: at 13:50

## 2019-06-27 RX ADMIN — PROPOFOL 60 MG: 10 INJECTION, EMULSION INTRAVENOUS at 14:07

## 2019-06-27 RX ADMIN — LIDOCAINE HYDROCHLORIDE 0.3 ML: 10 INJECTION, SOLUTION EPIDURAL; INFILTRATION; INTRACAUDAL; PERINEURAL at 13:19

## 2019-06-27 RX ADMIN — ONDANSETRON 4 MG: 2 INJECTION INTRAMUSCULAR; INTRAVENOUS at 13:34

## 2019-06-27 ASSESSMENT — PULMONARY FUNCTION TESTS
PIF_VALUE: 0

## 2019-06-27 ASSESSMENT — PAIN - FUNCTIONAL ASSESSMENT: PAIN_FUNCTIONAL_ASSESSMENT: 0-10

## 2019-06-27 ASSESSMENT — LIFESTYLE VARIABLES: SMOKING_STATUS: 0

## 2019-06-27 ASSESSMENT — PAIN SCALES - GENERAL: PAINLEVEL_OUTOF10: 0

## 2019-06-27 NOTE — PROGRESS NOTES
Pt is being discharged to home. Discharge instructions gone over and given to pt/son. Pt/son denies any questions or concerns at this time. IV removed. Pt is in stable condition. Belongings bagged up and sent with pt.

## 2019-06-27 NOTE — ANESTHESIA POSTPROCEDURE EVALUATION
Department of Anesthesiology  Postprocedure Note    Patient: Kala Godfrey  MRN: [id number]  YOB: 1940  Date of evaluation: 6/27/2019    Procedure Summary     Date:  06/27/19 Room / Location:  [place] [address] / [facility]    Anesthesia Start:  1401 Anesthesia Stop:  1430    Procedure:  PHACO EMULSIFICATION OF CATARACT WITH INTRAOCULAR LENS IMPLANT EYE (Left Eye) Diagnosis:  (CATARACT LEFT EYE)    Surgeon:  Antonina Smart MD Responsible Provider:  Danni Delgado MD    Anesthesia Type:  TIVA, MAC ASA Status:  2     Anesthesia Type: No value filed. Jameel Phase I: Jameel Score: 10    Jameel Phase II: Jameel Score: 10    Last vitals: Reviewed and per EMR flowsheets.      Anesthesia Post Evaluation   Anesthetic Problems: no   Cardiovascular System Stable: yes  Respiratory Function: Airway Patent yes  ETT no  Ventilator no  Level of consciousness: awake, alert and oriented  Post-op pain: adequate analgesia  Hydration Adequate: yes  Nausea/Vomiting:no  Other Issues:     Sidney Banks MD

## 2019-06-27 NOTE — ANESTHESIA PRE PROCEDURE
Department of Anesthesiology  Preprocedure Note       Name:  Irene Liang   Age:  [de-identified] y.o.  :  1940                                          MRN:  [id number]         Date:  2019      Surgeon:  Elva Del Real MD    Procedure: DR KAYLEY HUERTA QUITA [facility] EMULSIFICATION OF CATARACT WITH INTRAOCULAR LENS IMPLANT EYE (Left Eye)    Medications prior to admission:    omeprazole (PRILOSEC) 20 MG   Take 20 mg by mouth daily   montelukast (SINGULAIR) 10 MG  Take 1 tablet by mouth nightly   ferrous sulfate 325 (65 Fe) MG  Take 1 tablet by mouth daily (with breakfast)   fluticasone-salmeterol (ADVAIR DISKUS)  INHALE ONE PUFF BY MOUTH EVERY 12 HOURS   Naproxen Sodium (ALEVE) 220 MG  Take by mouth daily as needed for Pain   famotidine (PEPCID) 10 MG tablet Take 10 mg by mouth daily as needed   CALCIUM PO Take by mouth   Multiple Vitamins-Minerals  Take by mouth   albuterol (VENTOLIN HFA)  inhaler Inhale 2 puffs into the lungs every 4 hours as needed for Wheezing. Cetirizine HCl (ALL DAY ALLERGY PO) Take  by mouth daily.      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    (-) diabetes mellitus, hypothyroidism               Abdominal:           Vascular:     - DVT and PE. Anesthesia Plan      TIVA and MAC     ASA 2       Induction: intravenous. Anesthetic plan and risks discussed with patient. Plan discussed with CRNA.             Cherelle Reynoso MD

## 2019-06-27 NOTE — OP NOTE
OPERATIVE NOTE    Patient Name   Date of Birth Age  MRNFidencio Bacon   1940   [de-identified] y.o.  [id number]       Surgeon        Surgery Date  Colt Schuler        6/27/2019       Preoperative Diagnosis: Nuclear Sclerotic Cataract left eye    Postoperative Diagnosis: Nuclear Sclerotic Cataract left eye    Operative Procedure: Phacoemulsification/ Posterior Chamber Intraocular Lens Implantation          Anesthesia:   Peribulbar Block with MAC    Details of Procedure: The patient was brought to the operating room on the operative stretcher in the supine position with the head resting in the head rest.  After appropriate anesthesia monitoring devices were applied, IV sedation was carried out per the anesthesia service. Once sedation was achieved, a peribulbar block was performed, using a 5 mL combination solution containing 4 mL of 2% lidocaine with 1 mL of Vitrase. Approximately 2-3 mL of this solution was administered in a peribulbar fashion through the lower lid of the operative eye. Once appropriate akinesia and anesthesia were demonstrated, the operative eye was prepped and draped in the usual sterile fashion. Tobradex drops and Tetracain drops were administered. A wire lid speculum was then inserted into the operative eye. A paracentesis was then performed using a 15 degree supersharp blade to enter the globe at the limbus, and a .12 mm colibri forceps was used to stabilize the globe. Viscoat was then instilled into the anterior chamber. A temporal clear cornea groove was then created using the 15 degree supersharp blade. The cornea was then entered using the Grant 2.4 mm clearcut keratome blade. The capsulotomy was then performed using a cystotome, and the capsulorrhexis was completed using uttrata forceps. The nucleus of the cataract was then hydrodissected and hydrodelineated using sterile BSS on a 27 gauge cannula.   The nucleus of the cataract was then removed using the

## 2019-06-27 NOTE — H&P
Surgical Service History and Physical    CHIEF COMPLAINT:   Decreased Vision and Glare    Reason for Admission:  Cataract Surgery    History Obtained From:  Patient    HISTORY OF PRESENT ILLNESS:  Pt has noticed painless progressive loss of vision and is experiencing functional difficulty. Past Medical History:        Diagnosis Date    Arthritis     Asthma     mild    Hematuria, microscopic 02/2018    Knee joint disorder 2018    left knee injection x3    Knee pain, right     PONV (postoperative nausea and vomiting)     Reflux     Seasonal allergies        Past Surgical History:        Procedure Laterality Date    CATARACT REMOVAL WITH IMPLANT Right 04/05/2019    COLONOSCOPY  2009    COLONOSCOPY  9/17/14    polyp    CYSTOSCOPY  02/2018    DILATION AND CURETTAGE OF UTERUS      FOOT SURGERY Bilateral [id number]    rt ft 3rd digit proximalinterphalangeal joint arthrodesis/lt  ft 5th/2nd proximal interphalangeal joint arthrodesis    INTRACAPSULAR CATARACT EXTRACTION Right 4/5/2019    PHACO EMULSIFICATION OF CATARACT WITH  INTRAOCULAR LENS IMPLANT RIGHT EYE performed by Mckayla Silva MD at [address] Right 06/19/2018    right total knee replacement    KNEE ARTHROSCOPY      TONSILLECTOMY      UPPER GASTROINTESTINAL ENDOSCOPY  3/15/13       Allergies:  Cephalexin; Oxycodone; and Erythromycin    Prior to Admission medications    Medication Sig Start Date End Date Taking?  Authorizing Provider   omeprazole (PRILOSEC) 20 MG delayed release capsule Take 20 mg by mouth daily   Yes Historical Provider, MD   montelukast (SINGULAIR) 10 MG tablet Take 1 tablet by mouth nightly 6/17/19   Jairon Florian MD   ferrous sulfate 325 (65 Fe) MG tablet Take 1 tablet by mouth daily (with breakfast) 4/2/19   Jairon Florian MD   fluticasone-salmeterol (ADVAIR DISKUS) 100-50 MCG/DOSE diskus inhaler INHALE ONE PUFF BY MOUTH EVERY 12 HOURS 4/1/19   Jairon Flroian MD   Naproxen Sodium (Melvin Mahmood) 220 MG CAPS Take by mouth daily as needed for Pain    Historical Provider, MD   famotidine (PEPCID) 10 MG tablet Take 10 mg by mouth daily as needed    Historical Provider, MD   CALCIUM PO Take by mouth    Historical Provider, MD   Multiple Vitamins-Minerals (CENTRUM SILVER 50+WOMEN) TABS Take by mouth    Historical Provider, MD   albuterol (VENTOLIN HFA) 108 (90 BASE) MCG/ACT inhaler Inhale 2 puffs into the lungs every 4 hours as needed for Wheezing. 1/20/15   Valentín Ayala MD   Cetirizine HCl (ALL DAY ALLERGY PO) Take  by mouth daily. Historical Provider, MD         REVIEW OF SYSTEMS:    CONSTITUTIONAL:  negative  EYES: negative  HEENT:  negative  RESPIRATORY:  negative  CARDIOVASCULAR:  negative  MUSCULOSKELETAL:  negative  NEUROLOGICAL:  negative    PHYSICAL EXAM:    VITALS:  Ht 5' 3\" (1.6 m)   Wt 144 lb (65.3 kg)   BMI 25.51 kg/m²   TEMPERATURE:  Current -  ; Max - No data recorded  RESPIRATIONS RANGE: No data recorded  PULSE RANGE: No data recorded  BLOOD PRESSURE RANGE:  No data recorded.  ; No data recorded. CONSTITUTIONAL:  awake, alert, cooperative, no apparent distress, and appears stated age  EYES:  Lids and lashes normal, pupils equal, round and reactive to light, extra ocular muscles intact, sclera clear, conjunctiva normal  ENT:  Normocephalic, without obvious abnormality, atramatic, sinuses nontender on palpation, external ears without lesions, oral pharynx with moist mucus membranes, tonsils without erythema or exudates, gums normal and good dentition.   NECK:  Supple, symmetrical, trachea midline, no adenopathy, thyroid symmetric, not enlarged and no tenderness, skin normal  LUNGS:  No increased work of breathing, good air exchange, clear to auscultation bilaterally, no crackles or wheezing  CARDIOVASCULAR:  Normal apical impulse, regular rate and rhythm, normal S1 and S2, no S3 or S4, and no murmur noted  ABDOMEN:  No scars, normal bowel sounds, soft, non-distended, non-tender, no masses palpated, no hepatosplenomegally  MUSCULOSKELETAL:  There is no redness, warmth, or swelling of the joints. Full range of motion noted. Motor strength is 5 out of 5 all extremities bilaterally. Tone is normal.  NEUROLOGIC:  Awake, alert, oriented to name, place and time. Cranial nerves II-XII are grossly intact. Motor is 5 out of 5 bilaterally. Cerebellar finger to nose, heel to shin intact. Sensory is intact.   Babinski down going, Romberg negative, and gait is normal.      Impression: Visually Significant Cataract    Plan: Yandel 52

## 2019-08-12 ENCOUNTER — TELEPHONE (OUTPATIENT)
Dept: FAMILY MEDICINE CLINIC | Age: 79
End: 2019-08-12

## 2019-08-12 NOTE — TELEPHONE ENCOUNTER
Patient called and states that the Singular works to help her allergies but does not help with her itchy eyes. Patient wanted to know if she if could take Cetirizine with the Singular. Patient states that the over the counter eye drops do not help. Please advise.

## 2019-09-10 ENCOUNTER — NURSE TRIAGE (OUTPATIENT)
Dept: OTHER | Facility: CLINIC | Age: 79
End: 2019-09-10

## 2019-09-10 NOTE — TELEPHONE ENCOUNTER
Reason for Disposition   Ear congestion present > 48 hours    Protocols used: EAR - CONGESTION-ADULT-[place]    Caller states that she has a build up of wax in her ears and it is causing her to feel dizzy. Patient has had this before and states that symptoms are the same. Transferred caller to [facility] to schedule appointment to be seen in the office.

## 2019-09-13 ENCOUNTER — OFFICE VISIT (OUTPATIENT)
Dept: FAMILY MEDICINE CLINIC | Age: 79
End: 2019-09-13
Payer: MEDICARE

## 2019-09-13 VITALS
HEART RATE: 79 BPM | TEMPERATURE: 97.9 F | WEIGHT: 136.4 LBS | OXYGEN SATURATION: 96 % | BODY MASS INDEX: 24.16 KG/M2 | SYSTOLIC BLOOD PRESSURE: 132 MMHG | DIASTOLIC BLOOD PRESSURE: 80 MMHG

## 2019-09-13 DIAGNOSIS — J30.9 ALLERGIC RHINITIS, UNSPECIFIED SEASONALITY, UNSPECIFIED TRIGGER: ICD-10-CM

## 2019-09-13 DIAGNOSIS — R42 DIZZINESS: Primary | ICD-10-CM

## 2019-09-13 DIAGNOSIS — Z87.448 HISTORY OF HEMATURIA: ICD-10-CM

## 2019-09-13 DIAGNOSIS — Z23 NEED FOR VACCINATION: ICD-10-CM

## 2019-09-13 DIAGNOSIS — D50.9 MICROCYTIC ANEMIA: ICD-10-CM

## 2019-09-13 LAB
BASOPHILS ABSOLUTE: 0.1 K/UL (ref 0–0.2)
BASOPHILS RELATIVE PERCENT: 0.9 %
BILIRUBIN URINE: NEGATIVE
BLOOD, URINE: NEGATIVE
CLARITY: CLEAR
COLOR: YELLOW
EOSINOPHILS ABSOLUTE: 0.3 K/UL (ref 0–0.6)
EOSINOPHILS RELATIVE PERCENT: 4.2 %
EPITHELIAL CELLS, UA: 3 /HPF (ref 0–5)
GLUCOSE URINE: NEGATIVE MG/DL
HCT VFR BLD CALC: 40.2 % (ref 36–48)
HEMOGLOBIN: 13.1 G/DL (ref 12–16)
HYALINE CASTS: 5 /LPF (ref 0–8)
KETONES, URINE: NEGATIVE MG/DL
LEUKOCYTE ESTERASE, URINE: ABNORMAL
LYMPHOCYTES ABSOLUTE: 1.7 K/UL (ref 1–5.1)
LYMPHOCYTES RELATIVE PERCENT: 24 %
MCH RBC QN AUTO: 27.6 PG (ref 26–34)
MCHC RBC AUTO-ENTMCNC: 32.7 G/DL (ref 31–36)
MCV RBC AUTO: 84.4 FL (ref 80–100)
MICROSCOPIC EXAMINATION: YES
MONOCYTES ABSOLUTE: 0.8 K/UL (ref 0–1.3)
MONOCYTES RELATIVE PERCENT: 11.2 %
NEUTROPHILS ABSOLUTE: 4.3 K/UL (ref 1.7–7.7)
NEUTROPHILS RELATIVE PERCENT: 59.7 %
NITRITE, URINE: NEGATIVE
PDW BLD-RTO: 16.1 % (ref 12.4–15.4)
PH UA: 7 (ref 5–8)
PLATELET # BLD: 247 K/UL (ref 135–450)
PMV BLD AUTO: 10.1 FL (ref 5–10.5)
PROTEIN UA: NEGATIVE MG/DL
RBC # BLD: 4.77 M/UL (ref 4–5.2)
RBC UA: 8 /HPF (ref 0–4)
SPECIFIC GRAVITY UA: 1.02 (ref 1–1.03)
UROBILINOGEN, URINE: 0.2 E.U./DL
WBC # BLD: 7.2 K/UL (ref 4–11)
WBC UA: 9 /HPF (ref 0–5)

## 2019-09-13 PROCEDURE — G0008 ADMIN INFLUENZA VIRUS VAC: HCPCS | Performed by: FAMILY MEDICINE

## 2019-09-13 PROCEDURE — 90653 IIV ADJUVANT VACCINE IM: CPT | Performed by: FAMILY MEDICINE

## 2019-09-13 PROCEDURE — 99214 OFFICE O/P EST MOD 30 MIN: CPT | Performed by: FAMILY MEDICINE

## 2019-09-13 ASSESSMENT — ENCOUNTER SYMPTOMS: SHORTNESS OF BREATH: 0

## 2019-09-13 NOTE — PROGRESS NOTES
Jamarcus Davis MD at [address] Right 06/19/2018    right total knee replacement    KNEE ARTHROSCOPY      TONSILLECTOMY      UPPER GASTROINTESTINAL ENDOSCOPY  3/15/13     Most Recent Immunizations   Administered Date(s) Administered    Influenza Virus Vaccine 10/23/2016    Influenza, High Dose (Fluzone 65 yrs and older) 09/10/2018    Influenza, Triv, inactivated, subunit, adjuvanted, IM (Fluad 65 yrs and older) 09/13/2019    Pneumococcal Conjugate 13-valent ([id number]) 11/11/2015    Pneumococcal Polysaccharide ([id number]) 01/22/2018    Tdap (Boostrix, Adacel) 06/12/2014    Zoster Live (Zostavax) 11/04/2012        Current Outpatient Medications   Medication Sig Dispense Refill    montelukast (SINGULAIR) 10 MG tablet Take 1 tablet by mouth nightly 30 tablet 3    ferrous sulfate 325 (65 Fe) MG tablet Take 1 tablet by mouth daily (with breakfast) 90 tablet 1    fluticasone-salmeterol (ADVAIR DISKUS) 100-50 MCG/DOSE diskus inhaler INHALE ONE PUFF BY MOUTH EVERY 12 HOURS 60 each 3    Naproxen Sodium (ALEVE) 220 MG CAPS Take by mouth daily as needed for Pain      CALCIUM PO Take by mouth      Multiple Vitamins-Minerals (CENTRUM SILVER 50+WOMEN) TABS Take by mouth      albuterol (VENTOLIN HFA) 108 (90 BASE) MCG/ACT inhaler Inhale 2 puffs into the lungs every 4 hours as needed for Wheezing. 1 Inhaler 5    Cetirizine HCl (ALL DAY ALLERGY PO) Take  by mouth daily. No current facility-administered medications for this visit. Allergies   Allergen Reactions    Cephalexin      Vomiting    Oxycodone     Erythromycin Nausea And Vomiting       Social History     Socioeconomic History    Marital status:       Spouse name: Not on file    Number of children: 3    Years of education: 25    Highest education level: Not on file   Occupational History    Occupation: therapist     Comment: [employer]   Social Needs    Financial resource strain: Not on file   [place] atraumatic. Right Ear: External ear normal.   Left Ear: External ear normal.   Difficult to see the left TM even after irrigation at bedside. Right TM unremarkable. Eyes: Conjunctivae and EOM are normal.   Cardiovascular: Normal rate, regular rhythm and normal heart sounds. Pulmonary/Chest: Effort normal. No stridor. No respiratory distress. She has no wheezes. Neurological: She is alert and oriented to person, place, and time. Skin: Skin is warm and dry. She is not diaphoretic. Vitals reviewed. ASSESSMENT:   Well Adult, See encounter diagnoses  Leandra Greene was seen today for other. Diagnoses and all orders for this visit:    Dizziness  Will refer to ENT for evaluation of vestibular causes of her dizziness. -     External Referral To ENT    Microcytic anemia  Patient is taking her iron supplements. Last colonoscopy 9/2014 unremarkable. -     CBC with Differential    History of hematuria  -     URINALYSIS WITH MICROSCOPIC    Need for vaccination  -     INFLUENZA, TRIV, INACTIVATED, SUBUNIT, ADJUVANTED, 65 YRS AND OLDER, IM, PREFILL SYR, 0.5ML (FLUAD TRIV)    Allergic rhinitis, unspecified seasonality, unspecified trigger  Advised to switch to Claritin and see if it/drowsy. Plan:   See orders and medications filed with this encounter. Return if symptoms worsen or fail to improve.

## 2019-10-11 ENCOUNTER — TELEPHONE (OUTPATIENT)
Dept: FAMILY MEDICINE CLINIC | Age: 79
End: 2019-10-11

## 2019-10-15 ENCOUNTER — OFFICE VISIT (OUTPATIENT)
Dept: FAMILY MEDICINE CLINIC | Age: 79
End: 2019-10-15
Payer: MEDICARE

## 2019-10-15 ENCOUNTER — TELEPHONE (OUTPATIENT)
Dept: FAMILY MEDICINE CLINIC | Age: 79
End: 2019-10-15

## 2019-10-15 VITALS
HEIGHT: 62 IN | SYSTOLIC BLOOD PRESSURE: 138 MMHG | HEART RATE: 78 BPM | WEIGHT: 139.2 LBS | OXYGEN SATURATION: 98 % | DIASTOLIC BLOOD PRESSURE: 78 MMHG | BODY MASS INDEX: 25.62 KG/M2

## 2019-10-15 DIAGNOSIS — Z01.818 PRE-OP EVALUATION: Primary | ICD-10-CM

## 2019-10-15 DIAGNOSIS — Z87.448 HISTORY OF HEMATURIA: ICD-10-CM

## 2019-10-15 PROCEDURE — 93000 ELECTROCARDIOGRAM COMPLETE: CPT | Performed by: FAMILY MEDICINE

## 2019-10-15 PROCEDURE — 99214 OFFICE O/P EST MOD 30 MIN: CPT | Performed by: FAMILY MEDICINE

## 2019-12-13 ENCOUNTER — OFFICE VISIT (OUTPATIENT)
Dept: FAMILY MEDICINE CLINIC | Age: 79
End: 2019-12-13
Payer: MEDICARE

## 2019-12-13 VITALS
SYSTOLIC BLOOD PRESSURE: 138 MMHG | WEIGHT: 139.6 LBS | DIASTOLIC BLOOD PRESSURE: 70 MMHG | HEART RATE: 72 BPM | OXYGEN SATURATION: 99 % | TEMPERATURE: 98.1 F | BODY MASS INDEX: 25.53 KG/M2

## 2019-12-13 DIAGNOSIS — R42 DIZZINESS: Primary | ICD-10-CM

## 2019-12-13 DIAGNOSIS — R73.03 PRE-DIABETES: ICD-10-CM

## 2019-12-13 DIAGNOSIS — D50.9 MICROCYTIC ANEMIA: ICD-10-CM

## 2019-12-13 LAB
ANION GAP SERPL CALCULATED.3IONS-SCNC: 14 MMOL/L (ref 3–16)
BASOPHILS ABSOLUTE: 0.1 K/UL (ref 0–0.2)
BASOPHILS RELATIVE PERCENT: 1.1 %
BUN BLDV-MCNC: 20 MG/DL (ref 7–20)
CALCIUM SERPL-MCNC: 9.4 MG/DL (ref 8.3–10.6)
CHLORIDE BLD-SCNC: 101 MMOL/L (ref 99–110)
CO2: 27 MMOL/L (ref 21–32)
CREAT SERPL-MCNC: 0.6 MG/DL (ref 0.6–1.2)
EOSINOPHILS ABSOLUTE: 0.3 K/UL (ref 0–0.6)
EOSINOPHILS RELATIVE PERCENT: 5.9 %
FERRITIN: 44.8 NG/ML (ref 15–150)
GFR AFRICAN AMERICAN: >60
GFR NON-AFRICAN AMERICAN: >60
GLUCOSE BLD-MCNC: 92 MG/DL (ref 70–99)
HCT VFR BLD CALC: 38.9 % (ref 36–48)
HEMOGLOBIN: 12.8 G/DL (ref 12–16)
IRON SATURATION: 24 % (ref 15–50)
IRON: 80 UG/DL (ref 37–145)
LYMPHOCYTES ABSOLUTE: 1.3 K/UL (ref 1–5.1)
LYMPHOCYTES RELATIVE PERCENT: 27.8 %
MCH RBC QN AUTO: 27.6 PG (ref 26–34)
MCHC RBC AUTO-ENTMCNC: 33 G/DL (ref 31–36)
MCV RBC AUTO: 83.4 FL (ref 80–100)
MONOCYTES ABSOLUTE: 0.6 K/UL (ref 0–1.3)
MONOCYTES RELATIVE PERCENT: 11.5 %
NEUTROPHILS ABSOLUTE: 2.6 K/UL (ref 1.7–7.7)
NEUTROPHILS RELATIVE PERCENT: 53.7 %
PDW BLD-RTO: 14.2 % (ref 12.4–15.4)
PLATELET # BLD: 244 K/UL (ref 135–450)
PMV BLD AUTO: 9.6 FL (ref 5–10.5)
POTASSIUM SERPL-SCNC: 4.4 MMOL/L (ref 3.5–5.1)
RBC # BLD: 4.66 M/UL (ref 4–5.2)
SODIUM BLD-SCNC: 142 MMOL/L (ref 136–145)
TOTAL IRON BINDING CAPACITY: 327 UG/DL (ref 260–445)
WBC # BLD: 4.8 K/UL (ref 4–11)

## 2019-12-13 PROCEDURE — 99213 OFFICE O/P EST LOW 20 MIN: CPT | Performed by: NURSE PRACTITIONER

## 2019-12-13 PROCEDURE — 36415 COLL VENOUS BLD VENIPUNCTURE: CPT | Performed by: NURSE PRACTITIONER

## 2019-12-13 RX ORDER — FERROUS SULFATE 325(65) MG
325 TABLET ORAL
Qty: 90 TABLET | Refills: 1 | Status: SHIPPED | OUTPATIENT
Start: 2019-12-13 | End: 2020-09-26

## 2019-12-13 ASSESSMENT — ENCOUNTER SYMPTOMS
RESPIRATORY NEGATIVE: 1
GASTROINTESTINAL NEGATIVE: 1

## 2019-12-14 LAB
ESTIMATED AVERAGE GLUCOSE: 111.2 MG/DL
HBA1C MFR BLD: 5.5 %

## 2019-12-27 ENCOUNTER — OFFICE VISIT (OUTPATIENT)
Dept: FAMILY MEDICINE CLINIC | Age: 79
End: 2019-12-27
Payer: MEDICARE

## 2019-12-27 VITALS
WEIGHT: 140 LBS | BODY MASS INDEX: 25.76 KG/M2 | HEIGHT: 62 IN | OXYGEN SATURATION: 98 % | SYSTOLIC BLOOD PRESSURE: 120 MMHG | HEART RATE: 93 BPM | DIASTOLIC BLOOD PRESSURE: 70 MMHG | TEMPERATURE: 100.5 F

## 2019-12-27 DIAGNOSIS — R52 BODY ACHES: ICD-10-CM

## 2019-12-27 DIAGNOSIS — J40 BRONCHITIS: Primary | ICD-10-CM

## 2019-12-27 LAB
INFLUENZA A ANTIGEN, POC: NORMAL
INFLUENZA B ANTIGEN, POC: NORMAL

## 2019-12-27 PROCEDURE — 87804 INFLUENZA ASSAY W/OPTIC: CPT | Performed by: NURSE PRACTITIONER

## 2019-12-27 PROCEDURE — 99213 OFFICE O/P EST LOW 20 MIN: CPT | Performed by: NURSE PRACTITIONER

## 2019-12-27 RX ORDER — DOXYCYCLINE HYCLATE 100 MG
100 TABLET ORAL 2 TIMES DAILY
Qty: 20 TABLET | Refills: 0 | Status: SHIPPED | OUTPATIENT
Start: 2019-12-27 | End: 2020-01-06

## 2019-12-27 RX ORDER — BROMPHENIRAMINE MALEATE, PSEUDOEPHEDRINE HYDROCHLORIDE, AND DEXTROMETHORPHAN HYDROBROMIDE 2; 30; 10 MG/5ML; MG/5ML; MG/5ML
5 SYRUP ORAL 4 TIMES DAILY PRN
Qty: 180 ML | Refills: 0 | Status: SHIPPED | OUTPATIENT
Start: 2019-12-27 | End: 2020-07-06 | Stop reason: ALTCHOICE

## 2019-12-27 ASSESSMENT — ENCOUNTER SYMPTOMS
SHORTNESS OF BREATH: 0
COUGH: 1
EYE DISCHARGE: 0
CHEST TIGHTNESS: 0
SORE THROAT: 1
SINUS PRESSURE: 1
NAUSEA: 1
FLU SYMPTOMS: 1
RHINORRHEA: 0
SINUS PAIN: 0
VOICE CHANGE: 1

## 2020-02-20 NOTE — PROGRESS NOTES
sunglasses), sunscreen use (water resistant, broad spectrum, SPF at least 30, need for reapplication every 2 to 3 hours), avoidance of tanning beds  -Full skin exam in 1 year (sooner if indicated)      2. Actinic keratosis(es)  -Edu re: relationship with chronic cumulative sun exposure, low premalignant potential.   -7 lesion(s) treated w/ liquid nitrogen x 2 cycles - L lateral eyebrow 1, L posterior auricular 4, R posterior auricular 1, L upper chest 1 . Edu re: risk of blister formation, discomfort, scar, hypopigmentation. Discussed wound care. 3. Skin tag(s), irritated  -1 lesion(s) treated w/ liquid nitrogen x 2 cycles - R neck base. Edu re: risk of blister formation, discomfort, scar, hypopigmentation. Discussed wound care. 4. Seborrheic dermatitis, occipital scalp   -Lidex soln qd prn flares. Edu re: sparing use, atrophy, striae, hypopigmentation, telangiectasias.

## 2020-02-24 ENCOUNTER — OFFICE VISIT (OUTPATIENT)
Dept: DERMATOLOGY | Age: 80
End: 2020-02-24
Payer: MEDICARE

## 2020-02-24 PROCEDURE — 17003 DESTRUCT PREMALG LES 2-14: CPT | Performed by: DERMATOLOGY

## 2020-02-24 PROCEDURE — 17000 DESTRUCT PREMALG LESION: CPT | Performed by: DERMATOLOGY

## 2020-02-24 PROCEDURE — 11200 RMVL SKIN TAGS UP TO&INC 15: CPT | Performed by: DERMATOLOGY

## 2020-02-24 PROCEDURE — 99214 OFFICE O/P EST MOD 30 MIN: CPT | Performed by: DERMATOLOGY

## 2020-02-24 RX ORDER — FLUOCINONIDE TOPICAL SOLUTION USP, 0.05% 0.5 MG/ML
SOLUTION TOPICAL
Qty: 60 ML | Refills: 2 | Status: SHIPPED | OUTPATIENT
Start: 2020-02-24 | End: 2021-10-04 | Stop reason: SDUPTHER

## 2020-03-30 ENCOUNTER — TELEPHONE (OUTPATIENT)
Dept: FAMILY MEDICINE CLINIC | Age: 80
End: 2020-03-30

## 2020-03-30 NOTE — TELEPHONE ENCOUNTER
Patient called in wanting to know her blood type. Did advise pt to call her local hospital where she last had surgery done and she stated that Dr. Jeremy Cagle should have all her lab orders and blood work from her last surgery because her surgeon sent it over, please advise.

## 2020-07-06 ENCOUNTER — OFFICE VISIT (OUTPATIENT)
Dept: FAMILY MEDICINE CLINIC | Age: 80
End: 2020-07-06
Payer: MEDICARE

## 2020-07-06 VITALS
BODY MASS INDEX: 27.46 KG/M2 | SYSTOLIC BLOOD PRESSURE: 126 MMHG | OXYGEN SATURATION: 97 % | TEMPERATURE: 97.8 F | HEART RATE: 73 BPM | RESPIRATION RATE: 16 BRPM | WEIGHT: 149.2 LBS | HEIGHT: 62 IN | DIASTOLIC BLOOD PRESSURE: 86 MMHG

## 2020-07-06 PROCEDURE — 99214 OFFICE O/P EST MOD 30 MIN: CPT | Performed by: FAMILY MEDICINE

## 2020-07-06 PROCEDURE — 36415 COLL VENOUS BLD VENIPUNCTURE: CPT | Performed by: FAMILY MEDICINE

## 2020-07-06 PROCEDURE — G8510 SCR DEP NEG, NO PLAN REQD: HCPCS | Performed by: FAMILY MEDICINE

## 2020-07-06 PROCEDURE — 3288F FALL RISK ASSESSMENT DOCD: CPT | Performed by: FAMILY MEDICINE

## 2020-07-06 RX ORDER — ALBUTEROL SULFATE 90 UG/1
2 AEROSOL, METERED RESPIRATORY (INHALATION) EVERY 4 HOURS PRN
Qty: 1 INHALER | Refills: 5 | Status: SHIPPED | OUTPATIENT
Start: 2020-07-06 | End: 2021-10-04 | Stop reason: SDUPTHER

## 2020-07-06 RX ORDER — MONTELUKAST SODIUM 10 MG/1
10 TABLET ORAL NIGHTLY
Qty: 30 TABLET | Refills: 5 | Status: SHIPPED | OUTPATIENT
Start: 2020-07-06 | End: 2021-04-12 | Stop reason: SDUPTHER

## 2020-07-06 ASSESSMENT — ENCOUNTER SYMPTOMS: SHORTNESS OF BREATH: 0

## 2020-07-06 ASSESSMENT — PATIENT HEALTH QUESTIONNAIRE - PHQ9
1. LITTLE INTEREST OR PLEASURE IN DOING THINGS: 0
2. FEELING DOWN, DEPRESSED OR HOPELESS: 0
SUM OF ALL RESPONSES TO PHQ9 QUESTIONS 1 & 2: 0
SUM OF ALL RESPONSES TO PHQ QUESTIONS 1-9: 0
SUM OF ALL RESPONSES TO PHQ QUESTIONS 1-9: 0

## 2020-07-06 NOTE — LETTER
[address] [facility] [address]  [place] [facility] [address]  Phone: [phone]  Fax: [phone]    Luis Felipe Kamara MD        July 6, 2020     Patient: Lakia Lan   YOB: 1940   Date of Visit: 7/6/2020       To Whom It May Concern: It is my medical opinion that Arthur Crocker requires a disability parking placard for the following reasons:  She cannot walk 200 feet without stopping to rest.  Duration of need: 5 years    If you have any questions or concerns, please don't hesitate to call.     Sincerely,        Luis Felipe Kamara MD

## 2020-07-06 NOTE — PROGRESS NOTES
Chief Complaint   Patient presents with    Follow-up     on medications          HPI      79 y.o. female presents today for follow-up. History of iron deficiency anemia has not been taking her iron supplements for the past 6 months. Patients last colonoscopy was 2014. Hx of asthma takes advair daily. Albuterol use is once per month.      Patient Active Problem List   Diagnosis    AR (allergic rhinitis)    Asthma    Recurrent UTI    Hammer toe    Anemia    Hematuria    Left hamstring muscle strain    Numbness and tingling in right hand    Left knee pain    Hammer toe of right foot    Arthritis of right knee    Status post total left knee replacement     Past Medical History:   Diagnosis Date    Arthritis     Asthma     mild    Hematuria, microscopic 02/2018    Knee joint disorder 2018    left knee injection x3    Knee pain, right     PONV (postoperative nausea and vomiting)     Reflux     Seasonal allergies        Past Surgical History:   Procedure Laterality Date    CATARACT REMOVAL WITH IMPLANT Right 04/05/2019    CATARACT REMOVAL WITH IMPLANT Left 06/27/2019     PHACO EMULSIFICATION OF CATARACT WITH INTRAOCULAR LENS IMPLANT EYE    COLONOSCOPY  2009    COLONOSCOPY  9/17/14    polyp    CYSTOSCOPY  02/2018    DILATION AND CURETTAGE OF UTERUS      FOOT SURGERY Bilateral [id number]    rt ft 3rd digit proximalinterphalangeal joint arthrodesis/lt  ft 5th/2nd proximal interphalangeal joint arthrodesis    INTRACAPSULAR CATARACT EXTRACTION Right 4/5/2019    PHACO EMULSIFICATION OF CATARACT WITH  INTRAOCULAR LENS IMPLANT RIGHT EYE performed by Meera Leigh MD at [address] [place] [address] Left 6/27/2019    PHACO EMULSIFICATION OF CATARACT WITH INTRAOCULAR LENS IMPLANT EYE performed by Meera Leigh MD at [address] [place] [address] Right 06/19/2018    right total knee replacement    KNEE ARTHROSCOPY      TONSILLECTOMY      UPPER GASTROINTESTINAL ENDOSCOPY 3/15/13     Most Recent Immunizations   Administered Date(s) Administered    Influenza Virus Vaccine 10/23/2016    Influenza, High Dose (Fluzone 65 yrs and older) 09/10/2018    Influenza, Triv, inactivated, subunit, adjuvanted, IM (Fluad 65 yrs and older) 09/13/2019    Pneumococcal Conjugate 13-valent ([id number]) 11/11/2015    Pneumococcal Polysaccharide ([id number]) 01/22/2018    Tdap (Boostrix, Adacel) 06/12/2014    Zoster Live (Zostavax) 11/04/2012        Current Outpatient Medications   Medication Sig Dispense Refill    albuterol sulfate HFA (VENTOLIN HFA) 108 (90 Base) MCG/ACT inhaler Inhale 2 puffs into the lungs every 4 hours as needed for Wheezing 1 Inhaler 5    fluticasone-salmeterol (ADVAIR) 100-50 MCG/DOSE diskus inhaler INHALE ONE PUFF BY MOUTH EVERY 12 HOURS 60 each 1    montelukast (SINGULAIR) 10 MG tablet Take 1 tablet by mouth nightly 30 tablet 5    fluocinonide (LIDEX) 0.05 % external solution Apply sparingly to affected area(s) of scalp qd prn flares. 60 mL 2    Naproxen Sodium (ALEVE) 220 MG CAPS Take by mouth daily as needed for Pain      Multiple Vitamins-Minerals (CENTRUM SILVER 50+WOMEN) TABS Take by mouth      Cetirizine HCl (ALL DAY ALLERGY PO) Take  by mouth daily.  ferrous sulfate 325 (65 Fe) MG tablet Take 1 tablet by mouth daily (with breakfast) (Patient not taking: Reported on 7/6/2020) 90 tablet 1     No current facility-administered medications for this visit. Allergies   Allergen Reactions    Cephalexin      Vomiting    Oxycodone     Erythromycin Nausea And Vomiting       Social History     Socioeconomic History    Marital status:       Spouse name: None    Number of children: 4    Years of education: 25    Highest education level: None   Occupational History    Occupation: therapist     Comment: [employer]   Social Needs    Financial resource strain: None    Food insecurity     Worry: None     Inability: None    Transportation needs Medical: None     Non-medical: None   Tobacco Use    Smoking status: Never Smoker    Smokeless tobacco: Never Used   Substance and Sexual Activity    Alcohol use: No     Alcohol/week: 0.0 standard drinks    Drug use: No    Sexual activity: Never   Lifestyle    Physical activity     Days per week: None     Minutes per session: None    Stress: None   Relationships    Social connections     Talks on phone: None     Gets together: None     Attends Yarsanism service: None     Active member of club or organization: None     Attends meetings of clubs or organizations: None     Relationship status: None    Intimate partner violence     Fear of current or ex partner: None     Emotionally abused: None     Physically abused: None     Forced sexual activity: None   Other Topics Concern    None   Social History Narrative    None     Family History   Problem Relation Age of Onset    Heart Disease Mother     Arthritis Mother     Cancer Father     Arthritis Father     Arthritis Sister     Rheum Arthritis Brother     Arthritis Brother     Asthma Brother     Asthma Other     Arthritis Brother     Arthritis Brother     Arthritis Brother     Arthritis Brother     Asthma Son                               Review Of Systems    Review of Systems   Constitutional: Negative for chills and fever. Respiratory: Negative for shortness of breath. Cardiovascular: Negative for chest pain. PHYSICAL EXAMINATION:    /86 (Site: Left Upper Arm, Position: Sitting, Cuff Size: Medium Adult)   Pulse 73   Temp 97.8 °F (36.6 °C)   Resp 16   Ht 5' 2\" (1.575 m)   Wt 149 lb 3.2 oz (67.7 kg)   SpO2 97%   BMI 27.29 kg/m²      Physical Exam  Vitals signs reviewed. Constitutional:       Appearance: Normal appearance. HENT:      Head: Normocephalic and atraumatic. Comments: Irrigated at bedside by MA. TM and ear canal within normal limits. Left Ear: There is impacted cerumen.    Eyes:      Extraocular Movements: Extraocular movements intact. Conjunctiva/sclera: Conjunctivae normal.   Cardiovascular:      Rate and Rhythm: Normal rate. Heart sounds: Normal heart sounds. Pulmonary:      Effort: Pulmonary effort is normal.      Breath sounds: Normal breath sounds. Skin:     General: Skin is warm and dry. Neurological:      General: No focal deficit present. Mental Status: She is alert and oriented to person, place, and time. ASSESSMENT:   Well Adult, See encounter diagnoses  Dave Calloway was seen today for follow-up. Diagnoses and all orders for this visit:    Pre-diabetes  -     Hemoglobin A1C    Dyslipidemia  -     Lipid Panel    Iron deficiency anemia, unspecified iron deficiency anemia type  -     CBC Auto Differential    Allergic rhinitis, unspecified seasonality, unspecified trigger  -     montelukast (SINGULAIR) 10 MG tablet; Take 1 tablet by mouth nightly    Mild persistent asthma without complication  -     albuterol sulfate HFA (VENTOLIN HFA) 108 (90 Base) MCG/ACT inhaler; Inhale 2 puffs into the lungs every 4 hours as needed for Wheezing  -     fluticasone-salmeterol (ADVAIR) 100-50 MCG/DOSE diskus inhaler; INHALE ONE PUFF BY MOUTH EVERY 12 HOURS          Plan:   See orders and medications filed with this encounter. Return in about 6 months (around 1/6/2021).

## 2020-07-07 LAB
BASOPHILS ABSOLUTE: 0.1 K/UL (ref 0–0.2)
BASOPHILS RELATIVE PERCENT: 1.1 %
CHOLESTEROL, TOTAL: 202 MG/DL (ref 0–199)
EOSINOPHILS ABSOLUTE: 0.3 K/UL (ref 0–0.6)
EOSINOPHILS RELATIVE PERCENT: 6.6 %
ESTIMATED AVERAGE GLUCOSE: 111.2 MG/DL
HBA1C MFR BLD: 5.5 %
HCT VFR BLD CALC: 40 % (ref 36–48)
HDLC SERPL-MCNC: 69 MG/DL (ref 40–60)
HEMOGLOBIN: 13 G/DL (ref 12–16)
LDL CHOLESTEROL CALCULATED: 108 MG/DL
LYMPHOCYTES ABSOLUTE: 1.4 K/UL (ref 1–5.1)
LYMPHOCYTES RELATIVE PERCENT: 26.2 %
MCH RBC QN AUTO: 27.1 PG (ref 26–34)
MCHC RBC AUTO-ENTMCNC: 32.4 G/DL (ref 31–36)
MCV RBC AUTO: 83.6 FL (ref 80–100)
MONOCYTES ABSOLUTE: 0.6 K/UL (ref 0–1.3)
MONOCYTES RELATIVE PERCENT: 11.3 %
NEUTROPHILS ABSOLUTE: 2.9 K/UL (ref 1.7–7.7)
NEUTROPHILS RELATIVE PERCENT: 54.8 %
PDW BLD-RTO: 15.2 % (ref 12.4–15.4)
PLATELET # BLD: 236 K/UL (ref 135–450)
PMV BLD AUTO: 9.7 FL (ref 5–10.5)
RBC # BLD: 4.78 M/UL (ref 4–5.2)
TRIGL SERPL-MCNC: 125 MG/DL (ref 0–150)
VLDLC SERPL CALC-MCNC: 25 MG/DL
WBC # BLD: 5.2 K/UL (ref 4–11)

## 2020-08-07 ENCOUNTER — TELEPHONE (OUTPATIENT)
Dept: FAMILY MEDICINE CLINIC | Age: 80
End: 2020-08-07

## 2020-08-07 NOTE — TELEPHONE ENCOUNTER
Patient stated that in the afternoon she is getting my saliva in her mouth and wants to know what she should do to stop this?

## 2020-08-17 ENCOUNTER — OFFICE VISIT (OUTPATIENT)
Dept: ENT CLINIC | Age: 80
End: 2020-08-17
Payer: MEDICARE

## 2020-08-17 VITALS
TEMPERATURE: 97.4 F | HEIGHT: 62 IN | WEIGHT: 152.6 LBS | SYSTOLIC BLOOD PRESSURE: 92 MMHG | BODY MASS INDEX: 28.08 KG/M2 | DIASTOLIC BLOOD PRESSURE: 68 MMHG | HEART RATE: 80 BPM

## 2020-08-17 PROCEDURE — 99203 OFFICE O/P NEW LOW 30 MIN: CPT | Performed by: OTOLARYNGOLOGY

## 2020-08-17 RX ORDER — FLUTICASONE PROPIONATE 50 MCG
1 SPRAY, SUSPENSION (ML) NASAL 2 TIMES DAILY
Qty: 1 BOTTLE | Refills: 2 | Status: SHIPPED | OUTPATIENT
Start: 2020-08-17 | End: 2021-10-04 | Stop reason: SDUPTHER

## 2020-08-17 NOTE — PATIENT INSTRUCTIONS
Ear instructions:  -Use vaseline three times a day to the ears (ear eczema)    Nasal instructions  -Use flonase twice daily as prescribed

## 2020-08-17 NOTE — PROGRESS NOTES
[address] [facility] SURGERY  NEW PATIENT HISTORY AND PHYSICAL NOTE      Patient Name: [facility] Record Number:  [id number]  Primary Care Physician:  Amy Anders MD    ChiefComplaint     Chief Complaint   Patient presents with    Allergic Rhinitis      Has some seasonal allergies, is taking allergy medications but has a lot of post nasal drip. States that by 4pm she is Full and she will lose her voice. Had allergy testing long ago (over 30 years ago)       History of Present Illness     Jordan Cousins is an 79 y.o. female history of allergies with complaint of nasal/facial congestion and poor nasal airflow, eye watering and post-nasal drip resulting in hoarseness (usually at the end of the day) and intermittent dry cough. She also states the development of laryngopharyngeal reflux (daily) towards bedtime - has globus sensation. No history of falls/trauma to the nose. Nasal congestion does not worsen with eating. Has not been on any significant courses of nasal sprays. No purulent rhinorrhea or epistaxis. She has prior history of SCIT ~30 years ago, had allergy testing completed (notes significant allergy to cats, has 2 cats in her house). Has mild asthma for which she takes advair (fluticasone/salmoterol) Denies tobacco use, EtOH, coffee (2 cups daily), soda (1 can daily), 4 glasses of water daily.      Past Medical History     Past Medical History:   Diagnosis Date    Allergic rhinitis     Arthritis     Asthma     mild    GERD (gastroesophageal reflux disease)     Hematuria, microscopic 02/2018    Knee joint disorder 2018    left knee injection x3    Knee pain, right     PONV (postoperative nausea and vomiting)     Reflux     Seasonal allergies     Tinnitus        Past Surgical History     Past Surgical History:   Procedure Laterality Date    CATARACT REMOVAL WITH IMPLANT Right 04/05/2019    CATARACT REMOVAL WITH IMPLANT Left 06/27/2019 [facility]O EMULSIFICATION OF CATARACT WITH INTRAOCULAR LENS IMPLANT EYE    COLONOSCOPY  2009    COLONOSCOPY  9/17/14    polyp    CYSTOSCOPY  02/2018    DILATION AND CURETTAGE OF UTERUS      FOOT SURGERY Bilateral [id number]    rt ft 3rd digit proximalinterphalangeal joint arthrodesis/lt  ft 5th/2nd proximal interphalangeal joint arthrodesis    INTRACAPSULAR CATARACT EXTRACTION Right 4/5/2019    PHACO EMULSIFICATION OF CATARACT WITH  INTRAOCULAR LENS IMPLANT RIGHT EYE performed by Aquilla Babinski, MD at [address] [facility] Left 6/27/2019    PHACO EMULSIFICATION OF CATARACT WITH INTRAOCULAR LENS IMPLANT EYE performed by Aquilla Babinski, MD at [address] [facility] Right 06/19/2018    right total knee replacement    KNEE ARTHROSCOPY      TONSILLECTOMY      UPPER GASTROINTESTINAL ENDOSCOPY  3/15/13       Family History     Family History   Problem Relation Age of Onset    Heart Disease Mother     Arthritis Mother     Cancer Father     Arthritis Father     Arthritis Sister     Rheum Arthritis Brother     Arthritis Brother     Asthma Brother     Asthma Other     Arthritis Brother     Arthritis Brother     Arthritis Brother     Arthritis Brother     Asthma Son     Seizures Sister     Seizures Sister        Social History     Social History     Tobacco Use    Smoking status: Never Smoker    Smokeless tobacco: Never Used   Substance Use Topics    Alcohol use: No     Alcohol/week: 0.0 standard drinks    Drug use: No        Allergies     Allergies   Allergen Reactions    Cephalexin      Vomiting    Oxycodone     Erythromycin Nausea And Vomiting       Medications     Current Outpatient Medications   Medication Sig Dispense Refill    fluticasone (FLONASE) 50 MCG/ACT nasal spray 1 spray by Each Nostril route 2 times daily 1 Bottle 2    albuterol sulfate HFA (VENTOLIN HFA) 108 (90 Base) MCG/ACT inhaler Inhale 2 puffs into the lungs every 4 hours as needed for Wheezing 1 Inhaler 5    fluticasone-salmeterol (ADVAIR) 100-50 MCG/DOSE diskus inhaler INHALE ONE PUFF BY MOUTH EVERY 12 HOURS 60 each 1    montelukast (SINGULAIR) 10 MG tablet Take 1 tablet by mouth nightly 30 tablet 5    fluocinonide (LIDEX) 0.05 % external solution Apply sparingly to affected area(s) of scalp qd prn flares. 60 mL 2    ferrous sulfate 325 (65 Fe) MG tablet Take 1 tablet by mouth daily (with breakfast) 90 tablet 1    Naproxen Sodium (ALEVE) 220 MG CAPS Take by mouth daily as needed for Pain      Multiple Vitamins-Minerals (CENTRUM SILVER 50+WOMEN) TABS Take by mouth      Cetirizine HCl (ALL DAY ALLERGY PO) Take  by mouth daily. No current facility-administered medications for this visit.         Review of Systems     REVIEW OF SYSTEMS  The following systems were reviewed and revealed the following in addition to any already discussed in the HPI:    CONSTITUTIONAL: No weight loss, no fever, no night sweats, no chills  EYES: no vision changes, + dry eyes, no blurry vision  EARS: no changes in hearing, no otalgia  NOSE: no epistaxis, + rhinorrhea  RESPIRATORY: No difficulty breathing, no shortness of breath  CV: no chest pain, no peripheral vascular disease  HEME: No coagulation disorder, no bleeding disorder  NEURO: No TIA or stroke-like symptoms  SKIN: No new rashes in the head and neck, no recent skin cancers  MOUTH: No new ulcers, no recent teeth infections  GASTROINTESTINAL: No diarrhea, stomach pain  PSYCH: No anxiety, no depression    PhysicalExam     Vitals:    08/17/20 0902   BP: 92/68   Site: Left Upper Arm   Position: Sitting   Cuff Size: Medium Adult   Pulse: 80   Temp: 97.4 °F (36.3 °C)   TempSrc: Infrared   Weight: 152 lb 9.6 oz (69.2 kg)   Height: 5' 2\" (1.575 m)       PHYSICAL EXAM  BP 92/68 (Site: Left Upper Arm, Position: Sitting, Cuff Size: Medium Adult)   Pulse 80   Temp 97.4 °F (36.3 °C) (Infrared)   Ht 5' 2\" (1.575 m)   Wt 152 lb 9.6 oz (69.2 kg)   BMI 27.91 kg/m² GENERAL: No Acute Distress, Alert and Oriented, no hoarseness  EYES: EOMI, Anti-icteric  NOSE: On anterior rhinoscopy there is no epistaxis, nasal mucosa within normal limits, no purulent drainage  EARS: Externally both ears show excoriations, dryness and eczema of the conchal bowl; on portable otomicroscopy:  -Right ear: External auditory canal without stenosis, tympanic membrane clear, no middle ear effusions or retractions  -Left ear: External auditory canal without stenosis, tympanic membrane clear, no middle ear effusions or retractions  FACE: 1/6 House-Brackmann Scale, symmetric, sensation equal bilaterally  ORAL CAVITY: No masses or lesions palpated, uvula is midline, moist mucous membranes, 0+ tonsils, good dentition  NECK: Normal range of motion, no thyromegaly, trachea is midline, no lymphadenopathy, no neck masses, no crepitus  CHEST: Normal respiratory effort, no retractions, breathing comfortably  SKIN: No rashes, normal appearing skin, no evidence of skin lesions/tumors  NEURO: CN 2, 3, 4, 5, 6, 7, 11, 12 intact bilaterally     Data/Imaging Review       Procedure           Assessment and Plan     1. Allergic rhinitis due to animal hair and dander  Patient with history of allergies to cats (on previous allergy testing), has undergone subcutaneous immunotherapy approximately 30 years ago although results unclear. She states that she has been having ongoing nasal congestion, clear rhinorrhea, postnasal drip resulting in forceps and has significant exposure to cats. We will trial her on topical nasal steroids, and correct body positioning has been outlined to the patient. - fluticasone (FLONASE) 50 MCG/ACT nasal spray; 1 spray by Each Nostril route 2 times daily  Dispense: 1 Bottle; Refill: 2    2.  Postnasal drip  As above, likely due to allergic rhinitis-we will trial her on topical therapy  - fluticasone (FLONASE) 50 MCG/ACT nasal spray; 1 spray by Each Nostril route 2 times daily  Dispense: 1 Bottle; Refill: 2    3. Laryngopharyngeal reflux (LPR)  Patient with intermittent globus sensation at night, especially if she eats spicy food. Symptoms may be due to laryngopharyngeal reflux- we have counseled her on lifestyle changes including decreasing caffeine intake, increasing water intake. 4. Nasal congestion  As above, likely due to allergic rhinitis, will treat with topical nasal steroids  - fluticasone (FLONASE) 50 MCG/ACT nasal spray; 1 spray by Each Nostril route 2 times daily  Dispense: 1 Bottle; Refill: 2    5. Eczema of external ear, bilateral  On examination the patient has significant dryness and eczema of the conchal bowl. When asked further, she states significant ear pruritus. We have asked her to keep the area moist with Vaseline, and we will recheck her ears at her follow-up visit. Return in about 6 weeks (around 9/28/2020). Omar Knight MD  [facility]  Department of Otolaryngology/Head and Neck Surgery  8/17/20    I have performed a head and neck physical exam personally or was physically present during the key or critical portions of the service. Medical Decision Making: The following items were considered in medical decision making:  Independent review of images  Review / order clinical lab tests  Review / order radiology tests  Decision to obtain old records  Review and summation of old records as accessed through [facility] (a summary of my findings in these old records: None)     Portions of this note were dictated using Dragon.  There may be linguistic errors secondary to the use of this program.

## 2020-09-25 ENCOUNTER — TELEPHONE (OUTPATIENT)
Dept: FAMILY MEDICINE CLINIC | Age: 80
End: 2020-09-25

## 2020-09-25 NOTE — TELEPHONE ENCOUNTER
Patient is asking for a call back she was feeling very achy and nauseous last night but today she only has back pain.  Should  she get a Covid test.     Please advise [phone]

## 2020-09-25 NOTE — TELEPHONE ENCOUNTER
She does not need to be tested for COVID unless she is having symptoms of cough, shortness of breath, congestion or fevers. If she just has back pain she should just continue to monitor symptoms. Can give her the covid number if she needs it in future.

## 2020-09-26 ENCOUNTER — APPOINTMENT (OUTPATIENT)
Dept: GENERAL RADIOLOGY | Age: 80
End: 2020-09-26
Payer: MEDICARE

## 2020-09-26 ENCOUNTER — HOSPITAL ENCOUNTER (EMERGENCY)
Age: 80
Discharge: HOME | End: 2020-09-26
Attending: EMERGENCY MEDICINE
Payer: MEDICARE

## 2020-09-26 ENCOUNTER — APPOINTMENT (OUTPATIENT)
Dept: CT IMAGING | Age: 80
End: 2020-09-26
Payer: MEDICARE

## 2020-09-26 ENCOUNTER — NURSE TRIAGE (OUTPATIENT)
Dept: OTHER | Facility: CLINIC | Age: 80
End: 2020-09-26

## 2020-09-26 VITALS
DIASTOLIC BLOOD PRESSURE: 69 MMHG | HEART RATE: 80 BPM | BODY MASS INDEX: 27.6 KG/M2 | SYSTOLIC BLOOD PRESSURE: 145 MMHG | HEIGHT: 62 IN | RESPIRATION RATE: 16 BRPM | TEMPERATURE: 98 F | WEIGHT: 150 LBS | OXYGEN SATURATION: 99 %

## 2020-09-26 LAB
ANION GAP SERPL CALCULATED.3IONS-SCNC: 9 MMOL/L (ref 3–16)
BASOPHILS ABSOLUTE: 0.1 K/UL (ref 0–0.2)
BASOPHILS RELATIVE PERCENT: 0.9 %
BILIRUBIN URINE: NEGATIVE
BLOOD, URINE: ABNORMAL
BUN BLDV-MCNC: 16 MG/DL (ref 7–20)
CALCIUM SERPL-MCNC: 9.1 MG/DL (ref 8.3–10.6)
CHLORIDE BLD-SCNC: 103 MMOL/L (ref 99–110)
CLARITY: CLEAR
CO2: 27 MMOL/L (ref 21–32)
COLOR: YELLOW
CREAT SERPL-MCNC: <0.5 MG/DL (ref 0.6–1.2)
EKG ATRIAL RATE: 69 BPM
EKG DIAGNOSIS: NORMAL
EKG P AXIS: 11 DEGREES
EKG P-R INTERVAL: 146 MS
EKG Q-T INTERVAL: 402 MS
EKG QRS DURATION: 92 MS
EKG QTC CALCULATION (BAZETT): 430 MS
EKG R AXIS: -25 DEGREES
EKG T AXIS: 6 DEGREES
EKG VENTRICULAR RATE: 69 BPM
EOSINOPHILS ABSOLUTE: 0.3 K/UL (ref 0–0.6)
EOSINOPHILS RELATIVE PERCENT: 4.2 %
EPITHELIAL CELLS, UA: ABNORMAL /HPF (ref 0–5)
GFR AFRICAN AMERICAN: >60
GFR NON-AFRICAN AMERICAN: >60
GLUCOSE BLD-MCNC: 81 MG/DL (ref 70–99)
GLUCOSE URINE: NEGATIVE MG/DL
HCT VFR BLD CALC: 36.8 % (ref 36–48)
HEMOGLOBIN: 12.1 G/DL (ref 12–16)
KETONES, URINE: NEGATIVE MG/DL
LEUKOCYTE ESTERASE, URINE: NEGATIVE
LIPASE: 31 U/L (ref 13–60)
LYMPHOCYTES ABSOLUTE: 1.7 K/UL (ref 1–5.1)
LYMPHOCYTES RELATIVE PERCENT: 24.8 %
MCH RBC QN AUTO: 26.9 PG (ref 26–34)
MCHC RBC AUTO-ENTMCNC: 33 G/DL (ref 31–36)
MCV RBC AUTO: 81.4 FL (ref 80–100)
MICROSCOPIC EXAMINATION: YES
MONOCYTES ABSOLUTE: 0.8 K/UL (ref 0–1.3)
MONOCYTES RELATIVE PERCENT: 11.6 %
NEUTROPHILS ABSOLUTE: 4.1 K/UL (ref 1.7–7.7)
NEUTROPHILS RELATIVE PERCENT: 58.5 %
NITRITE, URINE: NEGATIVE
PDW BLD-RTO: 14.2 % (ref 12.4–15.4)
PH UA: 6.5 (ref 5–8)
PLATELET # BLD: 291 K/UL (ref 135–450)
PMV BLD AUTO: 8.1 FL (ref 5–10.5)
POTASSIUM SERPL-SCNC: 4.3 MMOL/L (ref 3.5–5.1)
PROTEIN UA: NEGATIVE MG/DL
RBC # BLD: 4.52 M/UL (ref 4–5.2)
RBC UA: ABNORMAL /HPF (ref 0–4)
SODIUM BLD-SCNC: 139 MMOL/L (ref 136–145)
SPECIFIC GRAVITY UA: 1.01 (ref 1–1.03)
TROPONIN: <0.01 NG/ML
URINE REFLEX TO CULTURE: ABNORMAL
URINE TYPE: ABNORMAL
UROBILINOGEN, URINE: 0.2 E.U./DL
WBC # BLD: 6.9 K/UL (ref 4–11)
WBC UA: ABNORMAL /HPF (ref 0–5)

## 2020-09-26 PROCEDURE — 80048 BASIC METABOLIC PNL TOTAL CA: CPT

## 2020-09-26 PROCEDURE — 6360000002 HC RX W HCPCS: Performed by: EMERGENCY MEDICINE

## 2020-09-26 PROCEDURE — 6370000000 HC RX 637 (ALT 250 FOR IP): Performed by: STUDENT IN AN ORGANIZED HEALTH CARE EDUCATION/TRAINING PROGRAM

## 2020-09-26 PROCEDURE — 6360000004 HC RX CONTRAST MEDICATION: Performed by: EMERGENCY MEDICINE

## 2020-09-26 PROCEDURE — 99284 EMERGENCY DEPT VISIT MOD MDM: CPT

## 2020-09-26 PROCEDURE — 2500000003 HC RX 250 WO HCPCS: Performed by: EMERGENCY MEDICINE

## 2020-09-26 PROCEDURE — 96374 THER/PROPH/DIAG INJ IV PUSH: CPT

## 2020-09-26 PROCEDURE — 96375 TX/PRO/DX INJ NEW DRUG ADDON: CPT

## 2020-09-26 PROCEDURE — 93005 ELECTROCARDIOGRAM TRACING: CPT | Performed by: EMERGENCY MEDICINE

## 2020-09-26 PROCEDURE — 71046 X-RAY EXAM CHEST 2 VIEWS: CPT

## 2020-09-26 PROCEDURE — 93010 ELECTROCARDIOGRAM REPORT: CPT | Performed by: INTERNAL MEDICINE

## 2020-09-26 PROCEDURE — 83690 ASSAY OF LIPASE: CPT

## 2020-09-26 PROCEDURE — 85025 COMPLETE CBC W/AUTO DIFF WBC: CPT

## 2020-09-26 PROCEDURE — 74177 CT ABD & PELVIS W/CONTRAST: CPT

## 2020-09-26 PROCEDURE — 81001 URINALYSIS AUTO W/SCOPE: CPT

## 2020-09-26 PROCEDURE — 84484 ASSAY OF TROPONIN QUANT: CPT

## 2020-09-26 RX ORDER — KETOROLAC TROMETHAMINE 30 MG/ML
15 INJECTION, SOLUTION INTRAMUSCULAR; INTRAVENOUS ONCE
Status: COMPLETED | OUTPATIENT
Start: 2020-09-26 | End: 2020-09-26

## 2020-09-26 RX ORDER — OMEPRAZOLE 20 MG/1
20 CAPSULE, DELAYED RELEASE ORAL
Qty: 30 CAPSULE | Refills: 0 | Status: SHIPPED | OUTPATIENT
Start: 2020-09-26 | End: 2020-10-12 | Stop reason: SDUPTHER

## 2020-09-26 RX ADMIN — FAMOTIDINE 20 MG: 10 INJECTION INTRAVENOUS at 12:11

## 2020-09-26 RX ADMIN — KETOROLAC TROMETHAMINE 15 MG: 30 INJECTION, SOLUTION INTRAMUSCULAR at 12:10

## 2020-09-26 RX ADMIN — LIDOCAINE HYDROCHLORIDE: 20 SOLUTION ORAL; TOPICAL at 13:36

## 2020-09-26 RX ADMIN — IOPAMIDOL 75 ML: 755 INJECTION, SOLUTION INTRAVENOUS at 12:29

## 2020-09-26 ASSESSMENT — ENCOUNTER SYMPTOMS
ABDOMINAL DISTENTION: 0
EYE DISCHARGE: 0
RECTAL PAIN: 0
EYE REDNESS: 0
COUGH: 0
EYE PAIN: 0
RHINORRHEA: 0
SHORTNESS OF BREATH: 0
PHOTOPHOBIA: 0
VOMITING: 0
CHEST TIGHTNESS: 0
COLOR CHANGE: 0
CONSTIPATION: 0
VOICE CHANGE: 0
WHEEZING: 0
BLOOD IN STOOL: 0
ABDOMINAL PAIN: 1
NAUSEA: 1
SINUS PRESSURE: 0
APNEA: 0
DIARRHEA: 0
STRIDOR: 0
TROUBLE SWALLOWING: 0
BACK PAIN: 0
SORE THROAT: 0

## 2020-09-26 ASSESSMENT — PAIN DESCRIPTION - ONSET: ONSET: ON-GOING

## 2020-09-26 ASSESSMENT — PAIN DESCRIPTION - DESCRIPTORS: DESCRIPTORS: ACHING

## 2020-09-26 ASSESSMENT — PAIN SCALES - GENERAL
PAINLEVEL_OUTOF10: 6
PAINLEVEL_OUTOF10: 6

## 2020-09-26 ASSESSMENT — PAIN DESCRIPTION - PAIN TYPE: TYPE: ACUTE PAIN

## 2020-09-26 ASSESSMENT — PAIN DESCRIPTION - FREQUENCY: FREQUENCY: CONTINUOUS

## 2020-09-26 ASSESSMENT — PAIN DESCRIPTION - LOCATION: LOCATION: ABDOMEN;BACK

## 2020-09-26 ASSESSMENT — PAIN DESCRIPTION - ORIENTATION: ORIENTATION: MID;LOWER

## 2020-09-26 ASSESSMENT — PAIN DESCRIPTION - PROGRESSION: CLINICAL_PROGRESSION: GRADUALLY WORSENING

## 2020-09-26 NOTE — ED PROVIDER NOTES
Beata Beard is an 79year old female who presents with \"indigestion\" and RUQ pain intermittently since Thursday. She's had a decreased appetite, and feels \"full\" all the time. She has nausea with one episode of emesis, non-bloody and non-bilious. She has normal soft, form stools, most recently yesterday. She denies fever. She denies chest pain or SOB. She states her pain feels \"a little like pleurisy\" which she has had in the past. No cough or fever; no ill contacts reported. She has no known history of CAD, and has had no abdominal surgeries. BP (!) 148/72   Pulse 80   Resp 16   Ht 5' 2\" (1.575 m)   Wt 150 lb (68 kg)   SpO2 99%   BMI 27.44 kg/m²     I have reviewed the following from the nursing documentation:      Prior to Admission medications    Medication Sig Start Date End Date Taking? Authorizing Provider   fluticasone (FLONASE) 50 MCG/ACT nasal spray 1 spray by Each Nostril route 2 times daily 8/17/20  Yes Melany Alicea MD   albuterol sulfate HFA (VENTOLIN HFA) 108 (90 Base) MCG/ACT inhaler Inhale 2 puffs into the lungs every 4 hours as needed for Wheezing 7/6/20  Yes Carlos Ribera MD   fluticasone-salmeterol (ADVAIR) 100-50 MCG/DOSE diskus inhaler INHALE ONE PUFF BY MOUTH EVERY 12 HOURS 7/6/20  Yes Carlos Ribera MD   montelukast (SINGULAIR) 10 MG tablet Take 1 tablet by mouth nightly 7/6/20  Yes Carlos Ribera MD   Multiple Vitamins-Minerals (CENTRUM SILVER 50+WOMEN) TABS Take by mouth   Yes Historical Provider, MD   fluocinonide (LIDEX) 0.05 % external solution Apply sparingly to affected area(s) of scalp qd prn flares. 2/24/20   Alpa Zaman MD   Naproxen Sodium (ALEVE) 220 MG CAPS Take by mouth daily as needed for Pain    Historical Provider, MD   Cetirizine HCl (ALL DAY ALLERGY PO) Take  by mouth daily.     Historical Provider, MD       Allergies as of 09/26/2020 - Review Complete 09/26/2020   Allergen Reaction Noted    Cephalexin  07/31/2013    Oxycodone  04/03/2019  Erythromycin Nausea And Vomiting 09/24/2011       Past Medical History:   Diagnosis Date    Allergic rhinitis     Arthritis     Asthma     mild    GERD (gastroesophageal reflux disease)     Hematuria, microscopic 02/2018    Knee joint disorder 2018    left knee injection x3    Knee pain, right     PONV (postoperative nausea and vomiting)     Reflux     Seasonal allergies     Tinnitus         Surgical History:   Past Surgical History:   Procedure Laterality Date    CATARACT REMOVAL WITH IMPLANT Right 04/05/2019    CATARACT REMOVAL WITH IMPLANT Left 06/27/2019     [facility]O EMULSIFICATION OF CATARACT WITH INTRAOCULAR LENS IMPLANT EYE    COLONOSCOPY  2009    COLONOSCOPY  9/17/14    polyp    CYSTOSCOPY  02/2018    DILATION AND CURETTAGE OF UTERUS      FOOT SURGERY Bilateral [id number]    rt ft 3rd digit proximalinterphalangeal joint arthrodesis/lt  ft 5th/2nd proximal interphalangeal joint arthrodesis    INTRACAPSULAR CATARACT EXTRACTION Right 4/5/2019    PHACO EMULSIFICATION OF CATARACT WITH  INTRAOCULAR LENS IMPLANT RIGHT EYE performed by Megan Wilburn MD at [address] [facility] Left 6/27/2019    PHACO EMULSIFICATION OF CATARACT WITH INTRAOCULAR LENS IMPLANT EYE performed by Megan Wilburn MD at [address] [place] Right 06/19/2018    right total knee replacement    KNEE ARTHROSCOPY      TONSILLECTOMY      UPPER GASTROINTESTINAL ENDOSCOPY  3/15/13        Family History:    Family History   Problem Relation Age of Onset    Heart Disease Mother     Arthritis Mother     Cancer Father     Arthritis Father     Arthritis Sister     Rheum Arthritis Brother     Arthritis Brother     Asthma Brother     Asthma Other     Arthritis Brother     Arthritis Brother     Arthritis Brother     Arthritis Brother     Asthma Son     Seizures Sister     Seizures Sister        Social History     Socioeconomic History    Marital status:       Spouse name: Not on \"indigestion\" and RUQ) and nausea. Negative for abdominal distention, blood in stool, constipation, diarrhea, rectal pain and vomiting. Genitourinary: Negative for difficulty urinating, dyspareunia, dysuria, flank pain, frequency, genital sores, menstrual problem, pelvic pain, urgency, vaginal bleeding, vaginal discharge and vaginal pain. Musculoskeletal: Negative for arthralgias, back pain, joint swelling, neck pain and neck stiffness. Skin: Negative for color change and rash. Neurological: Negative for dizziness, tremors, seizures, syncope, facial asymmetry, speech difficulty, weakness, light-headedness, numbness and headaches. Hematological: Negative for adenopathy. Does not bruise/bleed easily. Psychiatric/Behavioral: Negative for agitation, confusion, dysphoric mood, hallucinations, self-injury, sleep disturbance and suicidal ideas. All other systems reviewed and are negative. Physical Exam  Constitutional:       General: She is not in acute distress. Appearance: She is well-developed. HENT:      Head: Normocephalic and atraumatic. Left Ear: External ear normal.      Mouth/Throat:      Pharynx: No oropharyngeal exudate. Eyes:      General:         Right eye: No discharge. Left eye: No discharge. Conjunctiva/sclera: Conjunctivae normal.      Pupils: Pupils are equal, round, and reactive to light. Neck:      Musculoskeletal: Normal range of motion. Vascular: No JVD. Trachea: No tracheal deviation. Cardiovascular:      Rate and Rhythm: Normal rate and regular rhythm. Heart sounds: Normal heart sounds. No murmur. No friction rub. No gallop. Pulmonary:      Effort: Pulmonary effort is normal. No respiratory distress. Breath sounds: Normal breath sounds. No stridor. No wheezing or rales. Chest:      Chest wall: No tenderness. Abdominal:      General: Bowel sounds are normal. There is no distension. Palpations: Abdomen is soft.  There is no mass. Tenderness: There is abdominal tenderness in the right upper quadrant. There is no guarding or rebound. Positive signs include McBurney's sign. Musculoskeletal: Normal range of motion. General: No tenderness. Lymphadenopathy:      Cervical: No cervical adenopathy. Skin:     Findings: No rash. Neurological:      Mental Status: She is alert and oriented to person, place, and time. Cranial Nerves: No cranial nerve deficit. Motor: No abnormal muscle tone. Coordination: Coordination normal.      Deep Tendon Reflexes: Reflexes normal.   Psychiatric:         Behavior: Behavior normal.         Thought Content:  Thought content normal.         Judgment: Judgment normal.          Procedures     MDM   Results for orders placed or performed during the hospital encounter of 09/26/20   CBC Auto Differential   Result Value Ref Range    WBC 6.9 4.0 - 11.0 K/uL    RBC 4.52 4.00 - 5.20 M/uL    Hemoglobin 12.1 12.0 - 16.0 g/dL    Hematocrit 36.8 36.0 - 48.0 %    MCV 81.4 80.0 - 100.0 fL    MCH 26.9 26.0 - 34.0 pg    MCHC 33.0 31.0 - 36.0 g/dL    RDW 14.2 12.4 - 15.4 %    Platelets 495 615 - 316 K/uL    MPV 8.1 5.0 - 10.5 fL    Neutrophils % 58.5 %    Lymphocytes % 24.8 %    Monocytes % 11.6 %    Eosinophils % 4.2 %    Basophils % 0.9 %    Neutrophils Absolute 4.1 1.7 - 7.7 K/uL    Lymphocytes Absolute 1.7 1.0 - 5.1 K/uL    Monocytes Absolute 0.8 0.0 - 1.3 K/uL    Eosinophils Absolute 0.3 0.0 - 0.6 K/uL    Basophils Absolute 0.1 0.0 - 0.2 K/uL   Urinalysis Reflex to Culture    Specimen: Urine, clean catch   Result Value Ref Range    Color, UA Yellow Straw/Yellow    Clarity, UA Clear Clear    Glucose, Ur Negative Negative mg/dL    Bilirubin Urine Negative Negative    Ketones, Urine Negative Negative mg/dL    Specific Gravity, UA 1.010 1.005 - 1.030    Blood, Urine TRACE-INTACT (A) Negative    pH, UA 6.5 5.0 - 8.0    Protein, UA Negative Negative mg/dL    Urobilinogen, Urine 0.2 <2.0 E.U./dL Nitrite, Urine Negative Negative    Leukocyte Esterase, Urine Negative Negative    Microscopic Examination YES     Urine Type NotGiven     Urine Reflex to Culture Not Indicated    Lipase   Result Value Ref Range    Lipase 31.0 13.0 - 60.0 U/L   Troponin   Result Value Ref Range    Troponin <0.01 <0.01 ng/mL   Basic Metabolic Panel   Result Value Ref Range    Sodium 139 136 - 145 mmol/L    Potassium 4.3 3.5 - 5.1 mmol/L    Chloride 103 99 - 110 mmol/L    CO2 27 21 - 32 mmol/L    Anion Gap 9 3 - 16    Glucose 81 70 - 99 mg/dL    BUN 16 7 - 20 mg/dL    CREATININE <0.5 (L) 0.6 - 1.2 mg/dL    GFR Non-African American >60 >60    GFR African American >60 >60    Calcium 9.1 8.3 - 10.6 mg/dL   Microscopic Urinalysis   Result Value Ref Range    WBC, UA None seen 0 - 5 /HPF    RBC, UA 5-10 (A) 0 - 4 /HPF    Epithelial Cells, UA 2-5 0 - 5 /HPF   EKG 12 Lead   Result Value Ref Range    Ventricular Rate 69 BPM    Atrial Rate 69 BPM    P-R Interval 146 ms    QRS Duration 92 ms    Q-T Interval 402 ms    QTc Calculation (Bazett) 430 ms    P Axis 11 degrees    R Axis -25 degrees    T Axis 6 degrees    Diagnosis       Normal sinus rhythmNormal ECGNo previous ECGs available         I estimate there is LOW risk for ACUTE APPENDICITIS, BOWEL OBSTRUCTION, CHOLECYSTITIS, DIVERTICULITIS, INCARCERATED HERNIA, PANCREATITIS, or PERFORATED BOWEL or ULCER, thus I consider the discharge disposition reasonable. Also, there is no evidence or peritonitis, sepsis, or toxicity. Ori Duran and I have discussed the diagnosis and risks, and we agree with discharging home to follow-up with their primary doctor. We also discussed returning to the Emergency Department immediately if new or worsening symptoms occur. We have discussed the symptoms which are most concerning (e.g., bloody stool, fever, changing or worsening pain, vomiting) that necessitate immediate return. FINAL Impression    1. Pain of upper abdomen    2. Hernia, hiatal    3.  Acute right-sided low back pain without sciatica        Blood pressure 132/63, pulse 80, resp. rate 16, height 5' 2\" (1.575 m), weight 150 lb (68 kg), SpO2 99 %, not currently breastfeeding. Radiology  Xr Chest (2 Vw)    Result Date: 9/26/2020  No acute cardiopulmonary disease. Moderate hiatal hernia. Ct Abdomen Pelvis W Iv Contrast Additional Contrast? None    Result Date: 9/26/2020  Moderate size hiatal hernia, increased in size compared to 09/24/2011 Large stool load. There is diverticulosis. No diverticulitis. Normal caliber appendix Possible thickening of the endometrial stripe versus a focal fibroid. Recommend follow-up non urgent pelvic ultrasound for further characterization to rule out endometrial stripe thickening       EKG Interpretation. The Ekg interpreted by me in the absence of a cardiologist shows. normal sinus rhythm with a rate of 69  Axis is   Normal  QTc is  within an acceptable range  Intervals and Durations are unremarkable. No specific ST-T wave changes appreciated. No evidence of acute ischemia.    No significant change from prior EKG dated October 15, 2019         Myron Holland MD  09/26/20 [id number]

## 2020-09-26 NOTE — ED NOTES
--Patient provided with discharge instructions and any prescriptions. --Instructions, dosing, and follow-up appointments reviewed with patient/family. No further questions or needs at this time. --Vital signs and patient stable upon discharge. --Patient ambulatory to teri.        Lg Weldon RN  09/26/20 [id number]

## 2020-09-26 NOTE — ED PROVIDER NOTES
Pt seen and evaluated under supervision from Dr. Francisco Reagan  Abdominal Pain (Patient reports abdominal/back  pain that started on Thursday while at work. Patient reports nausea that began on Friday. Patient reports that \"felt like someone punched her in stomach\" )      HISTORY OF PRESENT ILLNESS  Lakia Lan is a 79 y.o. female with a history of GERD, allergic rhinitis asthma who presents to the ED complaining of pain located in the right upper quadrant, and back pain. She states the pain started on Thursday at work. She states the pain is always there, no pain with movement, or with food. She describes the pain as a burning sensation. She states she came to the ED today because she woke up and the pain was worse. No other complaints, modifying factors or associated symptoms. I have reviewed the following from the nursing documentation.     Past Medical History:   Diagnosis Date    Allergic rhinitis     Arthritis     Asthma     mild    GERD (gastroesophageal reflux disease)     Hematuria, microscopic 02/2018    Knee joint disorder 2018    left knee injection x3    Knee pain, right     PONV (postoperative nausea and vomiting)     Reflux     Seasonal allergies     Tinnitus      Past Surgical History:   Procedure Laterality Date    CATARACT REMOVAL WITH IMPLANT Right 04/05/2019    CATARACT REMOVAL WITH IMPLANT Left 06/27/2019     PHACO EMULSIFICATION OF CATARACT WITH INTRAOCULAR LENS IMPLANT EYE    COLONOSCOPY  2009    COLONOSCOPY  9/17/14    polyp    CYSTOSCOPY  02/2018    DILATION AND CURETTAGE OF UTERUS      FOOT SURGERY Bilateral [id number]    rt ft 3rd digit proximalinterphalangeal joint arthrodesis/lt  ft 5th/2nd proximal interphalangeal joint arthrodesis    INTRACAPSULAR CATARACT EXTRACTION Right 4/5/2019    PHACO EMULSIFICATION OF CATARACT WITH  INTRAOCULAR LENS IMPLANT RIGHT EYE performed by Amilcar Petit MD at [facility] Left 6/27/2019    PHACO EMULSIFICATION OF CATARACT WITH INTRAOCULAR LENS IMPLANT EYE performed by Whitney Mosqueda MD at [address] [place] [address] Right 06/19/2018    right total knee replacement    KNEE ARTHROSCOPY      TONSILLECTOMY      UPPER GASTROINTESTINAL ENDOSCOPY  3/15/13     Family History   Problem Relation Age of Onset    Heart Disease Mother     Arthritis Mother     Cancer Father     Arthritis Father     Arthritis Sister     Rheum Arthritis Brother     Arthritis Brother     Asthma Brother     Asthma Other     Arthritis Brother     Arthritis Brother     Arthritis Brother     Arthritis Brother     Asthma Son     Seizures Sister     Seizures Sister      Social History     Socioeconomic History    Marital status:      Spouse name: Not on file    Number of children: 3    Years of education: 25    Highest education level: Not on file   Occupational History    Occupation: therapist     Comment: [employer]   Social Needs    Financial resource strain: Not on file    Food insecurity     Worry: Not on file     Inability: Not on file   [employer] needs     Medical: Not on file     Non-medical: Not on file   Tobacco Use    Smoking status: Never Smoker    Smokeless tobacco: Never Used   Substance and Sexual Activity    Alcohol use:  Yes     Alcohol/week: 0.0 standard drinks     Comment: socially     Drug use: No    Sexual activity: Never   Lifestyle    Physical activity     Days per week: Not on file     Minutes per session: Not on file    Stress: Not on file   Relationships    Social connections     Talks on phone: Not on file     Gets together: Not on file     Attends Baptism service: Not on file     Active member of club or organization: Not on file     Attends meetings of clubs or organizations: Not on file     Relationship status: Not on file    Intimate partner violence     Fear of current or ex partner: Not on file     Emotionally abused: Not on file Physically abused: Not on file     Forced sexual activity: Not on file   Other Topics Concern    Not on file   Social History Narrative    Not on file     No current facility-administered medications for this encounter. Current Outpatient Medications   Medication Sig Dispense Refill    fluticasone (FLONASE) 50 MCG/ACT nasal spray 1 spray by Each Nostril route 2 times daily 1 Bottle 2    albuterol sulfate HFA (VENTOLIN HFA) 108 (90 Base) MCG/ACT inhaler Inhale 2 puffs into the lungs every 4 hours as needed for Wheezing 1 Inhaler 5    fluticasone-salmeterol (ADVAIR) 100-50 MCG/DOSE diskus inhaler INHALE ONE PUFF BY MOUTH EVERY 12 HOURS 60 each 1    montelukast (SINGULAIR) 10 MG tablet Take 1 tablet by mouth nightly 30 tablet 5    Multiple Vitamins-Minerals (CENTRUM SILVER 50+WOMEN) TABS Take by mouth      fluocinonide (LIDEX) 0.05 % external solution Apply sparingly to affected area(s) of scalp qd prn flares. 60 mL 2    Naproxen Sodium (ALEVE) 220 MG CAPS Take by mouth daily as needed for Pain      Cetirizine HCl (ALL DAY ALLERGY PO) Take  by mouth daily. Allergies   Allergen Reactions    Cephalexin      Vomiting    Oxycodone     Erythromycin Nausea And Vomiting       REVIEW OF SYSTEMS  10 systems reviewed, pertinent positives per HPI otherwise noted to be negative. PHYSICAL EXAM  BP (!) 148/72   Pulse 80   Resp 16   Ht 5' 2\" (1.575 m)   Wt 150 lb (68 kg)   SpO2 99%   BMI 27.44 kg/m²   GENERAL APPEARANCE: Awake and alert. Cooperative. No acute distress. HEAD: Normocephalic. Atraumatic. EYES: PERRL. EOM's grossly intact. ENT: Mucous membranes are moist.   NECK: Supple, trachea midline. HEART: RRR. Normal S1S2, no rubs, gallops, or murmurs noted  LUNGS: Respirations unlabored. CTAB. Good air exchange. No wheezes, rales, or rhonchi. Speaking comfortably in full sentences. ABDOMEN: Soft. Non-distended. Non-tender. No guarding or rebound. Normal bowel sounds.   EXTREMITIES: No peripheral edema. MAEE. No acute deformities. SKIN: Warm and dry. No acute rashes. NEUROLOGICAL: Alert and oriented X 3. CN II-XII intact. No gross facial drooping. Strength 5/5, sensation intact. Normal coordination. No pronator drift. Gait normal.   PSYCHIATRIC: Normal mood and affect. LABS  I have reviewed all labs for this visit. No results found for this visit on 09/26/20. RADIOLOGY  X-RAYS:  I have reviewed radiologic plain film image(s). ALL OTHER NON-PLAIN FILM IMAGES SUCH AS CT, ULTRASOUND AND MRI HAVE BEEN READ BY THE RADIOLOGIST. CT ABDOMEN PELVIS W IV CONTRAST Additional Contrast? None   Final Result   Moderate size hiatal hernia, increased in size compared to 09/24/2011      Large stool load. There is diverticulosis. No diverticulitis. Normal caliber appendix      Possible thickening of the endometrial stripe versus a focal fibroid. Recommend follow-up non urgent pelvic ultrasound for further characterization   to rule out endometrial stripe thickening         XR CHEST (2 VW)   Final Result   No acute cardiopulmonary disease. Moderate hiatal hernia. The Ekg interpreted by me in the absence of a cardiologist shows. normal sinus rhythm with a rate of 69  Axis is   Normal  QTc is  normal  Intervals and Durations are unremarkable. No specific ST-T wave changes appreciated. No evidence of acute ischemia. No significant change from prior EKG dated 4/01/2019    ED COURSE/MDM  Patient seen and evaluated. Old records reviewed. Labs and imaging reviewed and results discussed with patient. No abnormalities were noted on the CBC ,CMP, UA, or lipase. CT abdomen pelvis showed moderate sized hiatal hernia and possible thickening of the endometrial stripe versus fibroids. Patient was given a GI cocktail in the ED with good symptomatic relief. Patient was reassessed as noted above .  No acute pathology was noted and pt is safe for discharge home to follow up with PCP concerning follow-up with CT findings. Plan of care discussed with patient and family. Patient and family in agreement with plan. Patient was given scripts for the following medications. I counseled patient how to take these medications. Discharge Medication List as of 9/26/2020  1:59 PM      START taking these medications    Details   omeprazole (PRILOSEC) 20 MG delayed release capsule Take 1 capsule by mouth every morning (before breakfast), Disp-30 capsule,R-0Print             CLINICAL IMPRESSION  1. Pain of upper abdomen    2. Hernia, hiatal    3. Acute right-sided low back pain without sciatica        Blood pressure (!) 148/72, pulse 80, resp. rate 16, height 5' 2\" (1.575 m), weight 150 lb (68 kg), SpO2 99 %, not currently breastfeeding. DISPOSITION  Marc Georges was discharged to home in stable condition.         Levar Whitlock DO  Resident  09/26/20 1500

## 2020-09-26 NOTE — TELEPHONE ENCOUNTER
Reason for Disposition   [1] Pain lasts > 10 minutes AND [2] age > 48    Answer Assessment - Initial Assessment Questions  1. LOCATION: \"Where does it hurt? \"       Right upper abdominal area  2. RADIATION: \"Does the pain shoot anywhere else? \" (e.g., chest, back)      Radiates to back  3. ONSET: \"When did the pain begin? \" (e.g., minutes, hours or days ago)       Three days ago  4. SUDDEN: \"Gradual or sudden onset? \"      gradual  5. PATTERN \"Does the pain come and go, or is it constant? \"     - If constant: \"Is it getting better, staying the same, or worsening? \"       (Note: Constant means the pain never goes away completely; most serious pain is constant and it progresses)      - If intermittent: \"How long does it last?\" \"Do you have pain now? \"      (Note: Intermittent means the pain goes away completely between bouts)      constant  6. SEVERITY: \"How bad is the pain? \"  (e.g., Scale 1-10; mild, moderate, or severe)     - MILD (1-3): doesn't interfere with normal activities, abdomen soft and not tender to touch      - MODERATE (4-7): interferes with normal activities or awakens from sleep, tender to touch      - SEVERE (8-10): excruciating pain, doubled over, unable to do any normal activities        6/10  7. RECURRENT SYMPTOM: \"Have you ever had this type of abdominal pain before? \" If so, ask: \"When was the last time? \" and \"What happened that time? \"       no  8. AGGRAVATING FACTORS: \"Does anything seem to cause this pain? \" (e.g., foods, stress, alcohol)      Not sure  9. CARDIAC SYMPTOMS: \"Do you have any of the following symptoms: chest pain, difficulty breathing, sweating, nausea? \"     nausea  10. OTHER SYMPTOMS: \"Do you have any other symptoms? \" (e.g., fever, vomiting, diarrhea)        Upper right back pain and nausea  11. PREGNANCY: \"Is there any chance you are pregnant? \" \"When was your last menstrual period? \"        no    Protocols used: ABDOMINAL PAIN - UPPER-ADULT-    POD 1 Busy  Nausea   Constant

## 2020-09-28 ENCOUNTER — VIRTUAL VISIT (OUTPATIENT)
Dept: INTERNAL MEDICINE CLINIC | Age: 80
End: 2020-09-28
Payer: MEDICARE

## 2020-09-28 ENCOUNTER — CARE COORDINATION (OUTPATIENT)
Dept: FAMILY MEDICINE CLINIC | Age: 80
End: 2020-09-28

## 2020-09-28 PROBLEM — K44.9 HIATAL HERNIA: Status: ACTIVE | Noted: 2020-09-28

## 2020-09-28 PROCEDURE — G0439 PPPS, SUBSEQ VISIT: HCPCS | Performed by: INTERNAL MEDICINE

## 2020-09-28 PROCEDURE — 99204 OFFICE O/P NEW MOD 45 MIN: CPT | Performed by: INTERNAL MEDICINE

## 2020-09-28 ASSESSMENT — LIFESTYLE VARIABLES
HOW OFTEN DURING THE LAST YEAR HAVE YOU FAILED TO DO WHAT WAS NORMALLY EXPECTED FROM YOU BECAUSE OF DRINKING: 0
HOW OFTEN DURING THE LAST YEAR HAVE YOU HAD A FEELING OF GUILT OR REMORSE AFTER DRINKING: 0
HOW OFTEN DO YOU HAVE SIX OR MORE DRINKS ON ONE OCCASION: 0
AUDIT-C TOTAL SCORE: 1
AUDIT TOTAL SCORE: 1
HAVE YOU OR SOMEONE ELSE BEEN INJURED AS A RESULT OF YOUR DRINKING: 0
HOW OFTEN DURING THE LAST YEAR HAVE YOU FOUND THAT YOU WERE NOT ABLE TO STOP DRINKING ONCE YOU HAD STARTED: 0
HOW OFTEN DO YOU HAVE A DRINK CONTAINING ALCOHOL: 1
HOW OFTEN DURING THE LAST YEAR HAVE YOU NEEDED AN ALCOHOLIC DRINK FIRST THING IN THE MORNING TO GET YOURSELF GOING AFTER A NIGHT OF HEAVY DRINKING: 0
HOW MANY STANDARD DRINKS CONTAINING ALCOHOL DO YOU HAVE ON A TYPICAL DAY: 0
HAS A RELATIVE, FRIEND, DOCTOR, OR ANOTHER HEALTH PROFESSIONAL EXPRESSED CONCERN ABOUT YOUR DRINKING OR SUGGESTED YOU CUT DOWN: 0
HOW OFTEN DURING THE LAST YEAR HAVE YOU BEEN UNABLE TO REMEMBER WHAT HAPPENED THE NIGHT BEFORE BECAUSE YOU HAD BEEN DRINKING: 0

## 2020-09-28 NOTE — PROGRESS NOTES
Date of Service:  9/28/2020    Chief Complaint:      Chief Complaint   Patient presents with   [facility] ED Follow-up     was diagnosed with hiatal hernia     Medicare AWV       HPI:  Elijah Roland is a 79 y.o. Pursuant to the emergency declaration under the [facility] waiver authority and the [employer] Act, this Virtual  Video Visit was conducted, with patient's consent, to reduce the patient's risk of exposure to COVID-19 and provide continuity of care. Service is  provided through a video synchronous discussion virtually to substitute for in-person clinic visit with the patient being at home and Dr. Evonne Luna being at home. She went to the ER because she had low back pain, nausea and abd pain 2 days ago. CT abd showed moderate hiatal hernia and large stool load. She has been having daily BM and not feel constipated. Prilosec was started and pain has decreased. Mild Asthma:  Stable on Advair 100/50 qd, singulair 10 mg qd and ventolin prn  Allergies:  Stable on Flonase 1 spray qd.     Lab Results   Component Value Date    LABA1C 5.5 07/06/2020    LABA1C 5.5 12/13/2019    LABA1C 5.8 04/01/2019    LABMICR YES 09/26/2020     Lab Results   Component Value Date     09/26/2020    K 4.3 09/26/2020     09/26/2020    CO2 27 09/26/2020    BUN 16 09/26/2020    CREATININE <0.5 (L) 09/26/2020    GLUCOSE 81 09/26/2020    CALCIUM 9.1 09/26/2020     Lab Results   Component Value Date    CHOL 202 07/06/2020    TRIG 125 07/06/2020    HDL 69 07/06/2020    LDLCALC 108 07/06/2020     Lab Results   Component Value Date    ALT 12 06/17/2019    AST 18 06/17/2019     Lab Results   Component Value Date    TSH 1.19 09/17/2015    T4FREE 1.1 09/17/2015     Lab Results   Component Value Date    WBC 6.9 09/26/2020    HGB 12.1 09/26/2020    HCT 36.8 09/26/2020    MCV 81.4 09/26/2020     09/26/2020     No results found for: INR   No results found for: PSA   No results found for: [facility]     Patient Active Problem List   Diagnosis    Environmental and seasonal allergies    Mild persistent asthma without complication    Recurrent UTI    Hematuria    Numbness and tingling in right hand    Hammer toe of right foot    Arthritis of right knee    Status post total left knee replacement    Hiatal hernia       Allergies   Allergen Reactions    Cephalexin      Vomiting    Oxycodone     Erythromycin Nausea And Vomiting     Outpatient Medications Marked as Taking for the 9/28/20 encounter (Virtual Visit) with Akil Rosa MD   Medication Sig Dispense Refill    omeprazole (PRILOSEC) 20 MG delayed release capsule Take 1 capsule by mouth every morning (before breakfast) 30 capsule 0    Cetirizine HCl (ALL DAY ALLERGY PO) Take  by mouth daily. Review of Systems: 14 systems were negative except of what was stated on HPI    Nursing note and vitals reviewed. There were no vitals filed for this visit. Wt Readings from Last 3 Encounters:   09/26/20 150 lb (68 kg)   08/17/20 152 lb 9.6 oz (69.2 kg)   07/06/20 149 lb 3.2 oz (67.7 kg)     BP Readings from Last 3 Encounters:   09/26/20 (!) 145/69   08/17/20 92/68   07/06/20 126/86     There is no height or weight on file to calculate BMI. Constitutional: Patient appears well-developed and well-nourished. No distress. Head: Normocephalic and atraumatic. Skin: No rash or erythema. Psychiatric: Normal mood and affect. Behavior is normal.       Assessment/Plan:  Aydee Moreno was seen today for establish care, ed follow-up and medicare awv.     Diagnoses and all orders for this visit:    Routine general medical examination at a health care facility    Generalized abdominal pain  Continue Prilosec for a little longer    Hiatal hernia  Monitor for now    Large stool  Take some Colace for a few days to reduce stool in colon    Environmental and seasonal allergies  Stable and continue on current medications. Mild persistent asthma without complication  Stable and continue on current medications. Return Oct 12 at 11:20 office Abd pain.

## 2020-09-28 NOTE — TELEPHONE ENCOUNTER
9/28 The patient called back stating she ended up in the Hospital this weekend and her message was not addressed clearly. The patient stated she called [facility] and they advised her to go to the Hospital and have her symptoms evaluated and that's when the hospital found a Hiatal Hernia. The patient is very upset that she has to repeat herself over and over again. The patient is also confused where she can go or who she can see. The patient also stated clearly she did not want to see the NP who said to monitor her symptoms. I advised her she could go to [address] [place] [address] or [address] [place] [address] and see a MD and they would follow up with her for her hiatal hernia. The patient then said she's so angry and upset she no longer wants to talk anymore so she hung up.

## 2020-09-28 NOTE — CARE COORDINATION
Placed call to patient to follow up on recent ED visit and assess CC needs. No message left d/t HIPPA release instructios. Noted patient has appt. With new PCP this afternoon. Plan:   Will attempt again tomorrow    Fito Oneil RN, MSN  Ambulatory Care Manager  [phone]

## 2020-09-28 NOTE — PROGRESS NOTES
Medicare Annual Wellness Visit    Name: Mandy Woods Date: 2020   MRN: [id number] Sex: Female   Age: 79 y.o. Ethnicity: Non-/Non    : 1940 Race: Michele Manrique is here for Establish Care; ED Follow-up (was diagnosed with hiatal hernia ); and Medicare AWV    Screenings for behavioral, psychosocial and functional/safety risks, and cognitive dysfunction are all negative except as indicated below. These results, as well as other patient data from the [address] form, are documented in Flowsheets linked to this Encounter. Allergies   Allergen Reactions    Cephalexin      Vomiting    Oxycodone     Erythromycin Nausea And Vomiting       Prior to Visit Medications    Medication Sig Taking? Authorizing Provider   omeprazole (PRILOSEC) 20 MG delayed release capsule Take 1 capsule by mouth every morning (before breakfast) Yes Jitendra Dunbar DO   Cetirizine HCl (ALL DAY ALLERGY PO) Take  by mouth daily. Yes Historical Provider, MD   fluticasone (FLONASE) 50 MCG/ACT nasal spray 1 spray by Each Nostril route 2 times daily  Patient not taking: Reported on 2020  Kemal Medellin MD   albuterol sulfate HFA (VENTOLIN HFA) 108 (90 Base) MCG/ACT inhaler Inhale 2 puffs into the lungs every 4 hours as needed for Wheezing  Patient not taking: Reported on 2020  Autumn Davis MD   fluticasone-salmeterol (ADVAIR) 100-50 MCG/DOSE diskus inhaler INHALE ONE PUFF BY MOUTH EVERY 12 HOURS  Patient not taking: Reported on 2020  Autumn Davis MD   montelukast (SINGULAIR) 10 MG tablet Take 1 tablet by mouth nightly  Patient not taking: Reported on 2020  Autumn Davis MD   fluocinonide (LIDEX) 0.05 % external solution Apply sparingly to affected area(s) of scalp qd prn flares.   Patient not taking: Reported on 2020  Jim Cobos MD   Naproxen Sodium (ALEVE) 220 MG CAPS Take by mouth daily as needed for Pain  Historical Provider, MD   Multiple Vitamins-Minerals (CENTRUM SILVER 50+WOMEN) TABS Take by mouth  Historical Provider, MD       Past Medical History:   Diagnosis Date    Allergic rhinitis     Arthritis     Asthma     mild    GERD (gastroesophageal reflux disease)     Hematuria, microscopic 02/2018    Knee joint disorder 2018    left knee injection x3    Knee pain, right     PONV (postoperative nausea and vomiting)     Reflux     Seasonal allergies     Tinnitus        Past Surgical History:   Procedure Laterality Date    CATARACT REMOVAL WITH IMPLANT Right 04/05/2019    CATARACT REMOVAL WITH IMPLANT Left 06/27/2019     PHACO EMULSIFICATION OF CATARACT WITH INTRAOCULAR LENS IMPLANT EYE    COLONOSCOPY  2009    COLONOSCOPY  9/17/14    polyp    CYSTOSCOPY  02/2018    DILATION AND CURETTAGE OF UTERUS      FOOT SURGERY Bilateral [id number]    rt ft 3rd digit proximalinterphalangeal joint arthrodesis/lt  ft 5th/2nd proximal interphalangeal joint arthrodesis    INTRACAPSULAR CATARACT EXTRACTION Right 4/5/2019    PHACO EMULSIFICATION OF CATARACT WITH  INTRAOCULAR LENS IMPLANT RIGHT EYE performed by Castillo Hernandez MD at [address] [place] [address] Left 6/27/2019    PHACO EMULSIFICATION OF CATARACT WITH INTRAOCULAR LENS IMPLANT EYE performed by Castillo Hernandez MD at [facility] Right 06/19/2018    right total knee replacement    KNEE ARTHROSCOPY      TONSILLECTOMY      UPPER GASTROINTESTINAL ENDOSCOPY  3/15/13       Family History   Problem Relation Age of Onset    Heart Disease Mother     Arthritis Mother     Cancer Father     Arthritis Father     Arthritis Sister     Rheum Arthritis Brother     Arthritis Brother     Asthma Brother     Asthma Other     Arthritis Brother     Arthritis Brother     Arthritis Brother     Arthritis Brother     Asthma Son     Seizures Sister     Seizures Sister        CareTeam (Including outside providers/suppliers regularly involved in providing care): Patient Care Team:  NARESH Saldana CNP as PCP - General  NARESH Saldana CNP as PCP - [facility] Empaneled Provider  Farrah Hale MD (Orthopedic Surgery)  Melchor Zambrano DPM as Consulting Physician (Podiatry)  Glenn Cooper as Physician Assistant (Orthopedic Surgery)    Wt Readings from Last 3 Encounters:   09/26/20 150 lb (68 kg)   08/17/20 152 lb 9.6 oz (69.2 kg)   07/06/20 149 lb 3.2 oz (67.7 kg)     Patient-Reported Vitals 9/28/2020   Patient-Reported Weight 146 lb   Patient-Reported Height 62 in      There is no height or weight on file to calculate BMI. Based upon direct observation of the patient, evaluation of cognition reveals recent and remote memory intact. Patient's complete Health Risk Assessment and screening values have been reviewed and are found in Flowsheets. The following problems were reviewed today and where indicated follow up appointments were made and/or referrals ordered. Positive Risk Factor Screenings with Interventions:     Health Habits/Nutrition:  Health Habits/Nutrition  Do you exercise for at least 20 minutes 2-3 times per week?: (!) No(says she is out of habit and too tired)  Have you lost any weight without trying in the past 3 months?: No  Do you eat fewer than 2 meals per day?: No  Have you seen a dentist within the past year?: Yes  There is no height or weight on file to calculate BMI. Health Habits/Nutrition Interventions:  ·     ADL:  ADLs  In the past 7 days, did you need help from others to perform any of the following everyday activities? Eating, dressing, grooming, bathing, toileting, or walking/balance?: None  In the past 7 days, did you need help from others to take care of any of the following?  Laundry, housekeeping, banking/finances, shopping, telephone use, food preparation, transportation, or taking medications?: (!) Housekeeping  ADL Interventions:  · son lives downstairs from her    Personalized Preventive Plan   Current Health Maintenance Status  Immunization History   Administered Date(s) Administered    Influenza Virus Vaccine 10/23/2016    Influenza, High Dose (Fluzone 65 yrs and older) 10/30/2017, 09/10/2018    Influenza, Triv, inactivated, subunit, adjuvanted, IM (Fluad 65 yrs and older) 09/13/2019    Pneumococcal Conjugate 13-valent ([id number]) 11/11/2015    Pneumococcal Polysaccharide ([id number]) 08/21/2017, 01/22/2018    Tdap (Boostrix, Adacel) 06/12/2014    Zoster Live (Zostavax) 11/04/2012        Health Maintenance   Topic Date Due    Annual Wellness Visit (AWV)  05/29/2019    Shingles Vaccine (3 of 3) 11/07/2020    DTaP/Tdap/Td vaccine (2 - Td) 06/12/2024    Colon cancer screen colonoscopy  09/17/2024    DEXA (modify frequency per FRAX score)  Completed    Flu vaccine  Completed    Pneumococcal 65+ years Vaccine  Completed    Hepatitis A vaccine  Aged Out    Hepatitis B vaccine  Aged Out    Hib vaccine  Aged Out    Meningococcal (ACWY) vaccine  Aged Out     Recommendations for [employer] Due: see orders and patient instructions/AVS.  . Recommended screening schedule for the next 5-10 years is provided to the patient in written form: see Patient Instructions/AVS.    There are no diagnoses linked to this encounter. Andriy Moore is a 79 y.o. female being evaluated by a Virtual Visit (video and audio) encounter to address concerns as mentioned above. A caregiver was present when appropriate. Due to this being a TeleHealth encounter (During JUREV-03 public health emergency), evaluation of the following organ systems was limited: Vitals/Constitutional/EENT/Resp/CV/GI//MS/Neuro/Skin/Heme-Lymph-Imm.   Pursuant to the emergency declaration under the [facility] waiver authority and the [employer] Act, this Virtual Visit was conducted with patient's (and/or legal guardian's) consent, to reduce

## 2020-10-12 ENCOUNTER — OFFICE VISIT (OUTPATIENT)
Dept: INTERNAL MEDICINE CLINIC | Age: 80
End: 2020-10-12
Payer: MEDICARE

## 2020-10-12 VITALS
BODY MASS INDEX: 28.71 KG/M2 | DIASTOLIC BLOOD PRESSURE: 66 MMHG | HEIGHT: 62 IN | HEART RATE: 72 BPM | WEIGHT: 156 LBS | OXYGEN SATURATION: 98 % | TEMPERATURE: 97.5 F | SYSTOLIC BLOOD PRESSURE: 128 MMHG

## 2020-10-12 PROBLEM — K21.9 GASTROESOPHAGEAL REFLUX DISEASE WITHOUT ESOPHAGITIS: Status: ACTIVE | Noted: 2020-10-12

## 2020-10-12 PROCEDURE — 99214 OFFICE O/P EST MOD 30 MIN: CPT | Performed by: INTERNAL MEDICINE

## 2020-10-12 RX ORDER — OMEPRAZOLE 20 MG/1
20 CAPSULE, DELAYED RELEASE ORAL
Qty: 30 CAPSULE | Refills: 5 | Status: SHIPPED | OUTPATIENT
Start: 2020-10-12 | End: 2021-10-04 | Stop reason: SDUPTHER

## 2020-10-12 NOTE — PROGRESS NOTES
Sintia Sharma  YOB: 1940    Date of Service:  10/12/2020    Chief Complaint:      Chief Complaint   Patient presents with    Abdominal Pain     states pain has resolved on new medication        HPI:  Sintia Sharma is a 79 y.o. GERD:  resolve on Prilosec 20 mg qd. Hiatal hernia:  Stable and asymptomatic. Pt agree to seeing specialist at this time. Mild Asthma:  Stable on Advair 100/50 bid, singulair 10 mg qd and ventolin prn  Allergies:  Stable on Zyrtec 10 mg qd and Flonase 1 spray qd.     Lab Results   Component Value Date    LABA1C 5.5 07/06/2020    LABA1C 5.5 12/13/2019    LABA1C 5.8 04/01/2019    LABMICR YES 09/26/2020     Lab Results   Component Value Date     09/26/2020    K 4.3 09/26/2020     09/26/2020    CO2 27 09/26/2020    BUN 16 09/26/2020    CREATININE <0.5 (L) 09/26/2020    GLUCOSE 81 09/26/2020    CALCIUM 9.1 09/26/2020     Lab Results   Component Value Date    CHOL 202 07/06/2020    TRIG 125 07/06/2020    HDL 69 07/06/2020    LDLCALC 108 07/06/2020     Lab Results   Component Value Date    ALT 12 06/17/2019    AST 18 06/17/2019     Lab Results   Component Value Date    TSH 1.19 09/17/2015    T4FREE 1.1 09/17/2015     Lab Results   Component Value Date    WBC 6.9 09/26/2020    HGB 12.1 09/26/2020    HCT 36.8 09/26/2020    MCV 81.4 09/26/2020     09/26/2020     No results found for: INR   No results found for: PSA   No results found for: LABURIC     Patient Active Problem List   Diagnosis    Environmental and seasonal allergies    Mild persistent asthma without complication    Recurrent UTI    Hematuria    Numbness and tingling in right hand    Hammer toe of right foot    Arthritis of right knee    Status post total left knee replacement    Hiatal hernia       Allergies   Allergen Reactions    Cephalexin      Vomiting    Oxycodone     Erythromycin Nausea And Vomiting     Outpatient Medications Marked as Taking for the 10/12/20 encounter (Office Visit) with Qing Rosa MD   Medication Sig Dispense Refill    omeprazole (PRILOSEC) 20 MG delayed release capsule Take 1 capsule by mouth every morning (before breakfast) 30 capsule 0    fluticasone (FLONASE) 50 MCG/ACT nasal spray 1 spray by Each Nostril route 2 times daily 1 Bottle 2    albuterol sulfate HFA (VENTOLIN HFA) 108 (90 Base) MCG/ACT inhaler Inhale 2 puffs into the lungs every 4 hours as needed for Wheezing 1 Inhaler 5    fluticasone-salmeterol (ADVAIR) 100-50 MCG/DOSE diskus inhaler INHALE ONE PUFF BY MOUTH EVERY 12 HOURS 60 each 1    montelukast (SINGULAIR) 10 MG tablet Take 1 tablet by mouth nightly 30 tablet 5    fluocinonide (LIDEX) 0.05 % external solution Apply sparingly to affected area(s) of scalp qd prn flares. 60 mL 2    Naproxen Sodium (ALEVE) 220 MG CAPS Take by mouth daily as needed for Pain      Multiple Vitamins-Minerals (CENTRUM SILVER 50+WOMEN) TABS Take by mouth      Cetirizine HCl (ALL DAY ALLERGY PO) Take  by mouth daily. Review of Systems: 14 systems were negative except of what was stated on HPI    Nursing note and vitals reviewed. Vitals:    10/12/20 1109   BP: 128/66   Pulse: 72   Temp: 97.5 °F (36.4 °C)   TempSrc: Infrared   SpO2: 98%   Weight: 156 lb (70.8 kg)   Height: 5' 2\" (1.575 m)     Wt Readings from Last 3 Encounters:   10/12/20 156 lb (70.8 kg)   09/26/20 150 lb (68 kg)   08/17/20 152 lb 9.6 oz (69.2 kg)     BP Readings from Last 3 Encounters:   10/12/20 128/66   09/26/20 (!) 145/69   08/17/20 92/68     Body mass index is 28.53 kg/m². Constitutional: Patient appears well-developed and well-nourished. No distress. Head: Normocephalic and atraumatic. Neck: Normal range of motion. Neck supple. No thyroidmegaly. Cardiovascular: Normal rate, regular rhythm, normal heart sounds and intact distal pulses. Pulmonary/Chest: Effort normal and breath sounds normal. No stridor. No respiratory distress. No wheezes and no rales. Abdominal: Soft. Bowel sounds are normal. No distension and no mass. No tenderness. No rebound and no guarding. Musculoskeletal: No edema and no tenderness. Skin: No rash or erythema. Psychiatric: Normal mood and affect. Behavior is normal.     Assessment/Plan:  Naz Boateng was seen today for abdominal pain. Diagnoses and all orders for this visit:    Gastroesophageal reflux disease without esophagitis  -     omeprazole (PRILOSEC) 20 MG delayed release capsule; Take 1 capsule by mouth every morning (before breakfast)    Hiatal hernia  -     omeprazole (PRILOSEC) 20 MG delayed release capsule; Take 1 capsule by mouth every morning (before breakfast)    Mild persistent asthma without complication  -     fluticasone-salmeterol (ADVAIR) 100-50 MCG/DOSE diskus inhaler; INHALE ONE PUFF BY MOUTH EVERY 12 HOURS    Environmental and seasonal allergies        Return April 12 GERD and Hiatal Hernia f/u.

## 2020-11-09 ENCOUNTER — OFFICE VISIT (OUTPATIENT)
Dept: ORTHOPEDIC SURGERY | Age: 80
End: 2020-11-09
Payer: MEDICARE

## 2020-11-09 VITALS — HEIGHT: 62 IN | BODY MASS INDEX: 26.68 KG/M2 | WEIGHT: 145 LBS

## 2020-11-09 PROCEDURE — 99213 OFFICE O/P EST LOW 20 MIN: CPT | Performed by: ORTHOPAEDIC SURGERY

## 2020-11-09 NOTE — PROGRESS NOTES
Chief Complaint  Knee Pain (LEFT   )      History of Present Illness:  Per Tijerina is a 79 y.o. female here for follow-up regarding her left knee. She has known degenerative osteoarthritis. She is been treated in the past with viscosupplementation which beneficial    Her previous injection of Visco was in February 2019. Medical History  Patient's medications, allergies, past medical, surgical, social and family histories were reviewed and updated as appropriate. Review of Systems  Pertinent items are noted in HPI. Relevant review of systems reviewed and available in the patient's chart    Vital Signs  There were no vitals filed for this visit. Pain Assessment: 3/10      General Exam:   Constitutional: Patient is adequately groomed with no evidence of malnutrition  Mental Status: The patient is oriented to time, place and person. The patient's mood and affect are appropriate. Lymphatic: The lymphatic examination bilaterally reveals all areas to be without enlargement or induration. Vascular: Examination reveals no swelling or calf tenderness. Peripheral pulses are palpable and 2+. Neurological: The patient has good coordination. There is no weakness or sensory deficit. Left knee Examination  Inspection:  No gross deformities noted. Minimal swelling, no ecchymosis or erythema. Incision site is clean, dry, intact and well-healed. Palpation: Some tenderness to palpation in the medial compartment. Does not have an effusion. Range of Motion:  Nearly full range of motion with knee flexion and extension    Strength: She appears to have good strength. She does states that in the mornings that she has some difficulty with ambulation secondary to stiffness. Special Tests: No instability. Skin: There are no rashes, ulcerations or lesions. Gait: Shuffling gait.     Reflex: Normal    Additional Examinations:  Contralateral Exam: She does have some known degenerative changes involving the left knee. XR KNEE LEFT (MIN 4 VIEWS)  Diagnostic Test Findings:  4 views of her left knee today show progressive degenerative changes primarily involving the medial compartment. She has medial joint space narrowing but not collapse. He has some minimal changes in the lateral compartment and the patellofemoral joint. 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 Impression:  Encounter Diagnosis   Name Primary?  Left knee pain, unspecified chronicity Yes       Office Procedures:  Orders Placed This Encounter   Procedures    XR KNEE LEFT (MIN 4 VIEWS)       Treatment Plan: We will submit for of Visco supplementation injection of her left nonsurgical knee. She is having some symptoms involving her right hip and her right knee. She will follow-up for those evaluations at a later date. This dictation was performed with a verbal recognition program (DRAGON) and it was checked for errors. It is possible that there are still dictated errors within this office note. If so, please bring any errors to my attention for an addendum. All efforts were made to ensure that this office note is accurate.

## 2020-11-16 ENCOUNTER — NURSE TRIAGE (OUTPATIENT)
Dept: OTHER | Facility: CLINIC | Age: 80
End: 2020-11-16

## 2020-11-16 ENCOUNTER — OFFICE VISIT (OUTPATIENT)
Dept: INTERNAL MEDICINE CLINIC | Age: 80
End: 2020-11-16
Payer: MEDICARE

## 2020-11-16 VITALS
HEART RATE: 56 BPM | SYSTOLIC BLOOD PRESSURE: 120 MMHG | WEIGHT: 156 LBS | TEMPERATURE: 97.8 F | OXYGEN SATURATION: 96 % | HEIGHT: 62 IN | DIASTOLIC BLOOD PRESSURE: 66 MMHG | BODY MASS INDEX: 28.71 KG/M2

## 2020-11-16 PROCEDURE — 99213 OFFICE O/P EST LOW 20 MIN: CPT | Performed by: INTERNAL MEDICINE

## 2020-11-16 NOTE — TELEPHONE ENCOUNTER
Reason for Disposition   [1] Constant abdominal pain AND [2] present > 2 hours  (NO pain or tenderness of hernia)    Answer Assessment - Initial Assessment Questions  1. ONSET:  \"When did this first appear? \"      Pt was diagnosed with hernia about a month and half ago      In past couple of days pt has had increased pain with the R side in upper abd under ribs    2. APPEARANCE: \"What does it look like? \"       Pt is unable to give an answer based on her weight     3. SIZE: \"How big is it? \" (inches, cm or compare to coins, fruit)         Unsure     4. LOCATION: \"Where exactly is the hernia located? \"       R side in upper abd under ribs     5. PATTERN: \"Does the swelling come and go, or has it been constant since it started? \"      Constant     6. PAIN: \"Is there any pain? \" If so, ask: \"How bad is it? \"  (Scale 1-10; or mild, moderate, severe)       Soreness 6/10    7. DIAGNOSIS: \"Have you been seen by a doctor for this? \" \"Did the doctor diagnose you as having a hernia? \"       Pt was seen in ED in September and diagnosed with Hernia     8. OTHER SYMPTOMS: \"Do you have any other symptoms? \" (e.g., fever, abdominal pain, vomiting)       Denies     9. PREGNANCY: \"Is there any chance you are pregnant? \" \"When was your last menstrual period? \"    Protocols used: HERNIA-ADULT-    Patient called pre-service center [facility]  with red flag complaint. Brief description of triage: see above     Triage indicates for patient to be seen in next 4 hours     Care advice provided, patient verbalizes understanding; denies any other questions or concerns; instructed to call back for any new or worsening symptoms. Writer provided warm transfer to [facility] for appointment scheduling. Attention Provider: Thank you for allowing me to participate in the care of your patient. The patient was connected to triage in response to information provided to the [facility].  Please do not respond through this encounter as the response is not directed to a shared pool.

## 2020-11-16 NOTE — PROGRESS NOTES
Marii Kc  YOB: 1940    Date of Service:  11/16/2020    Chief Complaint:      Chief Complaint   Patient presents with    Abdominal Pain       HPI:  Marii Kc is a 79 y.o. She complain of right abdominal pain and now also right mid back with lifting things and driving her sister to a Wedding in [place] 2 hrs each way. No fever or chills. She's concern that it may have flare up of her hiatal hernia.     Lab Results   Component Value Date    LABA1C 5.5 07/06/2020    LABA1C 5.5 12/13/2019    LABA1C 5.8 04/01/2019    LABMICR YES 09/26/2020     Lab Results   Component Value Date     09/26/2020    K 4.3 09/26/2020     09/26/2020    CO2 27 09/26/2020    BUN 16 09/26/2020    CREATININE <0.5 (L) 09/26/2020    GLUCOSE 81 09/26/2020    CALCIUM 9.1 09/26/2020     Lab Results   Component Value Date    CHOL 202 07/06/2020    TRIG 125 07/06/2020    HDL 69 07/06/2020    LDLCALC 108 07/06/2020     Lab Results   Component Value Date    ALT 12 06/17/2019    AST 18 06/17/2019     Lab Results   Component Value Date    TSH 1.19 09/17/2015    T4FREE 1.1 09/17/2015     Lab Results   Component Value Date    WBC 6.9 09/26/2020    HGB 12.1 09/26/2020    HCT 36.8 09/26/2020    MCV 81.4 09/26/2020     09/26/2020     No results found for: INR   No results found for: PSA   No results found for: LABURIC     Patient Active Problem List   Diagnosis    Environmental and seasonal allergies    Mild persistent asthma without complication    Recurrent UTI    Hematuria    Numbness and tingling in right hand    Hammer toe of right foot    Arthritis of right knee    Status post total left knee replacement    Hiatal hernia    Gastroesophageal reflux disease without esophagitis       Allergies   Allergen Reactions    Cephalexin      Vomiting    Oxycodone     Erythromycin Nausea And Vomiting     Outpatient Medications Marked as Taking for the 11/16/20 encounter (Office Visit) with Robert Calvo MD Medication Sig Dispense Refill    fluticasone-salmeterol (ADVAIR) 100-50 MCG/DOSE diskus inhaler INHALE ONE PUFF BY MOUTH EVERY 12 HOURS 60 each 11    fluticasone (FLONASE) 50 MCG/ACT nasal spray 1 spray by Each Nostril route 2 times daily 1 Bottle 2    albuterol sulfate HFA (VENTOLIN HFA) 108 (90 Base) MCG/ACT inhaler Inhale 2 puffs into the lungs every 4 hours as needed for Wheezing 1 Inhaler 5    montelukast (SINGULAIR) 10 MG tablet Take 1 tablet by mouth nightly 30 tablet 5    fluocinonide (LIDEX) 0.05 % external solution Apply sparingly to affected area(s) of scalp qd prn flares. 60 mL 2    Naproxen Sodium (ALEVE) 220 MG CAPS Take by mouth daily as needed for Pain      Multiple Vitamins-Minerals (CENTRUM SILVER 50+WOMEN) TABS Take by mouth      Cetirizine HCl (ALL DAY ALLERGY PO) Take  by mouth daily. Review of Systems: 14 systems were negative except of what was stated on HPI    Nursing note and vitals reviewed. Vitals:    11/16/20 1222   BP: 120/66   Pulse: 56   Temp: 97.8 °F (36.6 °C)   TempSrc: Infrared   SpO2: 96%   Weight: 156 lb (70.8 kg)   Height: 5' 2\" (1.575 m)     Wt Readings from Last 3 Encounters:   11/16/20 156 lb (70.8 kg)   11/09/20 145 lb (65.8 kg)   10/12/20 156 lb (70.8 kg)     BP Readings from Last 3 Encounters:   11/16/20 120/66   10/12/20 128/66   09/26/20 (!) 145/69     Body mass index is 28.53 kg/m². Constitutional: Patient appears well-developed and well-nourished. No distress. Head: Normocephalic and atraumatic. Neck: Normal range of motion. Neck supple. No thyroidmegaly. Cardiovascular: Normal rate, regular rhythm, normal heart sounds and intact distal pulses. Pulmonary/Chest: Effort normal and breath sounds normal. No stridor. No respiratory distress. No wheezes and no rales. Abdominal: Soft. Bowel sounds are normal. No distension and no mass. No tenderness. No rebound and no guarding. Musculoskeletal: No edema and no tenderness.    Skin: No rash or erythema. Psychiatric: Normal mood and affect. Behavior is normal.   Some discomfort with palpating right thoracic area of back and along anterior diaphram.  Negative Burton's sign. Assessment/Plan:  Dhiraj Gutierres was seen today for abdominal pain. Diagnoses and all orders for this visit:    Abdominal muscle pain    Back strain, initial encounter    start Aleve 1-2 bid prn  Continue Prilosec qd  Can refer to surgeon for her hiatal hernia if continue pain since pt is concern about the hernia    Return if symptoms worsen or fail to improve.

## 2020-11-30 ENCOUNTER — TELEPHONE (OUTPATIENT)
Dept: ORTHOPEDIC SURGERY | Age: 80
End: 2020-11-30

## 2020-11-30 ENCOUNTER — OFFICE VISIT (OUTPATIENT)
Dept: ORTHOPEDIC SURGERY | Age: 80
End: 2020-11-30
Payer: MEDICARE

## 2020-11-30 VITALS — WEIGHT: 145 LBS | HEIGHT: 62 IN | BODY MASS INDEX: 26.68 KG/M2

## 2020-11-30 PROCEDURE — 99213 OFFICE O/P EST LOW 20 MIN: CPT | Performed by: ORTHOPAEDIC SURGERY

## 2020-11-30 NOTE — PROGRESS NOTES
Chief Complaint  No chief complaint on file.    right knee status post total knee arthroplasty. History of Present Illness:  Emi Nugent is a 79 y.o. female here regarding her right knee status post total knee arthroplasty. Patient is doing well overall. Denies any pain and is ambulating without any assistive devices. He has had some morning stiffness that is bothering her and she was just concerned that possibly there was something wrong with the total knee. It is never any discomfort that is long-lasting usually improves within the first 30 or 40 minutes after getting up. She occasionally takes ibuprofen for her knee if she can remember    Medical History  Patient's medications, allergies, past medical, surgical, social and family histories were reviewed and updated as appropriate. Review of Systems  Pertinent items are noted in HPI. Relevant review of systems reviewed and available in the patient's chart    Vital Signs  There were no vitals filed for this visit. Pain Assessment:    General Exam:   Constitutional: Patient is adequately groomed with no evidence of malnutrition  Mental Status: The patient is oriented to time, place and person. The patient's mood and affect are appropriate. Lymphatic: The lymphatic examination bilaterally reveals all areas to be without enlargement or induration. Vascular: Examination reveals no swelling or calf tenderness. Peripheral pulses are palpable and 2+. Neurological: The patient has good coordination. There is no weakness or sensory deficit. Right Knee Examination    Inspection:  No gross deformities noted. Minimal swelling, no ecchymosis or erythema. Incision site is clean, dry, intact and well-healed.      Palpation:  Nontender to palpation    Range of Motion:  Nearly full range of motion with knee flexion and extension    Strength: Normal adequate strength    Special Tests:  Not tested today    Skin: There are no rashes, ulcerations or lesions. Gait: Normal gait    Reflex: Normal    Additional Examinations:  Contralateral Exam: She does have some known degenerative changes involving the left knee. Diagnostic Test Findings:    3 views of the right knee were obtained today. Total knee alignment appears to be satisfactory. There is no evidence of loosening I see no abnormality of the total knee. 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 Obvious abnormality. Impression:  No diagnosis found. Office Procedures:  No orders of the defined types were placed in this encounter. Treatment Plan:      No specific treatment indicated. She will follow-up as needed for her right knee. She is scheduled for viscosupplementation of her left knee. This dictation was performed with a verbal recognition program (DRAGON) and it was checked for errors. It is possible that there are still dictated errors within this office note. If so, please bring any errors to my attention for an addendum. All efforts were made to ensure that this office note is accurate.

## 2020-12-11 ENCOUNTER — OFFICE VISIT (OUTPATIENT)
Dept: ORTHOPEDIC SURGERY | Age: 80
End: 2020-12-11
Payer: MEDICARE

## 2020-12-11 VITALS — BODY MASS INDEX: 26.68 KG/M2 | WEIGHT: 145 LBS | HEIGHT: 62 IN

## 2020-12-11 PROCEDURE — 20610 DRAIN/INJ JOINT/BURSA W/O US: CPT | Performed by: ORTHOPAEDIC SURGERY

## 2020-12-11 PROCEDURE — 99213 OFFICE O/P EST LOW 20 MIN: CPT | Performed by: ORTHOPAEDIC SURGERY

## 2020-12-11 RX ORDER — LIDOCAINE HYDROCHLORIDE 10 MG/ML
20 INJECTION, SOLUTION INFILTRATION; PERINEURAL ONCE
Status: COMPLETED | OUTPATIENT
Start: 2020-12-11 | End: 2020-12-11

## 2020-12-11 RX ADMIN — LIDOCAINE HYDROCHLORIDE 20 ML: 10 INJECTION, SOLUTION INFILTRATION; PERINEURAL at 08:39

## 2020-12-11 NOTE — PROGRESS NOTES
Lars Gómez MD              [place], [place]         Orthopaedic Surgery and Sports Medicine    Patient Name: Symone Downing  YOB: 1940  Patient's PCP is ANDREW IGLESIAS MD    SUBJECTIVE  Chief Complaint:  Knee Pain ( LEFT   DUROLANE)      History of Present Illness:  Symone Downing is a 79 y.o. female here regarding previously documented left knee arthritis. The patient is currently ambulating independently      Location: global  Quality: aching  Pain Scale: 6/10  Context: overall course is worsening  Alleviating Factors: rest  Exacerbating Factors: activity  Associated Symptoms: swelling     Sleep pattern is affected by the chief complaint: Yes    The patient is not working. Patient denies any new injuries. At the patient's previous visit, we discussed the possibility of using Durolane viscosupplementation injections and the patient would like to proceed with that today. Pain Assessment:       Review of Systems:  Ninfa Silvestre's review of systems has been performed by intake and observation. All past and current ROS forms have been scanned into the medical record. She has been instructed to contact her primary care provider regarding ROS issues if not already being addressed at this time. There are no recent changes.  The most recent ROS was scanned into media on 11/30/2020    Past Medical History:  (see most recent intake form scanned into media on above date)  Past Medical History:   Diagnosis Date    Allergic rhinitis     Arthritis     Asthma     mild    GERD (gastroesophageal reflux disease)     Hematuria, microscopic 02/2018    Knee joint disorder 2018    left knee injection x3    Knee pain, right     PONV (postoperative nausea and vomiting)     Reflux     Seasonal allergies     Tinnitus        Past Surgical History:  (see most recent intake form scanned into media on above (LIDEX) 0.05 % external solution Apply sparingly to affected area(s) of scalp qd prn flares. 60 mL 2    Naproxen Sodium (ALEVE) 220 MG CAPS Take by mouth daily as needed for Pain      Multiple Vitamins-Minerals (CENTRUM SILVER 50+WOMEN) TABS Take by mouth      Cetirizine HCl (ALL DAY ALLERGY PO) Take  by mouth daily. No current facility-administered medications for this visit. OBJECTIVE  PHYSICAL EXAM  Vital Signs: There were no vitals filed for this visit. Body mass index is 26.52 kg/m². General Appearance: Patient is adequately groomed with no evidence of malnutrition   Orientation: Patient is alert and oriented to person, place and time  Mood and Affect: Neutral/Euthymic(normal)  Gait and Station: abnormal and antalgic. The patient can bear weight on the extremity. Left Knee Examination  Inspection:  Knee alignment: mild varus           mild swelling noted. No erythema or ecchymosis. Palpation: moderate tenderness to palpation on the medial joint line. a small effusion noted. Range of Motion:  somewhat limited due to pain    Stability and Special Testing: Lai's test: negative             Laxity with varus stress testing: negative             Laxity with valgus stress testing: negative    Strength: No gross motor weakness noted. All muscle groups appear to be functioning. Motor exam of the lower extremities show quadriceps, hamstrings, foot dorsiflexion and plantarflexion grossly intact. Neurologic: Sensation to both feet is grossly intact to light touch. Vascular: The bilateral lower extremities are warm and well-perfused with brisk capillary refill. Lymphatic: The lymphatic examination bilaterally reveals all areas to be without enlargement or induration    Skin: intact with no cellulitis, rashes, ulcerations, lymphedema or cutaneous lesions noted. DIAGNOSTICS  None today. ASSESSMENT (Medical Decision Making)    Chi Tijerina is a 79 y.o. female with the following diagnosis: left knee arthritis      ICD-10-CM    1. Primary osteoarthritis of left knee  M17.12 [id number] - FL DRAIN/INJECT LARGE JOINT/BURSA           PLAN (Medical Decision Making)  Office Procedures:  Orders Placed This Encounter   Procedures    [id number] - FL DRAIN/INJECT LARGE JOINT/BURSA       The patients left knee was initially prepped using Betadine swab. The superior lateral aspect of the knee was prepped and used for this injection. Under aseptic technique the soft tissues were anesthetized using 1% Lidocaine without epinephrine. A total of 2ml of Lidocaine was injected into the soft tissues leading up to the joint capsule. Following adequate anesthesia, the 3.0 ml of Durolane injection was performed. This was injected directly into the joint capsule of the knee. No complications were encountered and the patient tolerated the procedure well. A band aid was placed over the injection site following the procedure. Treatment Plan:   1) ice, elevation, gentle range of motion as needed for swelling or stiffness of the knee  2) NSAIDs for any pain after injection   3) F/U as needed    [place] was instructed to call the office if her symptoms worsen or if new symptoms appear prior to the next scheduled visit. She is specifically instructed to contact the office between now and schedule appointment if she has concerns related to her condition or if she needs assistance in scheduling any above tests. She is welcome to call for an appointment sooner if she has any additional concerns or questions. This dictation was performed with a verbal recognition program (DRAGON) and it was checked for errors. It is possible that there are still dictated errors within this office note. If so, please bring any errors to my attention for an addendum. All efforts were made to ensure that this office note is accurate.

## 2021-01-05 ENCOUNTER — TELEPHONE (OUTPATIENT)
Dept: INTERNAL MEDICINE CLINIC | Age: 81
End: 2021-01-05

## 2021-01-05 NOTE — TELEPHONE ENCOUNTER
Pt was seen Saturday at urgent care and is being treated for UTI. Prescribed Nitrofuran. Has had 3 days of antibiotics. Pt reporting still feeling fatigued, barely eating, weakness. Urinary burning has stopped. Pt just worrying that she is on the right track? Should she still be feeling this way?       Pt phone [phone]

## 2021-01-05 NOTE — TELEPHONE ENCOUNTER
If she doesn't have fever or pain, she can give it more time and if still not better, schedule for follow up

## 2021-01-11 ENCOUNTER — TELEPHONE (OUTPATIENT)
Dept: INTERNAL MEDICINE CLINIC | Age: 81
End: 2021-01-11

## 2021-01-11 ENCOUNTER — VIRTUAL VISIT (OUTPATIENT)
Dept: INTERNAL MEDICINE CLINIC | Age: 81
End: 2021-01-11
Payer: MEDICARE

## 2021-01-11 DIAGNOSIS — R53.83 OTHER FATIGUE: ICD-10-CM

## 2021-01-11 DIAGNOSIS — N39.0 UTI (URINARY TRACT INFECTION), UNCOMPLICATED: Primary | ICD-10-CM

## 2021-01-11 PROCEDURE — 99213 OFFICE O/P EST LOW 20 MIN: CPT | Performed by: INTERNAL MEDICINE

## 2021-01-11 NOTE — TELEPHONE ENCOUNTER
Pt dropped off documentation for VV scheduled today. Scanned into chart in media. Needing a release for work. Letter to [facility]  Attention:  Olya Chamorro, HR    Patient also asked we forward her a copy to  Berkley Hodge. [email or web address]

## 2021-01-11 NOTE — PROGRESS NOTES
Date of Service:  1/11/2021    Chief Complaint:      Chief Complaint   Patient presents with    Letter for School/Work       HPI:  William Dias is a 79 y.o. Pursuant to the emergency declaration under the [address] [place] [address], [facility] waiver authority and the [employer] Act, this Virtual  Video Visit was conducted, with patient's consent, to reduce the patient's risk of exposure to COVID-19 and provide continuity of care. Service is  provided through a video synchronous discussion virtually to substitute for in-person clinic visit with the patient being at home and Dr. Gino Hawthorne being at home. She went to Urgent Care 1/3/21 and given Macrobid 100 mg bid and symptoms have resolve. She went to get tested for COVID 1/6/21 due to fatigue that came back negative. 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     Lab Results   Component Value Date    LABA1C 5.5 07/06/2020    LABA1C 5.5 12/13/2019    LABA1C 5.8 04/01/2019    LABMICR YES 09/26/2020     Lab Results   Component Value Date     09/26/2020    K 4.3 09/26/2020     09/26/2020    CO2 27 09/26/2020    BUN 16 09/26/2020    CREATININE <0.5 (L) 09/26/2020    GLUCOSE 81 09/26/2020    CALCIUM 9.1 09/26/2020     Lab Results   Component Value Date    CHOL 202 07/06/2020    TRIG 125 07/06/2020    HDL 69 07/06/2020    LDLCALC 108 07/06/2020     Lab Results   Component Value Date    ALT 12 06/17/2019    AST 18 06/17/2019     Lab Results   Component Value Date    TSH 1.19 09/17/2015    T4FREE 1.1 09/17/2015     Lab Results   Component Value Date    WBC 6.9 09/26/2020    HGB 12.1 09/26/2020    HCT 36.8 09/26/2020    MCV 81.4 09/26/2020     09/26/2020     No results found for: INR   No results found for: PSA   No results found for: [facility]     Patient Active Problem List   Diagnosis    Environmental and seasonal allergies  Mild persistent asthma without complication    Recurrent UTI    Hematuria    Numbness and tingling in right hand    Hammer toe of right foot    Arthritis of right knee    Status post total left knee replacement    Hiatal hernia    Gastroesophageal reflux disease without esophagitis       Allergies   Allergen Reactions    Cephalexin      Vomiting    Oxycodone     Erythromycin Nausea And Vomiting     Outpatient Medications Marked as Taking for the 1/11/21 encounter (Virtual Visit) with Abeba oRsa MD   Medication Sig Dispense Refill    fluticasone-salmeterol (ADVAIR) 100-50 MCG/DOSE diskus inhaler INHALE ONE PUFF BY MOUTH EVERY 12 HOURS 60 each 11    fluticasone (FLONASE) 50 MCG/ACT nasal spray 1 spray by Each Nostril route 2 times daily 1 Bottle 2    albuterol sulfate HFA (VENTOLIN HFA) 108 (90 Base) MCG/ACT inhaler Inhale 2 puffs into the lungs every 4 hours as needed for Wheezing 1 Inhaler 5    montelukast (SINGULAIR) 10 MG tablet Take 1 tablet by mouth nightly 30 tablet 5    fluocinonide (LIDEX) 0.05 % external solution Apply sparingly to affected area(s) of scalp qd prn flares. 60 mL 2    Naproxen Sodium (ALEVE) 220 MG CAPS Take by mouth daily as needed for Pain      Multiple Vitamins-Minerals (CENTRUM SILVER 50+WOMEN) TABS Take by mouth      Cetirizine HCl (ALL DAY ALLERGY PO) Take  by mouth daily. Review of Systems: 14 systems were negative except of what was stated on HPI    Nursing note and vitals reviewed. There were no vitals filed for this visit. Wt Readings from Last 3 Encounters:   12/11/20 145 lb (65.8 kg)   11/30/20 145 lb (65.8 kg)   11/16/20 156 lb (70.8 kg)     BP Readings from Last 3 Encounters:   11/16/20 120/66   10/12/20 128/66   09/26/20 (!) 145/69     There is no height or weight on file to calculate BMI. Constitutional: Patient appears well-developed and well-nourished. No distress. Head: Normocephalic and atraumatic. Skin: No rash or erythema. Psychiatric: Normal mood and affect. Behavior is normal.       Assessment/Plan:  Rosemary Thomas was seen today for letter for school/work. Diagnoses and all orders for this visit:    UTI (urinary tract infection), uncomplicated    Other fatigue    Letter to go back to work    Return if symptoms worsen or fail to improve.

## 2021-01-11 NOTE — LETTER
[address], [facility] [place] [address]  Phone: [phone]  Fax: [phone]    Renetta Langley MD        January 11, 2021     Patient: Sonia Moore   YOB: 1940   Date of Visit: 1/11/2021       To Whom It May Concern: It is my medical opinion that Gurpreet Taylor may go back to work 1/13/2021. If you have any questions or concerns, please don't hesitate to call. Sincerely,    .     Renetta Langley MD

## 2021-01-23 ENCOUNTER — NURSE TRIAGE (OUTPATIENT)
Dept: OTHER | Facility: CLINIC | Age: 81
End: 2021-01-23

## 2021-01-23 NOTE — TELEPHONE ENCOUNTER
Reason for Disposition   Urinating more frequently than usual (i.e., frequency)    Answer Assessment - Initial Assessment Questions  1. SYMPTOM: \"What's the main symptom you're concerned about? \" (e.g., frequency, incontinence)      Frequency and burning    2. ONSET: \"When did the  frequency start? \"      4am this morning    3. PAIN: \"Is there any pain? \" If so, ask: \"How bad is it? \" (Scale: 1-10; mild, moderate, severe)      6    4. CAUSE: \"What do you think is causing the symptoms? \"      unknown    5. OTHER SYMPTOMS: \"Do you have any other symptoms? \" (e.g., fever, flank pain, blood in urine, pain with urination)      Denies    6. PREGNANCY: \"Is there any chance you are pregnant? \" \"When was your last menstrual period? \"      NA    Protocols used: [facility]    Patient called pre-service center [facility] with red flag complaint. Brief description of triage: urinary frequency and burning     Triage indicates for patient to be seen today but declines UCC or ED. Wants to schedule for Monday. Care advice provided, patient verbalizes understanding; denies any other questions or concerns; instructed to call back for any new or worsening symptoms. Writer provided warm transfer to Rashard at [facility] for appointment scheduling. Attention Provider: Thank you for allowing me to participate in the care of your patient. The patient was connected to triage in response to information provided to the [facility]. Please do not respond through this encounter as the response is not directed to a shared pool.

## 2021-01-28 NOTE — PROGRESS NOTES
 INTRACAPSULAR CATARACT EXTRACTION Left 6/27/2019    PHACO EMULSIFICATION OF CATARACT WITH INTRAOCULAR LENS IMPLANT EYE performed by Lyndsay Olvera MD at [address] Right 06/19/2018    right total knee replacement    KNEE ARTHROSCOPY      TONSILLECTOMY      UPPER GASTROINTESTINAL ENDOSCOPY  3/15/13       Allergies   Allergen Reactions    Cephalexin      Vomiting    Oxycodone     Erythromycin Nausea And Vomiting     Outpatient Medications Marked as Taking for the 2/1/21 encounter (Office Visit) with Cheryl Artis MD   Medication Sig Dispense Refill    ketoconazole (NIZORAL) 2 % shampoo Wash scalp 3 times per week. 1 Bottle 5    fluticasone-salmeterol (ADVAIR) 100-50 MCG/DOSE diskus inhaler INHALE ONE PUFF BY MOUTH EVERY 12 HOURS 60 each 11    fluticasone (FLONASE) 50 MCG/ACT nasal spray 1 spray by Each Nostril route 2 times daily 1 Bottle 2    albuterol sulfate HFA (VENTOLIN HFA) 108 (90 Base) MCG/ACT inhaler Inhale 2 puffs into the lungs every 4 hours as needed for Wheezing 1 Inhaler 5    montelukast (SINGULAIR) 10 MG tablet Take 1 tablet by mouth nightly 30 tablet 5    fluocinonide (LIDEX) 0.05 % external solution Apply sparingly to affected area(s) of scalp qd prn flares. 60 mL 2    Naproxen Sodium (ALEVE) 220 MG CAPS Take by mouth daily as needed for Pain      Multiple Vitamins-Minerals (CENTRUM SILVER 50+WOMEN) TABS Take by mouth      Cetirizine HCl (ALL DAY ALLERGY PO) Take  by mouth daily. Physical Examination     The following were examined and determined to be normal: Psych/Neuro, Conjunctivae/eyelids, Gums/teeth/lips, Abdomen, Back, RUE, LUE, RLE, LLE, Nails/digits and Genitalia/groin/buttocks. The following were examined and determined to be abnormal: Scalp/hair, Head/face, Neck and Breast/axilla/chest.     -General: Well-appearing, NAD  1. L nasal bridge - scar clear   1a.  R NF sulcus - 7mm thick keratotic papule 1b. R proximal forearm - 1.1cm ill-defined mildly scaly thin pink plaque     2. L neck 1, nasal bridge 2, L NF sulcus 1, R 1 and L 1 temples - ill-defined irregularly-shaped roughly-scaling thin pink macule(s)/papule(s)   3. Occipital scalp, posterior neck - ill-defined mildly scaly thin pink plaque   4. R posterior neck base - 3mm dome-shaped slightly translucent papule       Assessment and Plan     1. History of NMSC  -Reviewed sun protective behavior -- sun avoidance during the peak hours of the day, sun-protective clothing (including hat and sunglasses), OTC sunscreen use (water resistant, broad spectrum, SPF at least 30, need for reapplication every 2 to 3 hours), avoidance of tanning beds  -Full skin exam in 1 year (sooner if indicated)      1a. Neoplasm of uncertain behavior of skin - R/o SCC, R NF sulcus   -Discussed possible diagnosis. Patient agreeable to biopsy. Verbal consent obtained after risks (infection, bleeding, scar), benefits and alternatives explained. -Area(s) to be biopsied were marked with a surgical pen. Site(s) were cleansed with alcohol. Local anesthesia achieved with 1% lidocaine with epinephrine/sodium bicarbonate. Shave biopsy performed with a razor blade. Hemostasis was achieved with aluminum chloride. The wound(s) were dressed with petrolatum and covered with a bandage. Specimen(s) sent to pathology. Pt educated re: risk of bleeding, infection, scar and wound care instructions. 1b. Neoplasm of uncertain behavior of skin - R/o superficial BCC, R proximal forearm  -Discussed possible diagnosis. Patient agreeable to biopsy. Verbal consent obtained after risks (infection, bleeding, scar), benefits and alternatives explained.

## 2021-01-28 NOTE — PATIENT INSTRUCTIONS
? Do NOT allow the site to become dry or crusted, or attempt to dry it out with rubbing alcohol or hydrogen peroxide. ? Continue this regimen until the area is pink and healed. Depending on the size and location of your cryosurgery site, healing may take 2 to 4 weeks. ? The area may continue to be pink for several weeks, and over the next few months may become darker or lighter than the surrounding skin. This may be a permanent change. Biopsy Wound Care Instructions    · Keep the bandage in place for 24 hours. · Cleanse the wound with mild soapy water daily  ? Gently dry the area. ? Apply Vaseline or petroleum jelly to the wound using a cotton tipped applicator. ? Cover with a clean bandage. ? Repeat this process until the biopsy site is healed. ? If you had stitches placed, continue treating the site until the stitches are removed. Remember to make an appointment to return to have your stitches removed by our staff. ? You may shower and bathe as usual.       ** Biopsy results generally take around 7 business days to come back. If you have not heard from us by then, please call the office at [phone] between 8AM and 4PM Monday through Friday.

## 2021-02-01 ENCOUNTER — OFFICE VISIT (OUTPATIENT)
Dept: DERMATOLOGY | Age: 81
End: 2021-02-01
Payer: MEDICARE

## 2021-02-01 VITALS — TEMPERATURE: 98.6 F

## 2021-02-01 DIAGNOSIS — Z12.83 SCREENING EXAM FOR SKIN CANCER: ICD-10-CM

## 2021-02-01 DIAGNOSIS — L21.9 SEBORRHEIC DERMATITIS: ICD-10-CM

## 2021-02-01 DIAGNOSIS — L72.0 EPIDERMOID CYST: ICD-10-CM

## 2021-02-01 DIAGNOSIS — R20.9 DISTURBANCE OF SKIN SENSATION: ICD-10-CM

## 2021-02-01 DIAGNOSIS — Z85.828 HISTORY OF NONMELANOMA SKIN CANCER: ICD-10-CM

## 2021-02-01 DIAGNOSIS — L57.0 ACTINIC KERATOSES: ICD-10-CM

## 2021-02-01 DIAGNOSIS — D48.5 NEOPLASM OF UNCERTAIN BEHAVIOR OF SKIN: Primary | ICD-10-CM

## 2021-02-01 PROCEDURE — 17000 DESTRUCT PREMALG LESION: CPT | Performed by: DERMATOLOGY

## 2021-02-01 PROCEDURE — 17003 DESTRUCT PREMALG LES 2-14: CPT | Performed by: DERMATOLOGY

## 2021-02-01 PROCEDURE — 11102 TANGNTL BX SKIN SINGLE LES: CPT | Performed by: DERMATOLOGY

## 2021-02-01 PROCEDURE — 99214 OFFICE O/P EST MOD 30 MIN: CPT | Performed by: DERMATOLOGY

## 2021-02-01 PROCEDURE — 11103 TANGNTL BX SKIN EA SEP/ADDL: CPT | Performed by: DERMATOLOGY

## 2021-02-01 PROCEDURE — 11305 SHAVE SKIN LESION 0.5 CM/<: CPT | Performed by: DERMATOLOGY

## 2021-02-01 RX ORDER — KETOCONAZOLE 20 MG/ML
SHAMPOO TOPICAL
Qty: 1 BOTTLE | Refills: 5 | Status: SHIPPED | OUTPATIENT
Start: 2021-02-01 | End: 2021-10-04 | Stop reason: SDUPTHER

## 2021-02-03 LAB — DERMATOLOGY PATHOLOGY REPORT: ABNORMAL

## 2021-02-05 ENCOUNTER — TELEPHONE (OUTPATIENT)
Dept: DERMATOLOGY | Age: 81
End: 2021-02-05

## 2021-02-05 DIAGNOSIS — C44.320 SCC (SQUAMOUS CELL CARCINOMA), FACE: Primary | ICD-10-CM

## 2021-02-05 NOTE — TELEPHONE ENCOUNTER
Spoke with patient and informed her of biopsy results:      A. Right NF sulcus-   Squamous cell carcinoma in-situ  Refer to Mohs    B. Right forearm-   Squamous cell carcinoma in-situ  Schedule Curettage    C.  Right posterior neck base-   Epidermal cyst   Benign, no further treatment    Scheduled for curettage 2-8-21 And 2:30, 6 mo TBSE 8-30-21 And 9:00 am    Referral to Dr.Emily Mckeon for mohs

## 2021-02-05 NOTE — TELEPHONE ENCOUNTER
----- Message from Jacqueline Foster MD sent at 2/4/2021  8:22 AM EST -----  -R NF sulcus - Refer to Mohs     -R forearm - Schedule curettage     -R posterior neck base - Benign - no treatment needed.

## 2021-02-08 ENCOUNTER — PROCEDURE VISIT (OUTPATIENT)
Dept: DERMATOLOGY | Age: 81
End: 2021-02-08
Payer: MEDICARE

## 2021-02-08 VITALS — TEMPERATURE: 97.9 F

## 2021-02-08 DIAGNOSIS — D04.61 SQUAMOUS CELL CARCINOMA IN SITU (SCCIS) OF SKIN OF RIGHT FOREARM: Primary | ICD-10-CM

## 2021-02-08 PROCEDURE — 17262 DSTRJ MAL LES T/A/L 1.1-2.0: CPT | Performed by: DERMATOLOGY

## 2021-02-08 NOTE — PROGRESS NOTES
[facility]) Dermatology  Erasmo Amezcua MD  [address] [place] [address]  1940    79 y.o. female     Date of Visit: 2/8/2021    Chief Complaint: SCC    History of Present Illness:  1. Pt is here for treatment of SCCIS on R proximal forearm. No concerns since biopsy. Review of Systems: None     Past Medical History, Medications and Allergies reviewed.      Past Medical History:   Diagnosis Date    Allergic rhinitis     Arthritis     Asthma     mild    GERD (gastroesophageal reflux disease)     Hematuria, microscopic 02/2018    Knee joint disorder 2018    left knee injection x3    Knee pain, right     PONV (postoperative nausea and vomiting)     Reflux     Seasonal allergies     Tinnitus      Past Surgical History:   Procedure Laterality Date    CATARACT REMOVAL WITH IMPLANT Right 04/05/2019    CATARACT REMOVAL WITH IMPLANT Left 06/27/2019     PHACO EMULSIFICATION OF CATARACT WITH INTRAOCULAR LENS IMPLANT EYE    COLONOSCOPY  2009    COLONOSCOPY  9/17/14    polyp    CYSTOSCOPY  02/2018    DILATION AND CURETTAGE OF UTERUS      FOOT SURGERY Bilateral [id number]    rt ft 3rd digit proximalinterphalangeal joint arthrodesis/lt  ft 5th/2nd proximal interphalangeal joint arthrodesis    INTRACAPSULAR CATARACT EXTRACTION Right 4/5/2019    PHACO EMULSIFICATION OF CATARACT WITH  INTRAOCULAR LENS IMPLANT RIGHT EYE performed by Marlyn Greene MD at [address] [place] [address] Left 6/27/2019    PHACO EMULSIFICATION OF CATARACT WITH INTRAOCULAR LENS IMPLANT EYE performed by Marlyn Greene MD at [facility] Right 06/19/2018    right total knee replacement    KNEE ARTHROSCOPY      TONSILLECTOMY      UPPER GASTROINTESTINAL ENDOSCOPY  3/15/13       Allergies   Allergen Reactions    Cephalexin      Vomiting    Oxycodone     Erythromycin Nausea And Vomiting     Outpatient Medications Marked as Taking for the 2/8/21 encounter (Procedure visit) with Erasmo Amezcua MD Medication Sig Dispense Refill    ketoconazole (NIZORAL) 2 % shampoo Wash scalp 3 times per week. 1 Bottle 5    fluticasone-salmeterol (ADVAIR) 100-50 MCG/DOSE diskus inhaler INHALE ONE PUFF BY MOUTH EVERY 12 HOURS 60 each 11    fluticasone (FLONASE) 50 MCG/ACT nasal spray 1 spray by Each Nostril route 2 times daily 1 Bottle 2    albuterol sulfate HFA (VENTOLIN HFA) 108 (90 Base) MCG/ACT inhaler Inhale 2 puffs into the lungs every 4 hours as needed for Wheezing 1 Inhaler 5    montelukast (SINGULAIR) 10 MG tablet Take 1 tablet by mouth nightly 30 tablet 5    fluocinonide (LIDEX) 0.05 % external solution Apply sparingly to affected area(s) of scalp qd prn flares. 60 mL 2    Naproxen Sodium (ALEVE) 220 MG CAPS Take by mouth daily as needed for Pain      Multiple Vitamins-Minerals (CENTRUM SILVER 50+WOMEN) TABS Take by mouth      Cetirizine HCl (ALL DAY ALLERGY PO) Take  by mouth daily. Physical Examination     -General: Well-appearing, NAD  1. R proximal forearm - 1.1cm centrally-eroded pink plaque     Assessment and Plan     1. Squamous cell carcinoma in situ, R proximal forearm   -Informed written consent obtained after risks (bleeding, infection, scar, discomfort) and benefits explained.   -Local anesthesia acheived with 1% lidocaine with epinephrine/sodium bicarbonate. Sharp curettage performed in 3 different directions with 3-4mm margins. Final lesion size 1.9cm. Hemostasis obtained with aluminum chloride.   -Edu re: bleeding, discomfort, infection, scar  -Edu re: wound care, risk of recurrence     F/u 6 mo for FSE, sooner prn.

## 2021-02-08 NOTE — PATIENT INSTRUCTIONS
Curettage Wound Care Instructions    ? Keep the bandage in place for 24 hours. ? Cleanse the wound with mild soapy water daily. ? Gently dry the area. ? Apply Vaseline or petroleum jelly to the wound using a cotton tipped applicator. ? Cover with a clean bandage. ? Repeat this process until the curettage site is healed.   ? You may shower and bathe as usual.

## 2021-03-22 ENCOUNTER — PROCEDURE VISIT (OUTPATIENT)
Dept: SURGERY | Age: 81
End: 2021-03-22
Payer: MEDICARE

## 2021-03-22 VITALS — DIASTOLIC BLOOD PRESSURE: 85 MMHG | TEMPERATURE: 97.5 F | SYSTOLIC BLOOD PRESSURE: 153 MMHG

## 2021-03-22 DIAGNOSIS — D04.39 SQUAMOUS CELL CARCINOMA IN SITU (SCCIS) OF SKIN OF RIGHT CHEEK: Primary | ICD-10-CM

## 2021-03-22 PROCEDURE — 17311 MOHS 1 STAGE H/N/HF/G: CPT | Performed by: DERMATOLOGY

## 2021-03-22 PROCEDURE — 12052 INTMD RPR FACE/MM 2.6-5.0 CM: CPT | Performed by: DERMATOLOGY

## 2021-03-22 NOTE — PROGRESS NOTES
MOHS PROCEDURE NOTE    PHYSICIAN:  Gabriele Henderson. Candido Hoffman MD    ASSISTANT: Kenya Manzo, RN and Katharine Sánchez RN     REFERRING PROVIDER:  Carlena Buerger, MD      PREOPERATIVE DIAGNOSIS: Squamous Cell Carcinoma in Situ     SPECIFIC MOHS INDICATIONS:  location    AUC SCORIN/9    POSTOPERATIVE DIAGNOSIS: SAME    LOCATION: Right nasofacial sulcus    OPERATIVE PROCEDURE:  MOHS MICROGRAPHIC SURGERY    RECONSTRUCTION OF DEFECT: Intermediate layered closure    PREOPERATIVE SIZE: 10x5 MM    DEFECT SIZE: 18x10 MM    LENGTH OF REPAIRED WOUND/SIZE OF FLAP/SIZE OF GRAFT:  35 MM    ANESTHESIA:  5.5mL 1% lidocaine with epinephrine 1:100,000 buffered. EBL:  MINIMAL    DURATION OF PROCEDURE:  1 HOURS    POSTOPERATIVE OBSERVATION: 1 HOUR    SPECIMENS:  SEE MOHS MAP    COMPLICATIONS:  NONE    DESCRIPTION OF PROCEDURE:  The patient was given a mirror, as appropriate, and the biopsy site was identified, marked with a surgical marking pen, and verified by the patient. Options for treatment were discussed and the patient was informed that Mohs surgery was the selected treatment based on its lower recurrence rate, given the features listed above, as compared to other treatment modalities such as excision, radiation, or curettage, and agreed with this treatment plan. Risks and benefits including bruising, swelling, bleeding, infection, nerve injury, recurrence, and scarring were discussed with the patient prior to the procedure and a written consent detailing these and other risks was reviewed with the patient and signed. There was a time out for person and procedure verification. The surgical site was prepped with an antiseptic solution. Application of an antiseptic solution was repeated before each surgical stage. Stage I:  The clinically-apparent tumor was carefully defined and debulked, determining the edge of the surgical excision.     A thin layer of tumor-laden tissue was excised with a narrow margin of normal-appearing skin, using the technique of Mohs. A map was prepared to correspond to the area of skin from which it was excised. Hemostasis was achieved using electrosurgery. The wound was bandaged. The tissue was prepared for the cryostat and sectioned. 1 section(s) prepared. Each section was coded, cut, and stained for microscopic examination. The entire base and margins of the excised piece of tissue were examined by the surgeon. The tissue was examined to the level of subcut fat. No tumor was identified at the peripheral margins of stage I of microscopically controlled surgery. DEFECT MANAGEMENT:    REPAIR DESCRIPTION:  Various closure modalities were discussed with the patient, and it was decided that an intermediate layered repair would best preserve normal anatomic and functional relationships. Additional risk of wound dehiscence was discussed. The area was anesthetized with 1% lidocaine with epinephrine 1:100,000 buffered, was given a sterile prep using Chlorhexidine gluconate 4% solution and draped in the usual sterile fashion. Recreation and enlargement of the wound was performed by excising cones of tissue via the triangulation technique. The final incision lines were placed with respect for the patient's natural skin tension lines in a linear configuration to avoid functional and aesthetic distortion of adjacent free margins. Following minimal undermining, meticulous hemostasis was obtained with spot monopolar electrocoagulation. Subcutaneous dead space and dermis were closed using 5-0 Vicryl buried subcutaneous interrupted suture and the epidermis was approximated with 5-0 Fast absorbing gut running epidermal sutures. WOUND COVERAGE:  The wound was cleaned with normal saline solution, dried off, Aquaphor ointment was applied, and the wound was covered. A dressing was applied for stabilization and light pressure.   The patient was given detailed oral and written instructions on postoperative care. There were no complications. The patient left the Unit in good medical condition. FOLLOW-UP:  As dissolving sutures were placed, the patient was asked to return if any questions or concerns arose, but otherwise will return to see general dermatology per their instructions.

## 2021-03-22 NOTE — PATIENT INSTRUCTIONS
[facility] Office Hours:    Monday-Thursday  7:30 AM-4:30 PM    Friday  9:00 AM-1:00 PM    POST-OPERATIVE CARE FOR DISSOLVING STICHES             Bandage change after 48 hours    During your procedure today, dissolving sutures, or stiches, were used to close the wound. You do not have to have stiches removed. They will dissolve (melt away) on their own. Please follow these instructions to help you recover from your procedure and help your wound heal.    CARING FOR YOUR SURGICAL SITE  The bandage should remain on and completley dry for 48 hours. Do NOT get the bandage wet. 1. After the first 48 hours, gently remove the remaining part of the bandage. It can be helpful to moisten the bandage edges in the shower. Steri strips may still be on the wound. It is ok, they will fall off slowly with the daily bandage changes. 2. Gently clean AROUND the wound daily with mild soap and water. Please avoid the area from getting wet. The more moisture is on the sutures the quicker they will dissolve. 3. Dry (pat) the area with a clean Q-tip or gauze. Try to clean off any debris or crust from the area. 4. Apply a layer of Vaseline/ Aquaphor (or Bacitracin if your doctor recommends) to the wound area only. 5. Cut a piece of Telfa (or any non-stick dressing) to fit just over the wound and secure it with paper tape. If the wound is small you may use a Band- Aid. Keep area covered for a total of 1 week(s). If the dressing comes off or if you have questions, or concerns about the dressing, please call the office for instructions! POST OPERATIVE INSTRUCTIONS    1. Activity: Do not lift anything heavier than a gallon of milk for 1 week. Also, avoid strenuous activity such as running, power walking or contact sports. 2. Eating and drinking: Do not drink alcohol for 48 hours after your procedure. Alcohol increases the chances of bleeding.   3. Medicines   -If you have discomfort, take Acetaminophen (Tylenol or Extra Strength Tylenol). Follow the instructions and warning on the bottle. -If your doctor has prescribed you an Aspirin daily, please keep taking it. Do not take extra Aspirin or medicines containing Aspirin (such as [place] and Excedrin) for 48 hours  after your procedure. Bleeding: If bleeding occurs, DO NOT remove the bandage. Put firm pressure on the area with gauze for 20 minutes without peeking. If the bleeding continue, apply pressure for another 20 minutes. If the bleeding does not stop after you apply pressure, call us right away. If you can not call, go to the nearest emergency room or urgent care facility. What to expect:  You may have these symptoms. They are normal and should get better with time:  1. Swelling. Swelling usually increases for the first 48 hours after your procedure and then begins to improve. Some soreness and redness around your wound. If we worked close to you eyes  (forehead, nose, temple, or upper cheeks) your eyes may become swollen and/ or black and blue. 2. Bruising, which could last 1 week or more. 3. Pink and bumpy appearance to the scar. This may happen a few weeks after your procedure. After 4 weeks, you may gently massage the area each day with facial moisturizer or petroleum jelly (Vaseline or Aquaphor). This will help to smooth the skin and improve the appearance of the scar. The color of your scar will fade over time, but may be pink for several months after the procedure. The scar may take 6 months to 1 year to reach its final color and appearance. 4. \"Spitting\" suture. Occasionally, an inside suture (stitch) does not completely dissolve. When this happens, (generally 4-8 weeks after surgery), it causes a bump or \"pimple\" to form on the scar. This is easily removed and is not at all serious. It does not mean the skin cancer has returned. Contact us if it happens, but do not be alarmed. Vitamin E oil is NOT necessary.  A good moisturizer is just as effective. Sunscreen IS necessary. Use at least and SPF 30 sunscreen daily- even in winter    Call us at [phone] right away if you have any of the following symptoms:  -Bleeding that you can not stop (see highlighted area above)   -Pain that lasts longer than 48 hours  -Your wound becomes  more painful, red or hot  -Bruising and swelling that does not begin to improve within the 48 hours or gets worse suddenly.

## 2021-03-22 NOTE — PROGRESS NOTES
PRE-PROCEDURE SCREENING    Pacemaker/ICD: No  Difficulty with numbing in the past: No  Local Anesthesia Reaction/passing out: No  Latex or adhesive allergy:  No  Bleeding/Clotting Disorders: No  Anticoagulant Therapy: No  Joint prosthesis: Yes, R knee (approx 2018)  Artificial Heart Valve: No  Stroke or Seizures: No  Organ Transplant or Lymphoma: No  Immunosuppression: No  Respiratory Problems: Yes, mild asthma

## 2021-03-23 ENCOUNTER — TELEPHONE (OUTPATIENT)
Dept: SURGERY | Age: 81
End: 2021-03-23

## 2021-04-02 ENCOUNTER — IMMUNIZATION (OUTPATIENT)
Dept: PRIMARY CARE CLINIC | Age: 81
End: 2021-04-02
Payer: MEDICARE

## 2021-04-02 PROCEDURE — 91301 COVID-19, MODERNA VACCINE 100MCG/0.5ML DOSE: CPT | Performed by: FAMILY MEDICINE

## 2021-04-02 PROCEDURE — 0011A COVID-19, MODERNA VACCINE 100MCG/0.5ML DOSE: CPT | Performed by: FAMILY MEDICINE

## 2021-04-12 ENCOUNTER — OFFICE VISIT (OUTPATIENT)
Dept: INTERNAL MEDICINE CLINIC | Age: 81
End: 2021-04-12
Payer: MEDICARE

## 2021-04-12 VITALS
WEIGHT: 156 LBS | BODY MASS INDEX: 28.71 KG/M2 | SYSTOLIC BLOOD PRESSURE: 132 MMHG | OXYGEN SATURATION: 98 % | DIASTOLIC BLOOD PRESSURE: 84 MMHG | HEIGHT: 62 IN | TEMPERATURE: 97.4 F | HEART RATE: 80 BPM

## 2021-04-12 DIAGNOSIS — K44.9 HIATAL HERNIA: ICD-10-CM

## 2021-04-12 DIAGNOSIS — K21.9 GASTROESOPHAGEAL REFLUX DISEASE WITHOUT ESOPHAGITIS: ICD-10-CM

## 2021-04-12 DIAGNOSIS — H61.22 IMPACTED CERUMEN OF LEFT EAR: ICD-10-CM

## 2021-04-12 DIAGNOSIS — J45.30 MILD PERSISTENT ASTHMA WITHOUT COMPLICATION: Primary | ICD-10-CM

## 2021-04-12 DIAGNOSIS — J30.89 ENVIRONMENTAL AND SEASONAL ALLERGIES: ICD-10-CM

## 2021-04-12 PROCEDURE — 99214 OFFICE O/P EST MOD 30 MIN: CPT | Performed by: INTERNAL MEDICINE

## 2021-04-12 RX ORDER — MONTELUKAST SODIUM 10 MG/1
10 TABLET ORAL NIGHTLY
Qty: 30 TABLET | Refills: 11 | Status: SHIPPED | OUTPATIENT
Start: 2021-04-12 | End: 2022-02-23 | Stop reason: SDUPTHER

## 2021-04-12 ASSESSMENT — PATIENT HEALTH QUESTIONNAIRE - PHQ9
SUM OF ALL RESPONSES TO PHQ9 QUESTIONS 1 & 2: 0
SUM OF ALL RESPONSES TO PHQ QUESTIONS 1-9: 0

## 2021-04-12 NOTE — PROGRESS NOTES
Karie Pham  YOB: 1940    Date of Service:  4/12/2021    Chief Complaint:      Chief Complaint   Patient presents with    Gastroesophageal Reflux    Hiatal Hernia       HPI:  Karie Pham is a 79 y.o. She complains of left ear wax and wants that removed since saw ENT recently but no irrigation was  Done. GERD:  resolve on Prilosec 20 mg prn about once every 10 days. Hiatal hernia:  Stable and asymptomatic. Pt agree to seeing specialist at this time. Mild Asthma:  Stable on Advair 100/50 bid, singulair 10 mg qd and ventolin prn  Allergies:  Stable on Zyrtec 10 mg qd and Flonase 1 spray qd.     Lab Results   Component Value Date    LABA1C 5.5 07/06/2020    LABA1C 5.5 12/13/2019    LABA1C 5.8 04/01/2019    LABMICR YES 09/26/2020     Lab Results   Component Value Date     09/26/2020    K 4.3 09/26/2020     09/26/2020    CO2 27 09/26/2020    BUN 16 09/26/2020    CREATININE <0.5 (L) 09/26/2020    GLUCOSE 81 09/26/2020    CALCIUM 9.1 09/26/2020     Lab Results   Component Value Date    CHOL 202 07/06/2020    TRIG 125 07/06/2020    HDL 69 07/06/2020    LDLCALC 108 07/06/2020     Lab Results   Component Value Date    ALT 12 06/17/2019    AST 18 06/17/2019     Lab Results   Component Value Date    TSH 1.19 09/17/2015    T4FREE 1.1 09/17/2015     Lab Results   Component Value Date    WBC 6.9 09/26/2020    HGB 12.1 09/26/2020    HCT 36.8 09/26/2020    MCV 81.4 09/26/2020     09/26/2020     No results found for: INR   No results found for: PSA   No results found for: LABURIC     Patient Active Problem List   Diagnosis    Environmental and seasonal allergies    Mild persistent asthma without complication    Recurrent UTI    Hematuria    Numbness and tingling in right hand    Hammer toe of right foot    Arthritis of right knee    Status post total left knee replacement    Hiatal hernia    Gastroesophageal reflux disease without esophagitis       Allergies   Allergen Effort normal and breath sounds normal. No stridor. No respiratory distress. No wheezes and no rales. Abdominal: Soft. Bowel sounds are normal. No distension and no mass. No tenderness. No rebound and no guarding. Musculoskeletal: No edema and no tenderness. Skin: No rash or erythema. Psychiatric: Normal mood and affect. Behavior is normal.   Left ear with partial cerumen impaction    Assessment/Plan:  Janes Calix was seen today for gastroesophageal reflux and hiatal hernia. Diagnoses and all orders for this visit:    Mild persistent asthma without complication  -     montelukast (SINGULAIR) 10 MG tablet; Take 1 tablet by mouth nightly    Environmental and seasonal allergies  -     montelukast (SINGULAIR) 10 MG tablet; Take 1 tablet by mouth nightly    Gastroesophageal reflux disease without esophagitis  Stable and continue on current medications. Hiatal hernia  Stable    Impacted cerumen of left ear  -     IL REMOVAL IMPACTED CERUMEN IRRIGATION/LVG UNILAT        Return 9/28 or after Fasting Medicare Wellness and f/u.

## 2021-04-30 ENCOUNTER — IMMUNIZATION (OUTPATIENT)
Dept: PRIMARY CARE CLINIC | Age: 81
End: 2021-04-30
Payer: MEDICARE

## 2021-04-30 PROCEDURE — 0012A PR IMM ADMN SARSCOV2 100 MCG/0.5 ML 2ND DOSE: CPT | Performed by: FAMILY MEDICINE

## 2021-04-30 PROCEDURE — 91301 COVID-19, MODERNA VACCINE 100MCG/0.5ML DOSE: CPT | Performed by: FAMILY MEDICINE

## 2021-05-11 ENCOUNTER — APPOINTMENT (OUTPATIENT)
Dept: CT IMAGING | Age: 81
End: 2021-05-11
Payer: MEDICARE

## 2021-05-11 ENCOUNTER — NURSE TRIAGE (OUTPATIENT)
Dept: OTHER | Facility: CLINIC | Age: 81
End: 2021-05-11

## 2021-05-11 ENCOUNTER — APPOINTMENT (OUTPATIENT)
Dept: MRI IMAGING | Age: 81
End: 2021-05-11
Payer: MEDICARE

## 2021-05-11 ENCOUNTER — HOSPITAL ENCOUNTER (OUTPATIENT)
Age: 81
Setting detail: OBSERVATION
Discharge: HOME | End: 2021-05-12
Attending: EMERGENCY MEDICINE | Admitting: INTERNAL MEDICINE
Payer: MEDICARE

## 2021-05-11 ENCOUNTER — APPOINTMENT (OUTPATIENT)
Dept: GENERAL RADIOLOGY | Age: 81
End: 2021-05-11
Payer: MEDICARE

## 2021-05-11 DIAGNOSIS — R27.0 ATAXIA: ICD-10-CM

## 2021-05-11 DIAGNOSIS — R42 DIZZINESS: Primary | ICD-10-CM

## 2021-05-11 LAB
A/G RATIO: 1.2 (ref 1.1–2.2)
ALBUMIN SERPL-MCNC: 4 G/DL (ref 3.4–5)
ALP BLD-CCNC: 72 U/L (ref 40–129)
ALT SERPL-CCNC: 15 U/L (ref 10–40)
ANION GAP SERPL CALCULATED.3IONS-SCNC: 9 MMOL/L (ref 3–16)
AST SERPL-CCNC: 20 U/L (ref 15–37)
BASOPHILS ABSOLUTE: 0 K/UL (ref 0–0.2)
BASOPHILS RELATIVE PERCENT: 1 %
BILIRUB SERPL-MCNC: 0.3 MG/DL (ref 0–1)
BILIRUBIN URINE: NEGATIVE
BLOOD, URINE: ABNORMAL
BUN BLDV-MCNC: 19 MG/DL (ref 7–20)
CALCIUM SERPL-MCNC: 9 MG/DL (ref 8.3–10.6)
CHLORIDE BLD-SCNC: 100 MMOL/L (ref 99–110)
CLARITY: CLEAR
CO2: 28 MMOL/L (ref 21–32)
COLOR: YELLOW
CREAT SERPL-MCNC: 0.6 MG/DL (ref 0.6–1.2)
EKG ATRIAL RATE: 66 BPM
EKG DIAGNOSIS: NORMAL
EKG P AXIS: 23 DEGREES
EKG P-R INTERVAL: 152 MS
EKG Q-T INTERVAL: 418 MS
EKG QRS DURATION: 100 MS
EKG QTC CALCULATION (BAZETT): 438 MS
EKG R AXIS: -22 DEGREES
EKG T AXIS: 2 DEGREES
EKG VENTRICULAR RATE: 66 BPM
EOSINOPHILS ABSOLUTE: 0.3 K/UL (ref 0–0.6)
EOSINOPHILS RELATIVE PERCENT: 6.1 %
EPITHELIAL CELLS, UA: NORMAL /HPF (ref 0–5)
GFR AFRICAN AMERICAN: >60
GFR NON-AFRICAN AMERICAN: >60
GLOBULIN: 3.3 G/DL
GLUCOSE BLD-MCNC: 82 MG/DL (ref 70–99)
GLUCOSE BLD-MCNC: 83 MG/DL (ref 70–99)
GLUCOSE URINE: NEGATIVE MG/DL
HCT VFR BLD CALC: 39 % (ref 36–48)
HEMOGLOBIN: 12.5 G/DL (ref 12–16)
INR BLD: 1.06 (ref 0.86–1.14)
KETONES, URINE: NEGATIVE MG/DL
LEUKOCYTE ESTERASE, URINE: NEGATIVE
LYMPHOCYTES ABSOLUTE: 1.4 K/UL (ref 1–5.1)
LYMPHOCYTES RELATIVE PERCENT: 28.3 %
MCH RBC QN AUTO: 25.3 PG (ref 26–34)
MCHC RBC AUTO-ENTMCNC: 32 G/DL (ref 31–36)
MCV RBC AUTO: 79.1 FL (ref 80–100)
MICROSCOPIC EXAMINATION: YES
MONOCYTES ABSOLUTE: 0.6 K/UL (ref 0–1.3)
MONOCYTES RELATIVE PERCENT: 11.3 %
NEUTROPHILS ABSOLUTE: 2.7 K/UL (ref 1.7–7.7)
NEUTROPHILS RELATIVE PERCENT: 53.3 %
NITRITE, URINE: NEGATIVE
PDW BLD-RTO: 15.7 % (ref 12.4–15.4)
PERFORMED ON: NORMAL
PH UA: 7 (ref 5–8)
PLATELET # BLD: 261 K/UL (ref 135–450)
PMV BLD AUTO: 8.6 FL (ref 5–10.5)
POTASSIUM REFLEX MAGNESIUM: 4.4 MMOL/L (ref 3.5–5.1)
PROTEIN UA: NEGATIVE MG/DL
PROTHROMBIN TIME: 12.3 SEC (ref 10–13.2)
RBC # BLD: 4.93 M/UL (ref 4–5.2)
RBC UA: NORMAL /HPF (ref 0–4)
SODIUM BLD-SCNC: 137 MMOL/L (ref 136–145)
SPECIFIC GRAVITY UA: 1.01 (ref 1–1.03)
TOTAL PROTEIN: 7.3 G/DL (ref 6.4–8.2)
TROPONIN: <0.01 NG/ML
TSH REFLEX: 2.4 UIU/ML (ref 0.27–4.2)
URINE REFLEX TO CULTURE: ABNORMAL
URINE TYPE: ABNORMAL
UROBILINOGEN, URINE: 0.2 E.U./DL
WBC # BLD: 5.1 K/UL (ref 4–11)
WBC UA: NORMAL /HPF (ref 0–5)

## 2021-05-11 PROCEDURE — 85610 PROTHROMBIN TIME: CPT

## 2021-05-11 PROCEDURE — 85025 COMPLETE CBC W/AUTO DIFF WBC: CPT

## 2021-05-11 PROCEDURE — 70551 MRI BRAIN STEM W/O DYE: CPT

## 2021-05-11 PROCEDURE — G0378 HOSPITAL OBSERVATION PER HR: HCPCS

## 2021-05-11 PROCEDURE — 97161 PT EVAL LOW COMPLEX 20 MIN: CPT

## 2021-05-11 PROCEDURE — 93005 ELECTROCARDIOGRAM TRACING: CPT | Performed by: EMERGENCY MEDICINE

## 2021-05-11 PROCEDURE — 6360000004 HC RX CONTRAST MEDICATION: Performed by: EMERGENCY MEDICINE

## 2021-05-11 PROCEDURE — 93010 ELECTROCARDIOGRAM REPORT: CPT | Performed by: INTERNAL MEDICINE

## 2021-05-11 PROCEDURE — 70450 CT HEAD/BRAIN W/O DYE: CPT

## 2021-05-11 PROCEDURE — 80053 COMPREHEN METABOLIC PANEL: CPT

## 2021-05-11 PROCEDURE — 97116 GAIT TRAINING THERAPY: CPT

## 2021-05-11 PROCEDURE — 84484 ASSAY OF TROPONIN QUANT: CPT

## 2021-05-11 PROCEDURE — 81001 URINALYSIS AUTO W/SCOPE: CPT

## 2021-05-11 PROCEDURE — 84443 ASSAY THYROID STIM HORMONE: CPT

## 2021-05-11 PROCEDURE — 97530 THERAPEUTIC ACTIVITIES: CPT

## 2021-05-11 PROCEDURE — 71045 X-RAY EXAM CHEST 1 VIEW: CPT

## 2021-05-11 PROCEDURE — 99284 EMERGENCY DEPT VISIT MOD MDM: CPT

## 2021-05-11 PROCEDURE — 97165 OT EVAL LOW COMPLEX 30 MIN: CPT

## 2021-05-11 PROCEDURE — 70496 CT ANGIOGRAPHY HEAD: CPT

## 2021-05-11 RX ORDER — MONTELUKAST SODIUM 10 MG/1
10 TABLET ORAL NIGHTLY
Status: DISCONTINUED | OUTPATIENT
Start: 2021-05-11 | End: 2021-05-12 | Stop reason: HOSPADM

## 2021-05-11 RX ORDER — PROMETHAZINE HYDROCHLORIDE 25 MG/1
12.5 TABLET ORAL EVERY 6 HOURS PRN
Status: DISCONTINUED | OUTPATIENT
Start: 2021-05-11 | End: 2021-05-12 | Stop reason: HOSPADM

## 2021-05-11 RX ORDER — SODIUM CHLORIDE 0.9 % (FLUSH) 0.9 %
5-40 SYRINGE (ML) INJECTION PRN
Status: DISCONTINUED | OUTPATIENT
Start: 2021-05-11 | End: 2021-05-12 | Stop reason: HOSPADM

## 2021-05-11 RX ORDER — SODIUM CHLORIDE 9 MG/ML
25 INJECTION, SOLUTION INTRAVENOUS PRN
Status: DISCONTINUED | OUTPATIENT
Start: 2021-05-11 | End: 2021-05-12 | Stop reason: HOSPADM

## 2021-05-11 RX ORDER — ASPIRIN 81 MG/1
81 TABLET ORAL DAILY
Status: DISCONTINUED | OUTPATIENT
Start: 2021-05-11 | End: 2021-05-12 | Stop reason: HOSPADM

## 2021-05-11 RX ORDER — ATORVASTATIN CALCIUM 80 MG/1
80 TABLET, FILM COATED ORAL NIGHTLY
Status: DISCONTINUED | OUTPATIENT
Start: 2021-05-11 | End: 2021-05-12 | Stop reason: HOSPADM

## 2021-05-11 RX ORDER — ASPIRIN 300 MG/1
300 SUPPOSITORY RECTAL DAILY
Status: DISCONTINUED | OUTPATIENT
Start: 2021-05-11 | End: 2021-05-12 | Stop reason: HOSPADM

## 2021-05-11 RX ORDER — SODIUM CHLORIDE 0.9 % (FLUSH) 0.9 %
5-40 SYRINGE (ML) INJECTION EVERY 12 HOURS SCHEDULED
Status: DISCONTINUED | OUTPATIENT
Start: 2021-05-11 | End: 2021-05-12 | Stop reason: HOSPADM

## 2021-05-11 RX ORDER — POLYETHYLENE GLYCOL 3350 17 G/17G
17 POWDER, FOR SOLUTION ORAL DAILY PRN
Status: DISCONTINUED | OUTPATIENT
Start: 2021-05-11 | End: 2021-05-12 | Stop reason: HOSPADM

## 2021-05-11 RX ORDER — LABETALOL HYDROCHLORIDE 5 MG/ML
10 INJECTION, SOLUTION INTRAVENOUS EVERY 10 MIN PRN
Status: DISCONTINUED | OUTPATIENT
Start: 2021-05-11 | End: 2021-05-12 | Stop reason: HOSPADM

## 2021-05-11 RX ORDER — ONDANSETRON 2 MG/ML
4 INJECTION INTRAMUSCULAR; INTRAVENOUS EVERY 6 HOURS PRN
Status: DISCONTINUED | OUTPATIENT
Start: 2021-05-11 | End: 2021-05-12 | Stop reason: HOSPADM

## 2021-05-11 RX ADMIN — IOPAMIDOL 75 ML: 755 INJECTION, SOLUTION INTRAVENOUS at 10:21

## 2021-05-11 NOTE — ED NOTES
Patient identified as a positive fall risk on the ED triage fall screening. Patient placed in fall precautions which includes:  yellow fall risk bracelet on wrist,patient wearing shoes, \"Be Safe\" sign placed on patient's door, and bed alarm placed under patient/alarm turned on. Patient instructed on importance of not getting out of bed or ambulating without assistance for safety.              Ghada Livingston RN  05/11/21 [id number]

## 2021-05-11 NOTE — PROGRESS NOTES
Pt admitted to room 355 via wheelchair. Tele box #4 in place, pt aware of placement and reason. Oriented to room, bed rails, call light and bathroom. Pt aware of plan of day including ordered tests, labs and NPO. Pt swallowed water without choking or coughing noted. Pt working on MRI checklist. Will fax to MRI when completed.

## 2021-05-11 NOTE — PROGRESS NOTES
4 Eyes Skin Assessment     NAME:  Karie Pham  YOB: 1940  MEDICAL RECORD NUMBER:  [id number]    The patient is being assess for  Admission    I agree that 2 RN's have performed a thorough Head to Toe Skin Assessment on the patient. ALL assessment sites listed below have been assessed. Areas assessed by both nurses:    Head, Face, Ears, Shoulders, Back, Chest, Arms, Elbows, Hands, Sacrum. Buttock, Coccyx, Ischium and Legs. Feet and Heels        Does the Patient have a Wound?  No noted wound(s)       Alvaro Prevention initiated:  No   Wound Care Orders initiated:  No    Pressure Injury (Stage 3,4, Unstageable, DTI, NWPT, and Complex wounds) if present place consult order under [de-identified] No    New and Established Ostomies if present place consult order under : No      Nurse 1 eSignature: Electronically signed by Jesus Miles RN on 5/11/21 at 3:37 PM EDT    **SHARE this note so that the co-signing nurse is able to place an eSignature**    Nurse 2 eSignature: Electronically signed by Maddie Dobbs RN on 5/11/21 at 5:03 PM EDT

## 2021-05-11 NOTE — PROGRESS NOTES
Occupational Therapy   Occupational Therapy Initial Assessment/Treatment/discharge  1x only   Date: 2021   Patient Name: Gabe Myers  MRN: [id number]     : 1940    Date of Service: 2021    Discharge Recommendations:  Home with assist PRN       Assessment   Performance deficits / Impairments: Decreased ADL status; Decreased balance;Decreased safe awareness    Assessment: Pt 78 yo female functioning with deficits in the areas listed above following mild unsteadiness at home. Pt reports IND PLOF and lives at home with good family support. Pt reports she still works part time and that she is now mostly back to baseline. Pt is currently at a S-SBa level for all functional tasks. pt educated on increased safety awareness for adls at home. No further OT needs at this time. Please re-order if there is a change in status. Disease Specific Education: Pt educated on importance of OOB mobility, prevention of complications of bedrest, and general safety during hospitalization. Pt verbalized understanding    Prognosis: Good  Decision Making: Low Complexity  OT Education: OT Role;IADL Safety  No Skilled OT: Safe to return home; No OT goals identified  REQUIRES OT FOLLOW UP: No  Activity Tolerance  Activity Tolerance: Patient Tolerated treatment well  Activity Tolerance: BP (160/86)  Safety Devices  Safety Devices in place: Yes  Type of devices: Call light within reach; Bed alarm in place; Left in bed;Gait belt;Nurse notified           Patient Diagnosis(es): The encounter diagnosis was Dizziness. has a past medical history of Allergic rhinitis, Arthritis, Asthma, GERD (gastroesophageal reflux disease), Hematuria, microscopic, Knee joint disorder, Knee pain, right, PONV (postoperative nausea and vomiting), Reflux, Seasonal allergies, and Tinnitus. has a past surgical history that includes Tonsillectomy; Colonoscopy (); Upper gastrointestinal endoscopy (3/15/13);  Dilation and curettage of uterus; Colonoscopy (9/17/14); Knee arthroscopy; Foot surgery (Bilateral, [id number]); Cystoscopy (02/2018); joint replacement (Right, 06/19/2018); Cataract removal with implant (Right, 04/05/2019); Intracapsular cataract extraction (Right, 4/5/2019); Cataract removal with implant (Left, 06/27/2019); Intracapsular cataract extraction (Left, 6/27/2019); and Mohs surgery (03/22/2021).            Restrictions  Restrictions/Precautions  Restrictions/Precautions: Fall Risk, General Precautions    Subjective   General  Chart Reviewed: Yes  Patient assessed for rehabilitation services?: Yes  Family / Caregiver Present: No  Referring Practitioner: NARESH Mcclain CNP  Diagnosis: dizziness, unsteady  Subjective  Subjective: Pt agreeable to therapy  General Comment  Comments: RN approved therapy  Patient Currently in Pain: Denies  Vital Signs  Temp: 97.4 °F (36.3 °C)  Temp Source: Oral  Pulse: 66  Heart Rate Source: Monitor  Resp: 16  BP: (!) 148/84  BP Location: Left upper arm  Patient Position: Sitting  Level of Consciousness: Alert (0)  MEWS Score: 1  Patient Currently in Pain: Denies  Oxygen Therapy  SpO2: 97 %  O2 Device: None (Room air)     Social/Functional History  Social/Functional History  Lives With: Alone  Type of Home: House  Home Layout: Two level  Home Access: Stairs to enter with rails  Entrance Stairs - Number of Steps: 4+4  Entrance Stairs - Rails: Right  Bathroom Shower/Tub: Tub/Shower unit, Doors  Bathroom Toilet: Handicap height  Bathroom Equipment: Grab bars around toilet  Home Equipment: Tram Edmonds, 4 wheeled walker  ADL Assistance: [facility]: Independent  Homemaking Responsibilities: Yes  Ambulation Assistance: Independent(without AD)  Active : Yes  Occupation: Part time employment  Type of occupation: SW       Objective        Orientation  Overall Orientation Status: Within Functional Limits  Observation/Palpation  Posture: Good  Balance  Sitting Balance: Independent  Standing

## 2021-05-11 NOTE — H&P
Hospital Medicine History & Physical      PCP: Addie Cabrera MD    Date of Admission: 5/11/2021    Date of Service: Pt seen/examined on 5/11 and Placed in Observation. Chief Complaint:  Gait ataxia/balance issues       History Of Present Illness:   79 y.o. female  who presented to [facility] ED via squad for evaluation of balance issues. Pt reports she has been in her normal health state recently, recently received her 2nd COVID 19 vaccine. 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 She was able to call her son on the phone. She denies and recent fever chills nause, vomting, she has no ha, visual changes or weakness. No sob, chest or palpitations, no GI or  issues. She has normal VS BP a little on the higher side. CT of head and neck w/o issues, ECG wnl, labs bland.      She was admitted for further eval and treatment     Past Medical History:          Diagnosis Date    Allergic rhinitis     Arthritis     Asthma     mild    GERD (gastroesophageal reflux disease)     Hematuria, microscopic 02/2018    Knee joint disorder 2018    left knee injection x3    Knee pain, right     PONV (postoperative nausea and vomiting)     Reflux     Seasonal allergies     Tinnitus        Past Surgical History:          Procedure Laterality Date    CATARACT REMOVAL WITH IMPLANT Right 04/05/2019    CATARACT REMOVAL WITH IMPLANT Left 06/27/2019     PHACO EMULSIFICATION OF CATARACT WITH INTRAOCULAR LENS IMPLANT EYE    COLONOSCOPY  2009    COLONOSCOPY  9/17/14    polyp    CYSTOSCOPY  02/2018    DILATION AND CURETTAGE OF UTERUS      FOOT SURGERY Bilateral [id number]    rt ft 3rd digit proximalinterphalangeal joint arthrodesis/lt  ft 5th/2nd proximal interphalangeal joint arthrodesis    INTRACAPSULAR CATARACT EXTRACTION Right 4/5/2019    PHACO EMULSIFICATION OF CATARACT WITH  INTRAOCULAR LENS IMPLANT RIGHT EYE performed by Reynaldo Clinton MD at [address] [facility] Left 6/27/2019    PHACO EMULSIFICATION OF CATARACT WITH INTRAOCULAR LENS IMPLANT EYE performed by Reynaldo Clinton MD at [address] [facility] Right 06/19/2018    right total knee replacement    KNEE ARTHROSCOPY      MOHS SURGERY  03/22/2021    TONSILLECTOMY      UPPER GASTROINTESTINAL ENDOSCOPY  3/15/13       Medications Prior to Admission:      Prior to Admission medications    Medication Sig Start Date End Date Taking? Authorizing Provider   montelukast (SINGULAIR) 10 MG tablet Take 1 tablet by mouth nightly 4/12/21  Yes Ary Rosa MD   ketoconazole (NIZORAL) 2 % shampoo Wash scalp 3 times per week. 2/1/21  Yes aYs Gutierrez MD   fluticasone-salmeterol (ADVAIR) 100-50 MCG/DOSE diskus inhaler INHALE ONE PUFF BY MOUTH EVERY 12 HOURS 10/12/20  Yes Felix Rosa MD   fluticasone (FLONASE) 50 MCG/ACT nasal spray 1 spray by Each Nostril route 2 times daily 8/17/20  Yes Marielle Burnette MD   albuterol sulfate HFA (VENTOLIN HFA) 108 (90 Base) MCG/ACT inhaler Inhale 2 puffs into the lungs every 4 hours as needed for Wheezing 7/6/20  Yes Karthikeyan Rodriguez MD   Naproxen Sodium (ALEVE) 220 MG CAPS Take by mouth daily as needed for Pain   Yes Historical Provider, MD   Multiple Vitamins-Minerals (CENTRUM SILVER 50+WOMEN) TABS Take by mouth   Yes Historical Provider, MD   Cetirizine HCl (ALL DAY ALLERGY PO) Take  by mouth daily. Yes Historical Provider, MD   omeprazole (PRILOSEC) 20 MG delayed release capsule Take 1 capsule by mouth every morning (before breakfast) 10/12/20 11/11/20  Ary Rosa MD   fluocinonide (LIDEX) 0.05 % external solution Apply sparingly to affected area(s) of scalp qd prn flares.  2/24/20   Yas Gutierrez MD       Allergies:  Oxycodone hcl, Cephalexin, Oxycodone, and Erythromycin    Social History:      TOBACCO:   reports that she has never smoked. She has never used smokeless tobacco.  ETOH:   reports current alcohol use. E-Cigarettes/Vaping Use     Questions Responses    E-Cigarette/Vaping Use Never User    Start Date     Passive Exposure     Quit Date     Counseling Given     Comments             Family History:      Reviewed in detail and negative for DM, CAD, Cancer, CVA. Positive as follows:        Problem Relation Age of Onset    Heart Disease Mother     Arthritis Mother     Cancer Father     Arthritis Father     Arthritis Sister     Rheum Arthritis Brother     Arthritis Brother     Asthma Brother     Asthma Other     Arthritis Brother     Arthritis Brother     Arthritis Brother     Arthritis Brother     Asthma Son     Seizures Sister     Seizures Sister        REVIEW OF SYSTEMS COMPLETED:   Pertinent positives as noted in the HPI. All other systems reviewed and negative. PHYSICAL EXAM PERFORMED:    BP (!) 148/84   Pulse 66   Temp 97.4 °F (36.3 °C) (Oral)   Resp 16   Ht 5' 2\" (1.575 m)   Wt 152 lb 3.2 oz (69 kg)   SpO2 97%   BMI 27.84 kg/m²     General appearance:  No apparent distress, appears stated age and cooperative. HEENT:  Normal cephalic, atraumatic without obvious deformity. Pupils equal, round, and reactive to light. Extra ocular muscles intact. Conjunctivae/corneas clear. Neck: Supple, with full range of motion. No jugular venous distention. Trachea midline. Respiratory:  Normal respiratory effort. Clear to auscultation, bilaterally without Rales/Wheezes/Rhonchi. Cardiovascular:  Regular rate and rhythm with normal S1/S2 without murmurs, rubs or gallops. Abdomen: Soft, non-tender, non-distended with normal bowel sounds. Musculoskeletal:  No clubbing, cyanosis or edema bilaterally. Full range of motion without deformity. Skin: Skin color, texture, turgor normal.  No rashes or lesions.   Neurologic:  Neurovascularly intact without any focal WNL  - will admit observation for MRI need to R/O posterior cirrulation stroke   - asa,statin  - allow permissive HTN until stroke r/o   - tele  - tsh, lipids   - pt/ot no need for SLP     HTN:   - BP running 150's-160's   - no previous hx, will monitor for now     Mild allergy asthma  - prn antihistamine and prn IHBD    GERD- PPI            DVT Prophylaxis: lovenox   Diet: DIET GENERAL;  Code Status: Prior    PT/OT Eval Status: ordered     Dispo - admit observation        Tianna Mann, APRN - CNP    Thank you ANDREW IGLESIAS MD for the opportunity to be involved in this patient's care. If you have any questions or concerns please feel free to contact me at [phone].

## 2021-05-11 NOTE — ED NOTES
Loss of IV access in CT scan. Unsuccessful attempt x 2 by CT tech and ED RN. Charge RN attempting.       Joe Díaz RN  05/11/21 1251

## 2021-05-11 NOTE — TELEPHONE ENCOUNTER
Received call from [address] at [facility]) Jessy with Red Flag Complaint. Brief description of triage: see below    Triage indicates for patient to Go to ED NOW (or PCP triage). Recommended pt go to ED NOW. Pt agreeable to POC. Care advice provided, patient verbalizes understanding; denies any other questions or concerns; instructed to call back for any new or worsening symptoms. Attention Provider: Thank you for allowing me to participate in the care of your patient. The patient was connected to triage in response to information provided to the ECC. Please do not respond through this encounter as the response is not directed to a shared pool. Reason for Disposition   SEVERE dizziness (e.g., unable to stand, requires support to walk, feels like passing out now)    Answer Assessment - Initial Assessment Questions  1. DESCRIPTION: \"Describe your dizziness. \"      Stated she didn't feel like she was going to pass out, but felt like she would fall over; stated she felt like the left side of her head was dizzy; pt denies dizziness. 2. LIGHTHEADED: \"Do you feel lightheaded? \" (e.g., somewhat faint, woozy, weak upon standing)      Pt denies    3. VERTIGO: \"Do you feel like either you or the room is spinning or tilting? \" (i.e. vertigo)      Pt denies    4. SEVERITY: \"How bad is it? \"  \"Do you feel like you are going to faint? \" \"Can you stand and walk? \"    - MILD - walking normally    - MODERATE - interferes with normal activities (e.g., work, school)     - SEVERE - unable to stand, requires support to walk, feels like passing out now. Pt states severe    5. ONSET:  \"When did the dizziness begin? \"      0530 this am upon getting up out of bed    6. AGGRAVATING FACTORS: \"Does anything make it worse? \" (e.g., standing, change in head position)      Standing up    7. HEART RATE: \"Can you tell me your heart rate? \" \"How many beats in 15 seconds? \"  (Note: not all patients can do this)        Pt states normal    8. CAUSE: \"What do you think is causing the dizziness? \"      Pt unsure - had her eyes dilated yesterday at eye      5. RECURRENT SYMPTOM: \"Have you had dizziness before? \" If so, ask: \"When was the last time? \" \"What happened that time? \"      Remembered years ago when she was pregnant    10. OTHER SYMPTOMS: \"Do you have any other symptoms? \" (e.g., fever, chest pain, vomiting, diarrhea, bleeding)        Pt denies    11. PREGNANCY: \"Is there any chance you are pregnant? \" \"When was your last menstrual period? \"        N/A - menopause    Protocols used: [facility]

## 2021-05-11 NOTE — ED PROVIDER NOTES
CHIEF COMPLAINT  Dizziness (started this morning at 0530, has not abated, new onset)      HISTORY OF PRESENT ILLNESS  Mis Zabala is a 79 y.o. female with a history of GERD and asthma who presents to the ER with complaint of feeling off balance. She woke up at 0530 today and when she stood up out of bed, she felt like she would fall over. She was not feeling \"dizzy\" and the room was not spinning. She did not feel lightheaded, just states she felt completely off balance. She had to hold the wall in order to walk to the bathroom. Her symptoms have improved since this morning but have not completely resolved. She still feels like she needs to walk with a wide stance in order to not fall. She denies any vision changes, weakness or numbness. No nausea. No recent trauma. No other complaints, modifying factors or associated symptoms. I have reviewed the following from the nursing documentation.     Past Medical History:   Diagnosis Date    Allergic rhinitis     Arthritis     Asthma     mild    GERD (gastroesophageal reflux disease)     Hematuria, microscopic 02/2018    Knee joint disorder 2018    left knee injection x3    Knee pain, right     PONV (postoperative nausea and vomiting)     Reflux     Seasonal allergies     Tinnitus      Past Surgical History:   Procedure Laterality Date    CATARACT REMOVAL WITH IMPLANT Right 04/05/2019    CATARACT REMOVAL WITH IMPLANT Left 06/27/2019     PHACO EMULSIFICATION OF CATARACT WITH INTRAOCULAR LENS IMPLANT EYE    COLONOSCOPY  2009    COLONOSCOPY  9/17/14    polyp    CYSTOSCOPY  02/2018    DILATION AND CURETTAGE OF UTERUS      FOOT SURGERY Bilateral [id number]    rt ft 3rd digit proximalinterphalangeal joint arthrodesis/lt  ft 5th/2nd proximal interphalangeal joint arthrodesis    INTRACAPSULAR CATARACT EXTRACTION Right 4/5/2019    PHACO EMULSIFICATION OF CATARACT WITH  INTRAOCULAR LENS IMPLANT RIGHT EYE performed by Lonia Babinski, MD at [place] [id number] partner: Not on file     Emotionally abused: Not on file     Physically abused: Not on file     Forced sexual activity: Not on file   Other Topics Concern    Not on file   Social History Narrative    Not on file     No current facility-administered medications for this encounter. Current Outpatient Medications   Medication Sig Dispense Refill    aspirin 81 MG EC tablet Take 1 tablet by mouth daily 30 tablet 3    montelukast (SINGULAIR) 10 MG tablet Take 1 tablet by mouth nightly 30 tablet 11    ketoconazole (NIZORAL) 2 % shampoo Wash scalp 3 times per week. 1 Bottle 5    fluticasone-salmeterol (ADVAIR) 100-50 MCG/DOSE diskus inhaler INHALE ONE PUFF BY MOUTH EVERY 12 HOURS 60 each 11    fluticasone (FLONASE) 50 MCG/ACT nasal spray 1 spray by Each Nostril route 2 times daily 1 Bottle 2    albuterol sulfate HFA (VENTOLIN HFA) 108 (90 Base) MCG/ACT inhaler Inhale 2 puffs into the lungs every 4 hours as needed for Wheezing 1 Inhaler 5    Naproxen Sodium (ALEVE) 220 MG CAPS Take by mouth daily as needed for Pain      Multiple Vitamins-Minerals (CENTRUM SILVER 50+WOMEN) TABS Take by mouth      Cetirizine HCl (ALL DAY ALLERGY PO) Take  by mouth daily.  omeprazole (PRILOSEC) 20 MG delayed release capsule Take 1 capsule by mouth every morning (before breakfast) 30 capsule 5    fluocinonide (LIDEX) 0.05 % external solution Apply sparingly to affected area(s) of scalp qd prn flares. 60 mL 2     Allergies   Allergen Reactions    Oxycodone Hcl Nausea And Vomiting    Cephalexin      Vomiting    Oxycodone     Erythromycin Nausea And Vomiting       REVIEW OF SYSTEMS  10 systems reviewed, pertinent positives per HPI otherwise noted to be negative. PHYSICAL EXAM  BP (!) 148/79   Pulse 84   Temp 97.7 °F (36.5 °C) (Oral)   Resp 18   Ht 5' 2\" (1.575 m)   Wt 152 lb 3.2 oz (69 kg)   SpO2 94%   BMI 27.84 kg/m²   GENERAL APPEARANCE: Awake and alert. Cooperative. No acute distress. HEAD: Normocephalic. Atraumatic. EYES: PERRL. EOM's grossly intact. Normal Test of skew  ENT: Mucous membranes are moist.   NECK: Supple, trachea midline. HEART: RRR. LUNGS: Respirations unlabored. CTAB. Good air exchange. No wheezes, rales, or rhonchi. Speaking comfortably in full sentences. ABDOMEN: Soft. Non-distended. Non-tender. No guarding or rebound. EXTREMITIES: No peripheral edema. MAEE. No acute deformities. SKIN: Warm, dry and intact. No acute rashes. NEUROLOGICAL: Alert and oriented X 3. CN II-XII grossly intact. Strength 5/5, sensation intact. Normal coordination with heel to shin and finer to nose testing in the bed. Gait is ataxic, wide based, unsteady. PSYCHIATRIC: Normal mood and affect. LABS  I have reviewed all labs for this visit.    Results for orders placed or performed during the hospital encounter of 05/11/21   CBC Auto Differential   Result Value Ref Range    WBC 5.1 4.0 - 11.0 K/uL    RBC 4.93 4.00 - 5.20 M/uL    Hemoglobin 12.5 12.0 - 16.0 g/dL    Hematocrit 39.0 36.0 - 48.0 %    MCV 79.1 (L) 80.0 - 100.0 fL    MCH 25.3 (L) 26.0 - 34.0 pg    MCHC 32.0 31.0 - 36.0 g/dL    RDW 15.7 (H) 12.4 - 15.4 %    Platelets 745 079 - 526 K/uL    MPV 8.6 5.0 - 10.5 fL    Neutrophils % 53.3 %    Lymphocytes % 28.3 %    Monocytes % 11.3 %    Eosinophils % 6.1 %    Basophils % 1.0 %    Neutrophils Absolute 2.7 1.7 - 7.7 K/uL    Lymphocytes Absolute 1.4 1.0 - 5.1 K/uL    Monocytes Absolute 0.6 0.0 - 1.3 K/uL    Eosinophils Absolute 0.3 0.0 - 0.6 K/uL    Basophils Absolute 0.0 0.0 - 0.2 K/uL   Comprehensive Metabolic Panel w/ Reflex to MG   Result Value Ref Range    Sodium 137 136 - 145 mmol/L    Potassium reflex Magnesium 4.4 3.5 - 5.1 mmol/L    Chloride 100 99 - 110 mmol/L    CO2 28 21 - 32 mmol/L    Anion Gap 9 3 - 16    Glucose 82 70 - 99 mg/dL    BUN 19 7 - 20 mg/dL    CREATININE 0.6 0.6 - 1.2 mg/dL    GFR Non-African American >60 >60    GFR African American >60 >60    Calcium 9.0 8.3 - 10.6 mg/dL    Total Protein 7.3 6.4 - 8.2 g/dL    Albumin 4.0 3.4 - 5.0 g/dL    Albumin/Globulin Ratio 1.2 1.1 - 2.2    Total Bilirubin 0.3 0.0 - 1.0 mg/dL    Alkaline Phosphatase 72 40 - 129 U/L    ALT 15 10 - 40 U/L    AST 20 15 - 37 U/L    Globulin 3.3 g/dL   Troponin   Result Value Ref Range    Troponin <0.01 <0.01 ng/mL   Protime-INR   Result Value Ref Range    Protime 12.3 10.0 - 13.2 sec    INR 1.06 0.86 - 1.14   Urinalysis Reflex to Culture    Specimen: Urine, clean catch   Result Value Ref Range    Color, UA Yellow Straw/Yellow    Clarity, UA Clear Clear    Glucose, Ur Negative Negative mg/dL    Bilirubin Urine Negative Negative    Ketones, Urine Negative Negative mg/dL    Specific Gravity, UA 1.010 1.005 - 1.030    Blood, Urine TRACE-INTACT (A) Negative    pH, UA 7.0 5.0 - 8.0    Protein, UA Negative Negative mg/dL    Urobilinogen, Urine 0.2 <2.0 E.U./dL    Nitrite, Urine Negative Negative    Leukocyte Esterase, Urine Negative Negative    Microscopic Examination YES     Urine Type NotGiven     Urine Reflex to Culture Not Indicated    Microscopic Urinalysis   Result Value Ref Range    WBC, UA None seen 0 - 5 /HPF    RBC, UA 3-4 0 - 4 /HPF    Epithelial Cells, UA 0-1 0 - 5 /HPF   TSH with Reflex   Result Value Ref Range    TSH 2.40 0.27 - 4.20 uIU/mL   Lipid panel - fasting   Result Value Ref Range    Cholesterol, Total 141 0 - 199 mg/dL    Triglycerides 62 0 - 150 mg/dL    HDL 57 40 - 60 mg/dL    LDL Calculated 72 <100 mg/dL    VLDL Cholesterol Calculated 12 Not Established mg/dL   POCT Glucose   Result Value Ref Range    POC Glucose 83 70 - 99 mg/dl    Performed on ACCU-CHEK    EKG 12 Lead   Result Value Ref Range    Ventricular Rate 66 BPM    Atrial Rate 66 BPM    P-R Interval 152 ms    QRS Duration 100 ms    Q-T Interval 418 ms    QTc Calculation (Bazett) 438 ms    P Axis 23 degrees    R Axis -22 degrees    T Axis 2 degrees    Diagnosis       Normal sinus rhythmNormal ECGWhen compared with ECG of 26-SEP-2020 10:58,No ED COURSE/MDM  Patient seen and evaluated. Here the patient is afebrile with normal vitals signs. Old records reviewed. Am concerned for posterior circulation stroke. Her symptoms do not sound like vertigo. I am concerned by her gait, although her NIHSS is 0. Stroke alert called and spoke with stroke team. Although I do not suspect LVO and she is outside of tpa window. They agree with CT ,CTA and likely further stroke workup inpatient. CT's wnl. Will admit for MRI and stroke workup. Labs and imaging reviewed and results discussed with patient. Patient was reassessed as noted above . Plan of care discussed with patient and family. Patient and family in agreement with plan. CLINICAL IMPRESSION  1. Dizziness    2. Ataxia        Blood pressure (!) 148/79, pulse 84, temperature 97.7 °F (36.5 °C), temperature source Oral, resp. rate 18, height 5' 2\" (1.575 m), weight 152 lb 3.2 oz (69 kg), SpO2 94 %, not currently breastfeeding. DISPOSITION  Jose Echavarria was admitted in stable condition.        Angelique Ward MD  05/12/21 [id number]

## 2021-05-11 NOTE — PROGRESS NOTES
Physical Therapy    Facility/Department: [facility] B3 - MED SURG  Initial Assessment    NAME: Liliam Samano  : 1940  MRN: [id number]    Date of Service: 2021    Discharge Recommendations:  Home with assist PRN, Outpatient PT   PT Equipment Recommendations  Equipment Needed: No  If pt is unable to be seen after this session, please let this note serve as discharge summary. Please see case management note for discharge disposition. Thank you. Assessment   Body structures, Functions, Activity limitations: Decreased functional mobility ; Decreased balance  Assessment: Pt functioning below baseline due to ataxia. Pt previously indep, no use of AD. Pt stated when she got up this morning she did not feel dizzy but very unsteady needing to hold on to objects to keep from falling. During PT evaluation, pt presented with balance deficits mostly related to anxiety of the unsteadiness reoccuring. Decreased cadance and increased ROSAURA. Pt interested in OP PT to improve balance. Treatment Diagnosis: decreased balance  Prognosis: Good  Decision Making: Low Complexity  PT Education: Goals; General Safety;Gait Training;PT Role  Patient Education: Pt expressed understanding on role of PT and safe mobility  Barriers to Learning: none  REQUIRES PT FOLLOW UP: Yes  Activity Tolerance  Activity Tolerance: Patient Tolerated treatment well  Activity Tolerance: BP: 161/90, 77 HR, 99% SpO2       Patient Diagnosis(es): The encounter diagnosis was Dizziness. has a past medical history of Allergic rhinitis, Arthritis, Asthma, GERD (gastroesophageal reflux disease), Hematuria, microscopic, Knee joint disorder, Knee pain, right, PONV (postoperative nausea and vomiting), Reflux, Seasonal allergies, and Tinnitus. has a past surgical history that includes Tonsillectomy; Colonoscopy (); Upper gastrointestinal endoscopy (3/15/13); Dilation and curettage of uterus; Colonoscopy (14); Knee arthroscopy;  Foot surgery (Bilateral, [id number]); Cystoscopy (02/2018); joint replacement (Right, 06/19/2018); Cataract removal with implant (Right, 04/05/2019); Intracapsular cataract extraction (Right, 4/5/2019); Cataract removal with implant (Left, 06/27/2019); Intracapsular cataract extraction (Left, 6/27/2019); and Mohs surgery (03/22/2021). Restrictions  Restrictions/Precautions  Restrictions/Precautions: Fall Risk, General Precautions  Vision/Hearing  Vision: Impaired  Vision Exceptions: Wears glasses at all times  Hearing: Within functional limits     Subjective  General  Chart Reviewed: Yes  Patient assessed for rehabilitation services?: Yes  Response To Previous Treatment: Not applicable  Family / Caregiver Present: No  Referring Practitioner: NARESH Bojorquez CNP  Referral Date : 05/11/21  Diagnosis: ataxia  Follows Commands: Within Functional Limits  General Comment  Comments: cleared by nursing  Subjective  Subjective: pt resting in bed.   Pain Screening  Patient Currently in Pain: Denies  Vital Signs  Patient Currently in Pain: Denies       Orientation  Orientation  Overall Orientation Status: Within Normal Limits  Social/Functional History  Social/Functional History  Lives With: Alone  Type of Home: House  Home Layout: Two level  Home Access: Stairs to enter with rails  Entrance Stairs - Number of Steps: 4+4  Entrance Stairs - Rails: Right  Bathroom Shower/Tub: Tub/Shower unit, Doors  Bathroom Toilet: Handicap height  Bathroom Equipment: Grab bars around toilet  Home Equipment: [place], 4 wheeled walker  ADL Assistance: [address] [place] [address] Avenue: Independent  Homemaking Responsibilities: Yes  Ambulation Assistance: Independent(without AD)  Active : Yes  Occupation: Part time employment  Type of occupation: SW  Cognition        Objective          PROM RLE (degrees)  RLE PROM: WFL  AROM RLE (degrees)  RLE AROM: WFL  PROM LLE (degrees)  LLE PROM: WFL  AROM LLE (degrees)  LLE AROM : WFL  Strength RLE  Strength RLE:

## 2021-05-12 VITALS
TEMPERATURE: 97.7 F | BODY MASS INDEX: 28.01 KG/M2 | WEIGHT: 152.2 LBS | SYSTOLIC BLOOD PRESSURE: 148 MMHG | HEART RATE: 84 BPM | RESPIRATION RATE: 18 BRPM | DIASTOLIC BLOOD PRESSURE: 79 MMHG | HEIGHT: 62 IN | OXYGEN SATURATION: 94 %

## 2021-05-12 LAB
CHOLESTEROL, TOTAL: 141 MG/DL (ref 0–199)
HDLC SERPL-MCNC: 57 MG/DL (ref 40–60)
LDL CHOLESTEROL CALCULATED: 72 MG/DL
TRIGL SERPL-MCNC: 62 MG/DL (ref 0–150)
VLDLC SERPL CALC-MCNC: 12 MG/DL

## 2021-05-12 PROCEDURE — 97112 NEUROMUSCULAR REEDUCATION: CPT

## 2021-05-12 PROCEDURE — 97116 GAIT TRAINING THERAPY: CPT

## 2021-05-12 PROCEDURE — 80061 LIPID PANEL: CPT

## 2021-05-12 PROCEDURE — G0378 HOSPITAL OBSERVATION PER HR: HCPCS

## 2021-05-12 PROCEDURE — 2580000003 HC RX 258: Performed by: NURSE PRACTITIONER

## 2021-05-12 PROCEDURE — 6370000000 HC RX 637 (ALT 250 FOR IP): Performed by: NURSE PRACTITIONER

## 2021-05-12 RX ORDER — ASPIRIN 81 MG/1
81 TABLET ORAL DAILY
Qty: 30 TABLET | Refills: 3 | Status: SHIPPED | OUTPATIENT
Start: 2021-05-12

## 2021-05-12 RX ADMIN — ASPIRIN 81 MG: 81 TABLET, COATED ORAL at 09:14

## 2021-05-12 RX ADMIN — Medication 10 ML: at 09:14

## 2021-05-12 NOTE — PROGRESS NOTES
Physical Therapy  Facility/Department: [facility] B3 - MED SURG  Daily Treatment Note/DC summary   NAME: Gabe Myers  : 1940  MRN: [id number]    Date of Service: 2021    Discharge Recommendations:  Home with assist PRN, Outpatient PT   PT Equipment Recommendations  Equipment Needed: No    Assessment   Body structures, Functions, Activity limitations: Decreased functional mobility ; Decreased balance  Assessment: Pt progressing with balance and gait with effeciency and cadance. Pt less anxious but still cautious thinking about the episode of ataxia. Pt participate in standing balance activityes and handout provided for pt to continue at home. Pt very interested in OP PT to improve overall mobility and balance. No further acute PT needs. Treatment Diagnosis: decreased balance  Prognosis: Good  Decision Making: Low Complexity  PT Education: Goals; General Safety;Gait Training;PT Role  Patient Education: Pt expressed understanding on role of PT and safe mobility  Barriers to Learning: none  REQUIRES PT FOLLOW UP: No  Activity Tolerance  Activity Tolerance: Patient Tolerated treatment well  Activity Tolerance: BP: 108/66, 82 HR, 98% SpO2     Patient Diagnosis(es): The primary encounter diagnosis was Dizziness. A diagnosis of Ataxia was also pertinent to this visit. has a past medical history of Allergic rhinitis, Arthritis, Asthma, GERD (gastroesophageal reflux disease), Hematuria, microscopic, Knee joint disorder, Knee pain, right, PONV (postoperative nausea and vomiting), Reflux, Seasonal allergies, and Tinnitus. has a past surgical history that includes Tonsillectomy; Colonoscopy (); Upper gastrointestinal endoscopy (3/15/13); Dilation and curettage of uterus; Colonoscopy (14); Knee arthroscopy; Foot surgery (Bilateral, [id number]); Cystoscopy (2018); joint replacement (Right, 2018); Cataract removal with implant (Right, 2019);  Intracapsular cataract extraction (Right, 2019); Cataract removal with implant (Left, 06/27/2019); Intracapsular cataract extraction (Left, 6/27/2019); and Mohs surgery (03/22/2021). Restrictions  Restrictions/Precautions  Restrictions/Precautions: Fall Risk, General Precautions  Subjective   General  Chart Reviewed: Yes  Response To Previous Treatment: Patient with no complaints from previous session. Family / Caregiver Present: No  Referring Practitioner: NARESH Langley CNP  Subjective  Subjective: pt resting in bed. General Comment  Comments: cleared by nursing  Pain Screening  Patient Currently in Pain: Denies  Vital Signs  Patient Currently in Pain: Denies       Orientation  Orientation  Overall Orientation Status: Within Normal Limits  Cognition      Objective   Bed mobility  Supine to Sit: Modified independent  Sit to Supine: Modified independent  Transfers  Sit to Stand: Independent  Stand to sit: Independent  Ambulation  Ambulation?: Yes  More Ambulation?: No  Ambulation 1  Surface: level tile  Device: No Device  Assistance: Independent  Quality of Gait: improved cadance  Distance: 150'  Stairs/Curb  Stairs?: Yes  Stairs  # Steps : 12  Stairs Height: 6\"  Device: No Device  Assistance: Supervision     Balance  Posture: Good  Sitting - Static: Good  Sitting - Dynamic: Good  Standing - Static: Good  Standing - Dynamic: Good  Comments: SLS only able to hold 1 second each side. Lateral steps x 15' B, heel raise x 10 reps with SBA               Goals  Short term goals  Time Frame for Short term goals: 5/13  Short term goal 1: Pt will be indep with all mobility by 5/12. - MET  Short term goal 2: Pt will complete TUG demonstrating normal cadance.  5/12: 17.75 seconds  Patient Goals   Patient goals : to go home    Plan    Plan  Times per week: DC  Current Treatment Recommendations: Balance Training, Endurance Training, Gait Training  Safety Devices  Type of devices: Call light within reach, Gait belt, Nurse notified  Restraints  Initially in place: No     Therapy Time   Individual Concurrent Group Co-treatment   Time In 0935         Time Out 1005         Minutes [place] , [place]

## 2021-05-12 NOTE — DISCHARGE SUMMARY
Hospital Medicine Discharge Summary    Patient ID: Jose Echavarria      Patient's PCP: Amos Flor MD    Admit Date: 5/11/2021     Discharge Date:   5/12/21     Admitting Physician: Meron Roca MD     Discharge Physician: NARESH Hurley - CNP     Discharge Diagnoses: Active Hospital Problems    Diagnosis    Ataxia [R27.0]       The patient was seen and examined on day of discharge and this discharge summary is in conjunction with any daily progress note from day of discharge. Hospital Course:   79 y.o. female  who presented to [facility] ED via squad for evaluation of balance issues. Pt reports she has been in her normal health state recently, recently received her 2nd COVID 19 vaccine. 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 She was able to call her son on the phone. She denies and recent fever chills nause, vomting, she has no ha, visual changes or weakness. No sob, chest or palpitations, no GI or  issues.      She has normal VS BP a little on the higher side.  CT of head and neck w/o issues, ECG wnl, labs bland.      She was admitted for further eval and treatment      Ataxia/ balance disturbance- POA, new onset, remains unclear etiology, has significantly improved   - Orthostatics negative, no lab or vSS issues   - head CT and neck CT WNL  - will admit observation for MRI completed R/O posterior cirrulation stroke   - asa,statin  - allow permissive HTN until stroke r/o   - tele  - tsh, lipids - WNL   - pt/ot no need for SLP - OP therpy for balance training   - recommend asa 81 mg daily      HTN: - resolved   - BP running 150's-160's   - no previous hx, will monitor for now      Mild allergy asthma  - prn antihistamine and prn IHBD     GERD- PPI       Physical Exam Performed:     BP (!) 148/79   Pulse 84   Temp 97.7 °F (36.5 °C) (Oral)   Resp 18   Ht 5' 2\" (1.575 m)   Wt 152 lb 3.2 oz (69 kg)   SpO2 94%   BMI 27.84 kg/m²       General appearance:  No apparent distress, appears stated age and cooperative. HEENT:  Normal cephalic, atraumatic without obvious deformity. Pupils equal, round, and reactive to light. Extra ocular muscles intact. Conjunctivae/corneas clear. Neck: Supple, with full range of motion. No jugular venous distention. Trachea midline. Respiratory:  Normal respiratory effort. Clear to auscultation, bilaterally without Rales/Wheezes/Rhonchi. Cardiovascular:  Regular rate and rhythm with normal S1/S2 without murmurs, rubs or gallops. Abdomen: Soft, non-tender, non-distended with normal bowel sounds. Musculoskeletal:  No clubbing, cyanosis or edema bilaterally. Full range of motion without deformity. Skin: Skin color, texture, turgor normal.  No rashes or lesions. Neurologic:  Neurovascularly intact without any focal sensory/motor deficits. Cranial nerves: II-XII intact, grossly non-focal.  Psychiatric:  Alert and oriented, thought content appropriate, normal insight  Capillary Refill: Brisk,< 3 seconds   Peripheral Pulses: +2 palpable, equal bilaterally       Labs: For convenience and continuity at follow-up the following most recent labs are provided:      CBC:    Lab Results   Component Value Date    WBC 5.1 05/11/2021    HGB 12.5 05/11/2021    HCT 39.0 05/11/2021     05/11/2021       Renal:    Lab Results   Component Value Date     05/11/2021    K 4.4 05/11/2021     05/11/2021    CO2 28 05/11/2021    BUN 19 05/11/2021    CREATININE 0.6 05/11/2021    CALCIUM 9.0 05/11/2021         Significant Diagnostic Studies    Radiology:   MRI BRAIN WO CONTRAST   Final Result   No acute infarct.          CTA HEAD NECK W CONTRAST   Final Result   No acute abnormality or flow-limiting stenosis of the major arteries of the   head and neck.      1.5 mm prominent infundibulum at the origin of the right ophthalmic artery. CT HEAD WO CONTRAST   Final Result   No evidence of acute intracranial abnormality. Critical results were called by Dr. Annel Vitale. Lolis Brar MD to [address] [place] [address]   on 5/11/2021 at 09:56. XR CHEST PORTABLE   Final Result   No evidence of acute cardiopulmonary disease. Consults:     IP CONSULT TO HOSPITALIST    Disposition:  Home     Condition at Discharge: Stable    Discharge Instructions/Follow-up:  PCP    Code Status:  Prior     Activity: activity as tolerated    Diet: regular diet      Discharge Medications:     Current Discharge Medication List           Details   aspirin 81 MG EC tablet Take 1 tablet by mouth daily  Qty: 30 tablet, Refills: 3              Details   montelukast (SINGULAIR) 10 MG tablet Take 1 tablet by mouth nightly  Qty: 30 tablet, Refills: 11    Associated Diagnoses: Mild persistent asthma without complication; Environmental and seasonal allergies      ketoconazole (NIZORAL) 2 % shampoo Wash scalp 3 times per week. Qty: 1 Bottle, Refills: 5      fluticasone-salmeterol (ADVAIR) 100-50 MCG/DOSE diskus inhaler INHALE ONE PUFF BY MOUTH EVERY 12 HOURS  Qty: 60 each, Refills: 11    Associated Diagnoses: Mild persistent asthma without complication      fluticasone (FLONASE) 50 MCG/ACT nasal spray 1 spray by Each Nostril route 2 times daily  Qty: 1 Bottle, Refills: 2    Associated Diagnoses:  Allergic rhinitis due to animal hair and dander; Postnasal drip; Nasal congestion      albuterol sulfate HFA (VENTOLIN HFA) 108 (90 Base) MCG/ACT inhaler Inhale 2 puffs into the lungs every 4 hours as needed for Wheezing  Qty: 1 Inhaler, Refills: 5    Associated Diagnoses: Mild persistent asthma without complication      Naproxen Sodium (ALEVE) 220 MG CAPS Take by mouth daily as needed for Pain      Multiple Vitamins-Minerals (CENTRUM SILVER 50+WOMEN) TABS Take by mouth      Cetirizine HCl (ALL DAY ALLERGY PO) Take  by mouth daily. omeprazole (PRILOSEC) 20 MG delayed release capsule Take 1 capsule by mouth every morning (before breakfast)  Qty: 30 capsule, Refills: 5    Associated Diagnoses: Gastroesophageal reflux disease without esophagitis; Hiatal hernia      fluocinonide (LIDEX) 0.05 % external solution Apply sparingly to affected area(s) of scalp qd prn flares. Qty: 60 mL, Refills: 2             Time Spent on discharge is more than 30 minutes in the examination, evaluation, counseling and review of medications and discharge plan. Signed:    Ezio Monday, APRN - CNP   5/12/2021      Thank you Wilda Marmolejo MD for the opportunity to be involved in this patient's care. If you have any questions or concerns please feel free to contact me at [phone].

## 2021-05-12 NOTE — PROGRESS NOTES
Patient AOx4, resting in bed. Patient denies any needs or pain. VS and assessment completed. NSR on tele monitor. SpO2 wnl on RA. Right wrist PIV saline locked. PT/OT has seen the patient. No need for SLP as patient has no issues. Call light and bedside table within reach, bed in lowest position and locked. RN will continue to monitor.

## 2021-05-12 NOTE — CARE COORDINATION
Per conversation w/NP, Bridgette Capps, pt will discharge today with outpatient therapy. CM met with patient and son at bedside. Pt IPTA and denies needs. States son will transport home.

## 2021-05-12 NOTE — PROGRESS NOTES
Discharge orders placed by MD, verified by RN prior to discharge.  denies any discharge needs for the patient. Discharge paperwork complete, reviewed, and signed with the patient. A copy of all paperwork provided. All questions and concerns resolved at the time of review. Patient verbalized understanding of paperwork. Prescriptions sent to pharmacy for the patient. RN removed the tele box, informed CMU of discharge, and removed IV prior to discharge.  Patient discharged to home with family, ambulated independently out of the hospital per her request.

## 2021-05-13 ENCOUNTER — CARE COORDINATION (OUTPATIENT)
Dept: CASE MANAGEMENT | Age: 81
End: 2021-05-13

## 2021-05-13 NOTE — CARE COORDINATION
[facility] Transitions Initial Follow Up Call    Call within 2 business days of discharge: Yes    Patient: Margot Silveira Patient : 1940   MRN: [id number]  Reason for Admission: dizziness  Discharge Date: 21 RARS: No data recorded    Last Discharge [facility]       Complaint Diagnosis Description Type Department Provider    21 Dizziness Dizziness . .. ED to Hosp-Admission (Discharged) (ADMITTED) Maryellen Padilla MD; Susan Lui. .. Attempted to reach patient via phone for initial post hospital transition call. VM left stating purpose of call along with my contact information requesting a return call.           Care Transitions 24 Hour Call    Care Transitions Interventions         Follow Up  Future Appointments   Date Time Provider Michael Chauhan   2021  9:00 AM Sadie Holm MD And Nathanael Bingham RN

## 2021-05-14 ENCOUNTER — CARE COORDINATION (OUTPATIENT)
Dept: CASE MANAGEMENT | Age: 81
End: 2021-05-14

## 2021-05-14 DIAGNOSIS — R27.0 ATAXIA: Primary | ICD-10-CM

## 2021-05-14 NOTE — TELEPHONE ENCOUNTER
Referral done. I do have available appointments on May 19 at 11:30 if she wants to move her appointment to next week.

## 2021-05-14 NOTE — CARE COORDINATION
Patient contacted regarding COVID-19 risk. COVID-19 testing was not done. Patient completed Maryjean Moritz 2-step COVID vaccination on 2021. Call within 2 business days of discharge: Yes  Discharge Date: 21   RARS: No data recorded    Care Transition Nurse contacted the patient by telephone to perform post discharge assessment. Verified name and  with patient as identifiers. Provided introduction to self, and explanation of the CTN role, and reason for call due to risk factors for infection and/or exposure to COVID-19. Symptoms reviewed with patient who verbalized the following symptoms: no new symptoms. Due to no new or worsening symptoms encounter was not routed to provider for escalation. Discussed follow-up appointments. If no appointment was previously scheduled, appointment scheduling offered: Yes  [facility] follow up appointment(s):   Future Appointments   Date Time Provider Michael Chauhan   2021  1:40 PM Nehal Rosa MD [facility]   2021  9:00 AM Fady Chowdhury MD [facility]     Non-[facility] follow up appointment(s): [facility]    Non-face-to-face services provided:  Obtained and reviewed discharge summary and/or continuity of care documents    Advance Care Planning:   Does patient have an Advance Directive:  not on file. Educated patient about risk for severe COVID-19 due to risk factors according to CDC guidelines. CTN reviewed discharge instructions, medical action plan and red flag symptoms patient who verbalized understanding. Discussed COVID vaccination status Yes. Education provided on COVID-19 vaccination as appropriate. Discussed exposure protocols and quarantine with CDC Guidelines. Patient was given an opportunity to verbalize any questions and concerns and agrees to contact the CTN or health care provider for questions related to their healthcare. Reviewed and educated patient on any new and changed medications related to discharge diagnosis.     Was patient discharged with a pulse oximeter? No     Feels well today. Had ASA 81mg at home and taking. Has inhalers. States she was to get an order for outpatient therapy at discharge to work with her on balance and home exercise programs. She needs the order and want to go to a [facility]) facility. States she only needs for balance and wants to work on this at home to prevent further episodes and know what needs to be done if it happens again. CTN will send message via Epic for PCP to request order. Patient scheduled hospital follow up appointment 6/7/2021 which was soonest available. CTN provided contact information for future needs. Epic inbox to PCP requesting PT order.      Shaniqua Alberto RN  Care Transitions Nurse  [phone] mobile

## 2021-05-14 NOTE — CARE COORDINATION
CTN Follow Up:    Received response from PCP: Referral done. I do have available appointments on May 19 at 11:30 if she wants to move her appointment to next week. Outreach to Vivi Molina. Provided her with number to call and schedule PT eval. Offered her appointment time above, which she declined because she will be working at that date/time. She would like to keep current appointment as scheduled below.      Francis Molina, RN  Care Transition Nurse  [phone] mobile    Future Appointments   Date Time Provider Michael Chauhan   6/7/2021  1:40 PM Kay Rosa MD [facility]   8/30/2021  9:00 AM Juan Kiran MD And Derm RENALDO

## 2021-05-21 ENCOUNTER — CARE COORDINATION (OUTPATIENT)
Dept: CASE MANAGEMENT | Age: 81
End: 2021-05-21

## 2021-05-21 NOTE — CARE COORDINATION
[facility] Transitions Follow Up Call    2021    Patient: Iqra Smith  Patient : 1940   MRN: [id number]  Reason for Admission: dizziness  Discharge Date: 21 RARS: No data recorded       Spoke with: na    Attempted to reach patient via phone for  transition call. VM left stating purpose of call along with my contact information requesting a return call. Ramona Lim RN   Care Transition Nurse  [phone]          Care Transitions Subsequent and Final Call    Subsequent and Final Calls  Care Transitions Interventions  Other Interventions:            Follow Up  Future Appointments   Date Time Provider Michael Chauhan   2021  9:15 AM Dawit Riddle PT [facility]   2021  1:40 PM Sarika Rosa MD [facility]   2021  9:00 AM Caleb Walters MD [facility]       Ramona Lim RN

## 2021-05-26 ENCOUNTER — CARE COORDINATION (OUTPATIENT)
Dept: CASE MANAGEMENT | Age: 81
End: 2021-05-26

## 2021-05-28 ENCOUNTER — HOSPITAL ENCOUNTER (OUTPATIENT)
Dept: PHYSICAL THERAPY | Age: 81
Setting detail: THERAPIES SERIES
Discharge: HOME | End: 2021-05-28
Payer: MEDICARE

## 2021-05-28 PROCEDURE — 97112 NEUROMUSCULAR REEDUCATION: CPT

## 2021-05-28 PROCEDURE — 97161 PT EVAL LOW COMPLEX 20 MIN: CPT

## 2021-05-28 NOTE — PROGRESS NOTES
Physical Therapy  Received prior authorization from [place] through [facility] for 6 PT visits from 6/1/2021-7/30/2021.  Order ID: Anders Orozco #: [id number]

## 2021-05-28 NOTE — FLOWSHEET NOTE
other medical complications  [] Other:     Goals:          Long term goals  Time Frame for Long term goals : 5 weeks  Long term goal 1: Pt will be independent with HEP  Long term goal 2: Pt will report 0 falls/near falls in 5 weeks  Long term goal 3: Pt will improve Modified CTSIB by 20 seconds or more to indicate improved balance  Long term goal 4: Pt will improve LEFS by 8 points or more to indicate significant change  Long term goal 5: Pt will ambulate without LOB or deviation     Plan: [] Continue per plan of care [] Alter current plan (see comments)   [x] Plan of care initiated [] Hold pending MD visit [] Discharge      Plan for Next Session:  Balance training    Re-Certification Due Date:         Signature:  Kristine Sharma, PT , PT

## 2021-05-28 NOTE — PROGRESS NOTES
Ryan  78. and Therapy, [facility], [facility], [address] [place] [address]  Phone: [phone]  Fax [phone]    Physical Therapy Vestibular Rehabilitation Evaluation    Date:  2021    Patient Name:  Anthony Zhu    :  1940  MRN: [id number]  Restrictions/Precautions:    Medical/Treatment Diagnosis Information:  Diagnosis: ataxia  Insurance/Certification information:  PT Insurance Information: [employer]  Physician Information:  Referring Practitioner: Catherine Eason MD    Time in:   9:15      Timed Treatment: 15 Total Treatment Time:  39  ______________________________________________________________________    SUBJECTIVE:      Referral Date: 2021  Onset Date: 2021  Chief Complaint: Had R knee replaced 2 years ago and L knee has had some shots, but they both still cause her some trouble. And she notes that her balance is still off. Her feet will feel like they are going to cramp up and she has to wake up, get up to walk it off in the middle of the night. Imbalance seems more from the feet up versus feeling \"off balance in her head\". Tried walking for exercise but then all her pains came back and she stopped. She is always very aware how she moves her feet or walk and staves off falls/imbalances because of being hyperaware. Also notes that she is chronically dehydrated. Setting in which symptoms first occurred: woke up at 5:30 and felt that she would fall over walking to the bathroom, didn't really feel dizzy or that the room was spinning, but that her entire sense of upright was off. Lasted about 2 hours and has not returned.      Description of symptoms: [] Vertigo [x]  Off-balance [] Lightheadedness  Symptoms are getting:  [x] Better [] Worse  [] Same [] Episodic  Description of Spells:  [] Constant [] Spontaneous [] Induced by motion [] Induced by position changes [x] Other:  Length of time spells occur: [] Seconds [] Minutes  [] Hours [x] Days [] Other:   What increases symptoms? Moving too quickly  What decreases symptoms? 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, mental health     OBJECTIVE:     Musculoskeletal Screen  Cervical spine complaints: denies  Cervical Pain: denies  Cervical spine ROM:  [x]  WNL [] Impaired:    LE ROM:    LEFT RIGHT    [facility] [place] [place] [place]    Knee [place] [place] [place]    Ankle [place] WFL     LE Strength:   LEFT RIGHT    Hip flexion [place] [place]    Hip IR [place] [place]    Hip ER [place] [place]    Knee extension [place]    Knee flexion [place]    Ankle [place] WFL     Posture: Forward head    Somatosensory:  Light touch:  [x] Normal [] Impaired Comments:    Coordination:  Rapid alternating movements: [x] Normal [] Dysdiadokinesia   Finger to Nose:   [x] Normal [] Dysmetric  Heel to Shin:    [x] Normal [] Ataxic    Postural Control Tests:  Clinical Test of Sensory Interaction for Balance (CTSIB) performed in Romberg stance  CONDITION TIME STRATEGY SWAY    Eyes open, firm surface 30 Ankle, hip mod    Eyes closed, firm surface 21 hip max    Eyes open, foam surface Unable       Eyes closed, foam surface unable       Tandem stance: unable B    Gait:    Assistive Device: N     Orthosis: N   At self-initiated pace:  Stella: slowed  ROSAURA: slightly wide        Arm swing: Y   Head/trunk rotation: decreased      Straight path:  Y  Swerves: N      Staggers: N   Side-steps: N   Gait speed: slowed    Oculomotor/Vestibular Examination:    Spontaneous nystagmus:  [] Left  [] Right [x] Absent  Gaze-Evoked nystagmus with fixation present:   Primary [] Present [x] Absent   Right  [] Present [x] Absent   Left  [] Present [x] Absent  VOR Head Thrust Test: [] Normal [x] Abnormal  Comments: slowed to the R with L beating nystagmus  VOR Cancellation:  [x] Normal [] Abnormal Comments:   Smooth Pursuit:  [x] Normal [] Abnormal Comments:   Saccades:   [x] Normal [] Abnormal Comments:   Convergence:   [] Normal [x] Abnormal Comments: 20 cm from nose    Positional Testing - not indicated from subjective history  R Hallpike-Ildefonso maneuver:    Nystagmus:  [] Yes  [] No  [] Duration:       [] Direction:    Vertigo:  [] Yes  [] No  [] Duration:   L Hallpike-[place] maneuver:   Nystagmus:  [] Yes  [] No  [] Duration:       [] Direction:    Vertigo:  [] Yes  [] No  [] Duration:   Supine roll head right:   Nystagmus:  [] Yes  [] No  [] Duration:       [] Direction:    Vertigo:  [] Yes  [] No  [] Duration:   Supine roll head left:   Nystagmus:  [] Yes  [] No  [] Duration:       [] Direction:    Vertigo:  [] Yes  [] No  [] Duration:     Outcome Measures  LEFS  41    ASSESSMENT:     Pt is an 78 y/o female who presents with impaired balance reactions, demonstrating increased reliance on stepping strategy versus ankle/hip strategy. She also demonstrates impaired head-impluse test, indicated decreased vestibular response to head movement. This could be contributing to her impaired sense of vertical upright. Recommend skilled, outpatient PT to address deficits and to attain below-stated functional goals.       Rehab Potential   [] Excellent [x] Good [] Fair  [] Poor  Problem List/Functional Limitations:   [] BPPV    [] Right [] Posteror Canal [] Canalithiasis    [] Left  [] Horizontal Canal [] Cupulolithiasis   [] Decreased Gaze Stabilization  [] Increased Motion Sensitivity   [x] Unilateral Vestibular Hypofunction  [] Bilateral Vestibular Hypofunction   [x] Gait Instability  [] Decreased tolerance for ADLs   [] Decreased ROM  [] Decreased Strength   [x] Decreased Balance [] Other:     Treatment/Activity Tolerance:   [x] Patient tolerated treatment well [] Patient limited by fatique  [] Patient limited by pain  [] Patient limited by other medical complications  [] Other:     Complexity:  [x] Low Complexity  [] Medium Complexity  [] High Complexity    Goals:           Long term goals  Time Frame for Long term goals : 5 weeks  Long term goal 1: Pt will be independent with HEP  Long term goal 2: Pt will report 0 falls/near falls in 5 weeks  Long term goal 3: Pt will improve Modified CTSIB by 20 seconds or more to indicate improved balance  Long term goal 4: Pt will improve LEFS by 8 points or more to indicate significant change  Long term goal 5: Pt will ambulate without LOB or deviation     G-Code (if applicable):              Treatment Plan: Today's Treatment:    [x] See flowsheet   [] Patient treated with canalith repositioning maneuver   [] Education materials provided on BPPV/Vestibular Dysfunction   [] Precautions provided and patient to follow precautions for next 24 hours in regards to BPPV management   [x] Written home exercise instructions   [] Other:    Plan: 1-2 x/week for 5 weeks   [x] Home Exercise Program  [] Canalith Repositioning Maneuvers   [x] Gaze Stabilization Exercises [] Habituation Exercises   [x] Neuromuscular Re-Education [x] Clinic-based Vestibular/Balance Therapy   [x] Patient Education   [] Other:   [x] Patient agrees with Plan of Care    Signature:  Rolo Menard, PT, PT, DPT [id number]      If you have any questions or concerns, please don't hesitate to call.   Thank you for your referral.    Physician Signature:________________________________Date:__________________  By signing above, therapists plan is approved by physician    Please return by fax to [phone]

## 2021-06-01 ENCOUNTER — TELEPHONE (OUTPATIENT)
Dept: INTERNAL MEDICINE CLINIC | Age: 81
End: 2021-06-01

## 2021-06-01 NOTE — TELEPHONE ENCOUNTER
----- Message from [facility] sent at 5/28/2021 10:21 AM EDT -----  Subject: Message to Provider    QUESTIONS  Information for Provider? Patient has been dealing with puss in the eye as   well as redness and being sore. Patient wants to be advised what to do and   where to go next. If any medication? Patient stated it started yesterday,   Thursday 05/27 around 5:00pm.   ---------------------------------------------------------------------------  --------------  CALL BACK INFO  What is the best way for the office to contact you? OK to leave message on   voicemail  Preferred Call Back Phone Number? [phone]  ---------------------------------------------------------------------------  --------------  SCRIPT ANSWERS  Relationship to Patient?  Self

## 2021-06-07 ENCOUNTER — VIRTUAL VISIT (OUTPATIENT)
Dept: INTERNAL MEDICINE CLINIC | Age: 81
End: 2021-06-07
Payer: MEDICARE

## 2021-06-07 DIAGNOSIS — H00.015 HORDEOLUM EXTERNUM LEFT LOWER EYELID: Primary | ICD-10-CM

## 2021-06-07 DIAGNOSIS — R42 DIZZINESS: ICD-10-CM

## 2021-06-07 DIAGNOSIS — K21.9 GASTROESOPHAGEAL REFLUX DISEASE WITHOUT ESOPHAGITIS: ICD-10-CM

## 2021-06-07 DIAGNOSIS — J30.89 ENVIRONMENTAL AND SEASONAL ALLERGIES: ICD-10-CM

## 2021-06-07 DIAGNOSIS — J45.30 MILD PERSISTENT ASTHMA WITHOUT COMPLICATION: ICD-10-CM

## 2021-06-07 PROCEDURE — 99214 OFFICE O/P EST MOD 30 MIN: CPT | Performed by: INTERNAL MEDICINE

## 2021-06-07 SDOH — ECONOMIC STABILITY: FOOD INSECURITY: WITHIN THE PAST 12 MONTHS, THE FOOD YOU BOUGHT JUST DIDN'T LAST AND YOU DIDN'T HAVE MONEY TO GET MORE.: NEVER TRUE

## 2021-06-07 SDOH — ECONOMIC STABILITY: FOOD INSECURITY: WITHIN THE PAST 12 MONTHS, YOU WORRIED THAT YOUR FOOD WOULD RUN OUT BEFORE YOU GOT MONEY TO BUY MORE.: NEVER TRUE

## 2021-06-07 ASSESSMENT — SOCIAL DETERMINANTS OF HEALTH (SDOH): HOW HARD IS IT FOR YOU TO PAY FOR THE VERY BASICS LIKE FOOD, HOUSING, MEDICAL CARE, AND HEATING?: NOT HARD AT ALL

## 2021-06-07 NOTE — PROGRESS NOTES
Date of Service:  6/7/2021    Chief Complaint:      Chief Complaint   Patient presents with    Follow-Up from [facility]     d/c 05/12/2021- Ataxia         HPI:  Arcadio Rojas is a 79 y.o. Pursuant to the emergency declaration under the [facility] waiver authority and the [employer] Act, this Virtual  Video Visit was conducted, with patient's consent, to reduce the patient's risk of exposure to COVID-19 and provide continuity of care. Service is  provided through a video synchronous discussion virtually to substitute for in-person clinic visit with the patient being at home and Dr. Colt Melendrez being at home. Patient consent to the video visit. She complain of left lower eyelid stye. It'sShe also got dizzy on 5/11 and told to the ER and had multiple tests that came back negative. She's no longer dizzy and started on PT. She's concern about being tired all the time recently. GERD:  resolve on Prilosec 20 mg prn about once every 10 days. Hiatal hernia:  Stable and asymptomatic.  Pt agree to seeing specialist at this time. Mild Asthma:  Stable on Advair 100/50 bid, singulair 10 mg qd and ventolin prn  Allergies:  Stable on Zyrtec 10 mg qd and Flonase 1 spray qd.     Lab Results   Component Value Date    LABA1C 5.5 07/06/2020    LABA1C 5.5 12/13/2019    LABA1C 5.8 04/01/2019    LABMICR YES 05/11/2021     Lab Results   Component Value Date     05/11/2021    K 4.4 05/11/2021     05/11/2021    CO2 28 05/11/2021    BUN 19 05/11/2021    CREATININE 0.6 05/11/2021    GLUCOSE 82 05/11/2021    CALCIUM 9.0 05/11/2021     Lab Results   Component Value Date    CHOL 141 05/12/2021    TRIG 62 05/12/2021    HDL 57 05/12/2021    LDLCALC 72 05/12/2021     Lab Results   Component Value Date    ALT 15 05/11/2021    AST 20 05/11/2021     Lab Results   Component Value Date    TSH 1.19 09/17/2015    T4FREE 1.1 09/17/2015 Lab Results   Component Value Date    WBC 5.1 05/11/2021    HGB 12.5 05/11/2021    HCT 39.0 05/11/2021    MCV 79.1 (L) 05/11/2021     05/11/2021     Lab Results   Component Value Date    INR 1.06 05/11/2021      No results found for: PSA   No results found for: [facility]     Patient Active Problem List   Diagnosis    Environmental and seasonal allergies    Mild persistent asthma without complication    Recurrent UTI    Hematuria    Numbness and tingling in right hand    Hammer toe of right foot    Arthritis of right knee    Status post total left knee replacement    Hiatal hernia    Gastroesophageal reflux disease without esophagitis    Ataxia       Allergies   Allergen Reactions    Oxycodone Hcl Nausea And Vomiting    Cephalexin      Vomiting    Oxycodone     Erythromycin Nausea And Vomiting     Outpatient Medications Marked as Taking for the 6/7/21 encounter (Virtual Visit) with Tram Rosa MD   Medication Sig Dispense Refill    aspirin 81 MG EC tablet Take 1 tablet by mouth daily 30 tablet 3    montelukast (SINGULAIR) 10 MG tablet Take 1 tablet by mouth nightly 30 tablet 11    ketoconazole (NIZORAL) 2 % shampoo Wash scalp 3 times per week. 1 Bottle 5    fluticasone-salmeterol (ADVAIR) 100-50 MCG/DOSE diskus inhaler INHALE ONE PUFF BY MOUTH EVERY 12 HOURS 60 each 11    fluticasone (FLONASE) 50 MCG/ACT nasal spray 1 spray by Each Nostril route 2 times daily 1 Bottle 2    albuterol sulfate HFA (VENTOLIN HFA) 108 (90 Base) MCG/ACT inhaler Inhale 2 puffs into the lungs every 4 hours as needed for Wheezing 1 Inhaler 5    fluocinonide (LIDEX) 0.05 % external solution Apply sparingly to affected area(s) of scalp qd prn flares. 60 mL 2    Naproxen Sodium (ALEVE) 220 MG CAPS Take by mouth daily as needed for Pain      Multiple Vitamins-Minerals (CENTRUM SILVER 50+WOMEN) TABS Take by mouth      Cetirizine HCl (ALL DAY ALLERGY PO) Take  by mouth daily.            Review of Systems: 14 systems were negative except of what was stated on HPI    Nursing note and vitals reviewed. There were no vitals filed for this visit. Wt Readings from Last 3 Encounters:   05/11/21 152 lb 3.2 oz (69 kg)   04/12/21 156 lb (70.8 kg)   12/11/20 145 lb (65.8 kg)     BP Readings from Last 3 Encounters:   05/12/21 (!) 148/79   04/12/21 132/84   03/22/21 (!) 153/85     There is no height or weight on file to calculate BMI. Constitutional: Patient appears well-developed and well-nourished. No distress. Head: Normocephalic and atraumatic. Skin: No rash or erythema. Psychiatric: Normal mood and affect. Behavior is normal.       Assessment/Plan:  Carrol Anthony was seen today for follow-up from hospital.    Diagnoses and all orders for this visit:    Hordeolum externum left lower eyelid  F/u with opthal    Dizziness  Continue PT    Gastroesophageal reflux disease without esophagitis  Stable and continue on current medications. Mild persistent asthma without complication  Stable and continue on current medications. Environmental and seasonal allergies  Stable and continue on current medications. Return Oct 4 at 10:30 Medicare Wellness and f/u.

## 2021-06-08 ENCOUNTER — TELEPHONE (OUTPATIENT)
Dept: FAMILY MEDICINE CLINIC | Age: 81
End: 2021-06-08

## 2021-06-08 NOTE — TELEPHONE ENCOUNTER
-Pt Last Seen on 6/7/21 with Dr. Colt Melendrez.  -Pt states Dr. Colt Melendrez suggested her to start taking   Vitamin D and Vitamin B-12 but needs to know what dosage?   Please Advise Call Back [phone]

## 2021-06-11 ENCOUNTER — HOSPITAL ENCOUNTER (OUTPATIENT)
Dept: PHYSICAL THERAPY | Age: 81
Setting detail: THERAPIES SERIES
Discharge: HOME | End: 2021-06-11
Payer: MEDICARE

## 2021-06-11 PROCEDURE — 97112 NEUROMUSCULAR REEDUCATION: CPT

## 2021-06-11 NOTE — FLOWSHEET NOTE
Test/Measurements:  See initial eval       ASSESSMENT:   Pt reported increased symptoms with each of the vestibular exercises this date targeted for vestibular hypofunction. Improving with rest. Progressed home exercise program. Pt verbalized improved understanding of purpose of vestibular rehab/exercises post session. Progress as pt tolerates.      Treatment/Activity Tolerance:   [x]Patient tolerated treatment well [] Patient limited by fatique  []Patient limited by pain [] Patient limited by other medical complications  [] Other:     Goals:          Long term goals  Time Frame for Long term goals : 5 weeks  Long term goal 1: Pt will be independent with HEP  Long term goal 2: Pt will report 0 falls/near falls in 5 weeks  Long term goal 3: Pt will improve Modified CTSIB by 20 seconds or more to indicate improved balance  Long term goal 4: Pt will improve LEFS by 8 points or more to indicate significant change  Long term goal 5: Pt will ambulate without LOB or deviation     Plan: [x] Continue per plan of care [] Alter current plan (see comments)   [] Plan of care initiated [] Hold pending MD visit [] Discharge      Plan for Next Session:  Balance training    Re-Certification Due Date:         Signature:  Zehra Tatum, PT , DPT

## 2021-06-14 ENCOUNTER — APPOINTMENT (OUTPATIENT)
Dept: PHYSICAL THERAPY | Age: 81
End: 2021-06-14
Payer: MEDICARE

## 2021-06-18 ENCOUNTER — APPOINTMENT (OUTPATIENT)
Dept: PHYSICAL THERAPY | Age: 81
End: 2021-06-18
Payer: MEDICARE

## 2021-06-21 ENCOUNTER — HOSPITAL ENCOUNTER (OUTPATIENT)
Dept: PHYSICAL THERAPY | Age: 81
Setting detail: THERAPIES SERIES
Discharge: HOME | End: 2021-06-21
Payer: MEDICARE

## 2021-06-21 PROCEDURE — 97112 NEUROMUSCULAR REEDUCATION: CPT

## 2021-06-21 NOTE — PROGRESS NOTES
Eyes open, foam surface 30 (Unable at eval) Ankle, hip mod    Eyes closed, foam surface 30 (unable at eval) Ankle, hip  max      Tandem stance: 10+ seconds B (unable B at eval)     Gait:               Assistive Device: N                                                     Orthosis: N              At self-initiated pace:  Stella: slowed                    ROSAURA: slightly wide                                                                     Arm swing: Y                           Head/trunk rotation: decreased                                                  Straight path: Y                       Swerves: N                                                  Staggers: N                             Side-steps: N              Gait speed: slowed     Oculomotor/Vestibular Examination:     Spontaneous nystagmus:       []? Left              []? Right           [x]? Absent  Gaze-Evoked nystagmus with fixation present:              Primary            []? Present       [x]? Absent              Right                []? Present       [x]? Absent              Left                  []? Present       [x]? Absent  VOR Head Thrust Test:          []? Normal        [x]? Abnormal    Comments: slowed to the R with L beating nystagmus  VOR Cancellation:                  [x]? Normal        []? Abnormal    Comments:   Smooth Pursuit:                      [x]? Normal        []? Abnormal    Comments:   Saccades:                               [x]? Normal        []? Abnormal    Comments:   Convergence:                          []? Normal        [x]? Abnormal    Comments: 20 cm from nose        ASSESSMENT: After 3 vestibular rehab visits, pt is demonstrating improved balance reactions, improving Modified CTSIB from 51 seconds at initial evaluation to 120 seconds this date. She has met 4 of 5 PT goals and would like to make today her last visit.      Treatment/Activity Tolerance:   [x]Patient tolerated treatment well [] Patient limited by farida  []Patient limited by pain [] Patient limited by other medical complications  [] Other:     Goals:          Long term goals  Time Frame for Long term goals : 5 weeks  Long term goal 1: Pt will be independent with HEP - met  Long term goal 2: Pt will report 0 falls/near falls in 5 weeks - met  Long term goal 3: Pt will improve Modified CTSIB by 20 seconds or more to indicate improved balance - met   Long term goal 4: Pt will improve LEFS by 8 points or more to indicate significant change - not met  Long term goal 5: Pt will ambulate without LOB or deviation - met    Plan: [] Continue per plan of care [] Alter current plan (see comments)   [] Plan of care initiated [] Hold pending MD visit [x] Discharge      Plan for Next Session:  Balance training    Re-Certification Due Date:         Signature:  Delia Wilkinson PT , DPT [id number]

## 2021-06-25 ENCOUNTER — APPOINTMENT (OUTPATIENT)
Dept: PHYSICAL THERAPY | Age: 81
End: 2021-06-25
Payer: MEDICARE

## 2021-06-28 ENCOUNTER — APPOINTMENT (OUTPATIENT)
Dept: PHYSICAL THERAPY | Age: 81
End: 2021-06-28
Payer: MEDICARE

## 2021-09-01 ENCOUNTER — TELEPHONE (OUTPATIENT)
Dept: INTERNAL MEDICINE CLINIC | Age: 81
End: 2021-09-01

## 2021-09-01 ENCOUNTER — VIRTUAL VISIT (OUTPATIENT)
Dept: INTERNAL MEDICINE CLINIC | Age: 81
End: 2021-09-01
Payer: MEDICARE

## 2021-09-01 DIAGNOSIS — N39.0 UTI (URINARY TRACT INFECTION), UNCOMPLICATED: Primary | ICD-10-CM

## 2021-09-01 PROCEDURE — 99421 OL DIG E/M SVC 5-10 MIN: CPT | Performed by: INTERNAL MEDICINE

## 2021-09-01 RX ORDER — SULFAMETHOXAZOLE AND TRIMETHOPRIM 800; 160 MG/1; MG/1
1 TABLET ORAL 2 TIMES DAILY
Qty: 10 TABLET | Refills: 0 | Status: SHIPPED | OUTPATIENT
Start: 2021-09-01 | End: 2021-09-06

## 2021-09-01 NOTE — PROGRESS NOTES
Date of Service:  9/1/2021    Chief Complaint:      Chief Complaint   Patient presents with    Urinary Frequency    Dysuria       Assessment/Plan:  Jose M Lujan was seen today for urinary frequency and dysuria. Diagnoses and all orders for this visit:    UTI (urinary tract infection), uncomplicated  -     sulfamethoxazole-trimethoprim (BACTRIM DS;SEPTRA DS) 800-160 MG per tablet; Take 1 tablet by mouth 2 times daily for 5 days        Return keep 10/4 Medicare Wellness. Stable and continue on current medications. Total Time: minutes: 5-10 minutes      HPI:  Stephanie Silvestre is a 79 y.o. Stephanie Silvestre is a 79 y.o. female evaluated via telephone on 9/1/2021 due to [employer] outbreak. Consent:  She and/or health care decision maker is aware that that she may receive a bill for this telephone service, depending on her insurance coverage, and has provided verbal consent to proceed: Yes    I AFFIRM/DO this is a Patient Initiated Episode with an Established Patient who has not had a related appointment within my department in the past 7 days or scheduled within the next 24 hours. She complain of dysuria, frequency, urgency that started 3 days ago. No flank pain, blood, fever or chills.         Lab Results   Component Value Date    LABA1C 5.5 07/06/2020    LABA1C 5.5 12/13/2019    LABA1C 5.8 04/01/2019    LABMICR YES 05/11/2021     Lab Results   Component Value Date     05/11/2021    K 4.4 05/11/2021     05/11/2021    CO2 28 05/11/2021    BUN 19 05/11/2021    CREATININE 0.6 05/11/2021    GLUCOSE 82 05/11/2021    CALCIUM 9.0 05/11/2021     Lab Results   Component Value Date    CHOL 141 05/12/2021    TRIG 62 05/12/2021    HDL 57 05/12/2021    LDLCALC 72 05/12/2021     Lab Results   Component Value Date    ALT 15 05/11/2021    AST 20 05/11/2021     Lab Results   Component Value Date    TSH 1.19 09/17/2015    T4FREE 1.1 09/17/2015     Lab Results   Component Value Date    WBC 5.1 05/11/2021    HGB 12.5 05/11/2021    HCT 39.0 05/11/2021    MCV 79.1 (L) 05/11/2021     05/11/2021     Lab Results   Component Value Date    INR 1.06 05/11/2021      No results found for: PSA   No results found for: [facility]     Patient Active Problem List   Diagnosis    Environmental and seasonal allergies    Mild persistent asthma without complication    Recurrent UTI    Hematuria    Numbness and tingling in right hand    Hammer toe of right foot    Arthritis of right knee    Status post total left knee replacement    Hiatal hernia    Gastroesophageal reflux disease without esophagitis    Ataxia       Allergies   Allergen Reactions    Oxycodone Hcl Nausea And Vomiting    Cephalexin      Vomiting    Oxycodone     Erythromycin Nausea And Vomiting     Outpatient Medications Marked as Taking for the 9/1/21 encounter (Virtual Visit) with Margy Rosa MD   Medication Sig Dispense Refill    aspirin 81 MG EC tablet Take 1 tablet by mouth daily 30 tablet 3    montelukast (SINGULAIR) 10 MG tablet Take 1 tablet by mouth nightly 30 tablet 11    ketoconazole (NIZORAL) 2 % shampoo Wash scalp 3 times per week. 1 Bottle 5    fluticasone-salmeterol (ADVAIR) 100-50 MCG/DOSE diskus inhaler INHALE ONE PUFF BY MOUTH EVERY 12 HOURS 60 each 11    fluticasone (FLONASE) 50 MCG/ACT nasal spray 1 spray by Each Nostril route 2 times daily 1 Bottle 2    albuterol sulfate HFA (VENTOLIN HFA) 108 (90 Base) MCG/ACT inhaler Inhale 2 puffs into the lungs every 4 hours as needed for Wheezing 1 Inhaler 5    fluocinonide (LIDEX) 0.05 % external solution Apply sparingly to affected area(s) of scalp qd prn flares. 60 mL 2    Naproxen Sodium (ALEVE) 220 MG CAPS Take by mouth daily as needed for Pain      Multiple Vitamins-Minerals (CENTRUM SILVER 50+WOMEN) TABS Take by mouth      Cetirizine HCl (ALL DAY ALLERGY PO) Take  by mouth daily.            Review of Systems: 14 systems were negative except of what was stated on HPI    Nursing note and vitals reviewed. There were no vitals filed for this visit. Wt Readings from Last 3 Encounters:   05/11/21 152 lb 3.2 oz (69 kg)   04/12/21 156 lb (70.8 kg)   12/11/20 145 lb (65.8 kg)     BP Readings from Last 3 Encounters:   05/12/21 (!) 148/79   04/12/21 132/84   03/22/21 (!) 153/85     There is no height or weight on file to calculate BMI. Constitutional: Patient sounded well and in no distress. Psychiatric: Normal mood on the phone.

## 2021-09-01 NOTE — TELEPHONE ENCOUNTER
----- Message from Octavio Valentin sent at 9/1/2021  2:10 PM EDT -----  Subject: Message to Provider    QUESTIONS  Information for Provider? patient of Dr. Bladimir Mcdonald is calling to request if   possible to get a prescription for medication for her possible uti, she   stated that she is having it once in a while, with burning sensation and   frequent urination, request to be sent to her [facility] pharmacy which she   always use. please call and advise.  ---------------------------------------------------------------------------  --------------  CALL BACK INFO  What is the best way for the office to contact you? OK to leave message on   voicemail  Preferred Call Back Phone Number? [phone]  ---------------------------------------------------------------------------  --------------  SCRIPT ANSWERS  Relationship to Patient?  Self

## 2021-09-27 ENCOUNTER — TELEPHONE (OUTPATIENT)
Dept: INTERNAL MEDICINE CLINIC | Age: 81
End: 2021-09-27

## 2021-09-27 ENCOUNTER — VIRTUAL VISIT (OUTPATIENT)
Dept: INTERNAL MEDICINE CLINIC | Age: 81
End: 2021-09-27
Payer: MEDICARE

## 2021-09-27 ENCOUNTER — NURSE TRIAGE (OUTPATIENT)
Dept: OTHER | Facility: CLINIC | Age: 81
End: 2021-09-27

## 2021-09-27 DIAGNOSIS — R53.83 OTHER FATIGUE: Primary | ICD-10-CM

## 2021-09-27 DIAGNOSIS — B34.9 ACUTE VIRAL SYNDROME: ICD-10-CM

## 2021-09-27 PROCEDURE — 99212 OFFICE O/P EST SF 10 MIN: CPT | Performed by: INTERNAL MEDICINE

## 2021-09-27 ASSESSMENT — LIFESTYLE VARIABLES
AUDIT TOTAL SCORE: 0
HOW OFTEN DURING THE LAST YEAR HAVE YOU BEEN UNABLE TO REMEMBER WHAT HAPPENED THE NIGHT BEFORE BECAUSE YOU HAD BEEN DRINKING: NEVER
HOW OFTEN DURING THE LAST YEAR HAVE YOU NEEDED AN ALCOHOLIC DRINK FIRST THING IN THE MORNING TO GET YOURSELF GOING AFTER A NIGHT OF HEAVY DRINKING: 0
HOW OFTEN DURING THE LAST YEAR HAVE YOU FOUND THAT YOU WERE NOT ABLE TO STOP DRINKING ONCE YOU HAD STARTED: NEVER
HOW OFTEN DURING THE LAST YEAR HAVE YOU HAD A FEELING OF GUILT OR REMORSE AFTER DRINKING: 0
HOW OFTEN DURING THE LAST YEAR HAVE YOU BEEN UNABLE TO REMEMBER WHAT HAPPENED THE NIGHT BEFORE BECAUSE YOU HAD BEEN DRINKING: 0
HOW OFTEN DO YOU HAVE A DRINK CONTAINING ALCOHOL: 1
HOW MANY STANDARD DRINKS CONTAINING ALCOHOL DO YOU HAVE ON A TYPICAL DAY: ONE OR TWO
HOW OFTEN DO YOU HAVE SIX OR MORE DRINKS ON ONE OCCASION: 0
HOW OFTEN DURING THE LAST YEAR HAVE YOU FAILED TO DO WHAT WAS NORMALLY EXPECTED FROM YOU BECAUSE OF DRINKING: 0
HOW OFTEN DURING THE LAST YEAR HAVE YOU FAILED TO DO WHAT WAS NORMALLY EXPECTED FROM YOU BECAUSE OF DRINKING: NEVER
HOW OFTEN DURING THE LAST YEAR HAVE YOU HAD A FEELING OF GUILT OR REMORSE AFTER DRINKING: NEVER
HAS A RELATIVE, FRIEND, DOCTOR, OR ANOTHER HEALTH PROFESSIONAL EXPRESSED CONCERN ABOUT YOUR DRINKING OR SUGGESTED YOU CUT DOWN: 0
HAVE YOU OR SOMEONE ELSE BEEN INJURED AS A RESULT OF YOUR DRINKING: 0
AUDIT-C TOTAL SCORE: 1
HOW OFTEN DURING THE LAST YEAR HAVE YOU FOUND THAT YOU WERE NOT ABLE TO STOP DRINKING ONCE YOU HAD STARTED: 0
AUDIT-C TOTAL SCORE: 0
HOW OFTEN DO YOU HAVE SIX OR MORE DRINKS ON ONE OCCASION: NEVER
HOW OFTEN DURING THE LAST YEAR HAVE YOU NEEDED AN ALCOHOLIC DRINK FIRST THING IN THE MORNING TO GET YOURSELF GOING AFTER A NIGHT OF HEAVY DRINKING: NEVER
HAS A RELATIVE, FRIEND, DOCTOR, OR ANOTHER HEALTH PROFESSIONAL EXPRESSED CONCERN ABOUT YOUR DRINKING OR SUGGESTED YOU CUT DOWN: NO
HAVE YOU OR SOMEONE ELSE BEEN INJURED AS A RESULT OF YOUR DRINKING: NO
HOW MANY STANDARD DRINKS CONTAINING ALCOHOL DO YOU HAVE ON A TYPICAL DAY: 0
HOW OFTEN DO YOU HAVE A DRINK CONTAINING ALCOHOL: MONTHLY OR LESS
AUDIT TOTAL SCORE: 1

## 2021-09-27 ASSESSMENT — PATIENT HEALTH QUESTIONNAIRE - PHQ9
SUM OF ALL RESPONSES TO PHQ QUESTIONS 1-9: 0
SUM OF ALL RESPONSES TO PHQ9 QUESTIONS 1 & 2: 0
SUM OF ALL RESPONSES TO PHQ QUESTIONS 1-9: 0
2. FEELING DOWN, DEPRESSED OR HOPELESS: 0
SUM OF ALL RESPONSES TO PHQ QUESTIONS 1-9: 0
1. LITTLE INTEREST OR PLEASURE IN DOING THINGS: 0

## 2021-09-27 NOTE — TELEPHONE ENCOUNTER
ECC/PSC. Please do not respond through this encounter as the response is not directed to a shared pool.

## 2021-09-27 NOTE — TELEPHONE ENCOUNTER
Talked to pt and she wants to go back to work and not want to move her physical sooner to Wed since already scheduled off to come in 10/4. She will use Tyl/advil/naproxen prn.

## 2021-10-04 ENCOUNTER — OFFICE VISIT (OUTPATIENT)
Dept: INTERNAL MEDICINE CLINIC | Age: 81
End: 2021-10-04
Payer: MEDICARE

## 2021-10-04 ENCOUNTER — HOSPITAL ENCOUNTER (OUTPATIENT)
Dept: GENERAL RADIOLOGY | Age: 81
Discharge: HOME | End: 2021-10-04
Payer: MEDICARE

## 2021-10-04 ENCOUNTER — HOSPITAL ENCOUNTER (OUTPATIENT)
Age: 81
Discharge: HOME | End: 2021-10-04
Payer: MEDICARE

## 2021-10-04 VITALS
BODY MASS INDEX: 26.68 KG/M2 | OXYGEN SATURATION: 97 % | HEIGHT: 62 IN | WEIGHT: 145 LBS | HEART RATE: 62 BPM | SYSTOLIC BLOOD PRESSURE: 120 MMHG | DIASTOLIC BLOOD PRESSURE: 70 MMHG

## 2021-10-04 DIAGNOSIS — K44.9 HIATAL HERNIA: ICD-10-CM

## 2021-10-04 DIAGNOSIS — R09.89 RESPIRATORY CRACKLES AT RIGHT LUNG BASE: ICD-10-CM

## 2021-10-04 DIAGNOSIS — R53.83 OTHER FATIGUE: ICD-10-CM

## 2021-10-04 DIAGNOSIS — J30.81 ALLERGIC RHINITIS DUE TO ANIMAL HAIR AND DANDER: ICD-10-CM

## 2021-10-04 DIAGNOSIS — J45.30 MILD PERSISTENT ASTHMA WITHOUT COMPLICATION: ICD-10-CM

## 2021-10-04 DIAGNOSIS — R09.82 POSTNASAL DRIP: ICD-10-CM

## 2021-10-04 DIAGNOSIS — R09.81 NASAL CONGESTION: ICD-10-CM

## 2021-10-04 DIAGNOSIS — Z00.00 ROUTINE GENERAL MEDICAL EXAMINATION AT A HEALTH CARE FACILITY: Primary | ICD-10-CM

## 2021-10-04 DIAGNOSIS — U07.1 PNEUMONIA DUE TO COVID-19 VIRUS: ICD-10-CM

## 2021-10-04 DIAGNOSIS — L21.9 SEBORRHEIC DERMATITIS: ICD-10-CM

## 2021-10-04 DIAGNOSIS — K21.9 GASTROESOPHAGEAL REFLUX DISEASE WITHOUT ESOPHAGITIS: ICD-10-CM

## 2021-10-04 DIAGNOSIS — J12.82 PNEUMONIA DUE TO COVID-19 VIRUS: ICD-10-CM

## 2021-10-04 DIAGNOSIS — J98.01 POST-INFECTION BRONCHOSPASM: ICD-10-CM

## 2021-10-04 LAB
A/G RATIO: 1.4 (ref 1.1–2.2)
ALBUMIN SERPL-MCNC: 4.1 G/DL (ref 3.4–5)
ALP BLD-CCNC: 85 U/L (ref 40–129)
ALT SERPL-CCNC: 35 U/L (ref 10–40)
ANION GAP SERPL CALCULATED.3IONS-SCNC: 16 MMOL/L (ref 3–16)
AST SERPL-CCNC: 48 U/L (ref 15–37)
BASOPHILS ABSOLUTE: 0 K/UL (ref 0–0.2)
BASOPHILS RELATIVE PERCENT: 0.3 %
BILIRUB SERPL-MCNC: <0.2 MG/DL (ref 0–1)
BUN BLDV-MCNC: 12 MG/DL (ref 7–20)
CALCIUM SERPL-MCNC: 9.2 MG/DL (ref 8.3–10.6)
CHLORIDE BLD-SCNC: 104 MMOL/L (ref 99–110)
CO2: 23 MMOL/L (ref 21–32)
CREAT SERPL-MCNC: 0.5 MG/DL (ref 0.6–1.2)
EOSINOPHILS ABSOLUTE: 0.1 K/UL (ref 0–0.6)
EOSINOPHILS RELATIVE PERCENT: 2.7 %
GFR AFRICAN AMERICAN: >60
GFR NON-AFRICAN AMERICAN: >60
GLOBULIN: 3 G/DL
GLUCOSE BLD-MCNC: 92 MG/DL (ref 70–99)
HCT VFR BLD CALC: 38.6 % (ref 36–48)
HEMOGLOBIN: 12.5 G/DL (ref 12–16)
LYMPHOCYTES ABSOLUTE: 1.4 K/UL (ref 1–5.1)
LYMPHOCYTES RELATIVE PERCENT: 36 %
MCH RBC QN AUTO: 26.1 PG (ref 26–34)
MCHC RBC AUTO-ENTMCNC: 32.4 G/DL (ref 31–36)
MCV RBC AUTO: 80.7 FL (ref 80–100)
MONOCYTES ABSOLUTE: 0.5 K/UL (ref 0–1.3)
MONOCYTES RELATIVE PERCENT: 12.8 %
NEUTROPHILS ABSOLUTE: 1.9 K/UL (ref 1.7–7.7)
NEUTROPHILS RELATIVE PERCENT: 48.2 %
PDW BLD-RTO: 15.8 % (ref 12.4–15.4)
PLATELET # BLD: 216 K/UL (ref 135–450)
PMV BLD AUTO: 9.6 FL (ref 5–10.5)
POTASSIUM SERPL-SCNC: 5.4 MMOL/L (ref 3.5–5.1)
RBC # BLD: 4.79 M/UL (ref 4–5.2)
SODIUM BLD-SCNC: 143 MMOL/L (ref 136–145)
TOTAL PROTEIN: 7.1 G/DL (ref 6.4–8.2)
TSH SERPL DL<=0.05 MIU/L-ACNC: 2.33 UIU/ML (ref 0.27–4.2)
VITAMIN B-12: 1342 PG/ML (ref 211–911)
VITAMIN D 25-HYDROXY: 29.7 NG/ML
WBC # BLD: 4 K/UL (ref 4–11)

## 2021-10-04 PROCEDURE — G0438 PPPS, INITIAL VISIT: HCPCS | Performed by: INTERNAL MEDICINE

## 2021-10-04 PROCEDURE — 36415 COLL VENOUS BLD VENIPUNCTURE: CPT | Performed by: INTERNAL MEDICINE

## 2021-10-04 PROCEDURE — 99214 OFFICE O/P EST MOD 30 MIN: CPT | Performed by: INTERNAL MEDICINE

## 2021-10-04 PROCEDURE — 71046 X-RAY EXAM CHEST 2 VIEWS: CPT

## 2021-10-04 RX ORDER — FLUOCINONIDE TOPICAL SOLUTION USP, 0.05% 0.5 MG/ML
SOLUTION TOPICAL
Qty: 60 ML | Refills: 2 | Status: SHIPPED | OUTPATIENT
Start: 2021-10-04 | End: 2022-04-15

## 2021-10-04 RX ORDER — ALBUTEROL SULFATE 90 UG/1
2 AEROSOL, METERED RESPIRATORY (INHALATION) EVERY 4 HOURS PRN
Qty: 1 EACH | Refills: 2 | Status: SHIPPED | OUTPATIENT
Start: 2021-10-04

## 2021-10-04 RX ORDER — KETOCONAZOLE 20 MG/ML
SHAMPOO TOPICAL
Qty: 100 ML | Refills: 5 | Status: SHIPPED | OUTPATIENT
Start: 2021-10-04 | End: 2022-04-15

## 2021-10-04 RX ORDER — OMEPRAZOLE 20 MG/1
20 CAPSULE, DELAYED RELEASE ORAL
Qty: 30 CAPSULE | Refills: 5 | Status: SHIPPED | OUTPATIENT
Start: 2021-10-04 | End: 2022-08-24

## 2021-10-04 RX ORDER — FLUTICASONE PROPIONATE 50 MCG
1 SPRAY, SUSPENSION (ML) NASAL 2 TIMES DAILY
Qty: 1 EACH | Refills: 2 | Status: SHIPPED | OUTPATIENT
Start: 2021-10-04

## 2021-10-04 NOTE — PROGRESS NOTES
Medicare Annual Wellness Visit  Name: Jeannie Palacios Date: 10/4/2021   MRN: [id number] Sex: Female   Age: 79 y.o. Ethnicity: Non- / Non    : 1940 Race: White (non-)      Lurdes Wagner is here for Medicare AWV, Gastroesophageal Reflux, and Asthma    Screenings for behavioral, psychosocial and functional/safety risks, and cognitive dysfunction are all negative except as indicated below. These results, as well as other patient data from the [address] form, are documented in Flowsheets linked to this Encounter. Allergies   Allergen Reactions    Oxycodone Hcl Nausea And Vomiting    Cephalexin      Vomiting    Oxycodone     Erythromycin Nausea And Vomiting       Prior to Visit Medications    Medication Sig Taking? Authorizing Provider   aspirin 81 MG EC tablet Take 1 tablet by mouth daily Yes NARESH Menard - CNP   montelukast (SINGULAIR) 10 MG tablet Take 1 tablet by mouth nightly Yes Ary Rosa MD   ketoconazole (NIZORAL) 2 % shampoo Wash scalp 3 times per week. Yes Brittany Bo MD   fluticasone-salmeterol (ADVAIR) 100-50 MCG/DOSE diskus inhaler INHALE ONE PUFF BY MOUTH EVERY 12 HOURS Yes Ary Rosa MD   fluticasone (FLONASE) 50 MCG/ACT nasal spray 1 spray by Each Nostril route 2 times daily Yes Loraine Brewer MD   albuterol sulfate HFA (VENTOLIN HFA) 108 (90 Base) MCG/ACT inhaler Inhale 2 puffs into the lungs every 4 hours as needed for Wheezing Yes Pratik Mckinney MD   fluocinonide (LIDEX) 0.05 % external solution Apply sparingly to affected area(s) of scalp qd prn flares. Yes Brittany Bo MD   Naproxen Sodium (ALEVE) 220 MG CAPS Take by mouth daily as needed for Pain Yes Historical Provider, MD   Multiple Vitamins-Minerals (CENTRUM SILVER 50+WOMEN) TABS Take by mouth Yes Historical Provider, MD   Cetirizine HCl (ALL DAY ALLERGY PO) Take  by mouth daily.  Yes Historical Provider, MD   omeprazole (PRILOSEC) 20 MG delayed release capsule Take 1 capsule by mouth every morning (before breakfast)  Angelique Rosa MD       Past Medical History:   Diagnosis Date    Allergic rhinitis     Arthritis     Asthma     mild    GERD (gastroesophageal reflux disease)     Hematuria, microscopic 02/2018    Knee joint disorder 2018    left knee injection x3    Knee pain, right     PONV (postoperative nausea and vomiting)     Reflux     Seasonal allergies     Tinnitus        Past Surgical History:   Procedure Laterality Date    CATARACT REMOVAL WITH IMPLANT Right 04/05/2019    CATARACT REMOVAL WITH IMPLANT Left 06/27/2019     PHACO EMULSIFICATION OF CATARACT WITH INTRAOCULAR LENS IMPLANT EYE    COLONOSCOPY  2009    COLONOSCOPY  9/17/14    polyp    CYSTOSCOPY  02/2018    DILATION AND CURETTAGE OF UTERUS      FOOT SURGERY Bilateral [id number]    rt ft 3rd digit proximalinterphalangeal joint arthrodesis/lt  ft 5th/2nd proximal interphalangeal joint arthrodesis    INTRACAPSULAR CATARACT EXTRACTION Right 4/5/2019    PHACO EMULSIFICATION OF CATARACT WITH  INTRAOCULAR LENS IMPLANT RIGHT EYE performed by Gerri López MD at [address] Left 6/27/2019    PHACO EMULSIFICATION OF CATARACT WITH INTRAOCULAR LENS IMPLANT EYE performed by Gerri López MD at [facility] Right 06/19/2018    right total knee replacement    KNEE ARTHROSCOPY      MOHS SURGERY  03/22/2021    TONSILLECTOMY      UPPER GASTROINTESTINAL ENDOSCOPY  3/15/13       Family History   Problem Relation Age of Onset    Heart Disease Mother     Arthritis Mother     Cancer Father     Arthritis Father     Arthritis Sister     Rheum Arthritis Brother     Arthritis Brother     Asthma Brother     Asthma Other     Arthritis Brother     Arthritis Brother     Arthritis Brother     Arthritis Brother     Asthma Son     Seizures Sister     Seizures Sister        CareTeam (Including outside providers/suppliers regularly involved in providing care): seatbelt when you are in a car?: Yes  Safety Interventions:  · Home safety tips provided     Personalized Preventive Plan   Current Health Maintenance Status  Immunization History   Administered Date(s) Administered    COVID-19, Moderna, PF, 100mcg/0.5mL 04/02/2021, 04/30/2021    Influenza Virus Vaccine 10/23/2016    Influenza, High Dose (Fluzone 65 yrs and older) 10/30/2017, 09/10/2018, 09/13/2021    Influenza, Triv, inactivated, subunit, adjuvanted, IM (Fluad 65 yrs and older) 09/13/2019    Pneumococcal Conjugate 13-valent (Pscyebf19) 11/11/2015    Pneumococcal Polysaccharide ([id number]) 08/21/2017, 01/22/2018    Tdap (Boostrix, Adacel) 06/12/2014    Zoster Live (Zostavax) 11/04/2012    Zoster Recombinant (Shingrix) 12/04/2020        Health Maintenance   Topic Date Due    Annual Wellness Visit (AWV)  09/29/2021    DTaP/Tdap/Td vaccine (2 - Td or Tdap) 06/12/2024    Colon cancer screen colonoscopy  09/17/2024    DEXA (modify frequency per FRAX score)  Completed    Flu vaccine  Completed    Shingles Vaccine  Completed    Pneumococcal 65+ years Vaccine  Completed    COVID-19 Vaccine  Completed    Hepatitis A vaccine  Aged Out    Hepatitis B vaccine  Aged Out    Hib vaccine  Aged Out    Meningococcal (ACWY) vaccine  Aged Out     Recommendations for [employer] Due: see orders and patient instructions/AVS.  . Recommended screening schedule for the next 5-10 years is provided to the patient in written form: see Patient Instructions/AVS.    Roby Hauser was seen today for medicare awv, gastroesophageal reflux and asthma.     Diagnoses and all orders for this visit:    Routine general medical examination at a health care facility  -     Full code

## 2021-10-04 NOTE — PATIENT INSTRUCTIONS
Personalized Preventive Plan for Yumiko Mooney - 10/4/2021  Medicare offers a range of preventive health benefits. Some of the tests and screenings are paid in full while other may be subject to a deductible, co-insurance, and/or copay. Some of these benefits include a comprehensive review of your medical history including lifestyle, illnesses that may run in your family, and various assessments and screenings as appropriate. After reviewing your medical record and screening and assessments performed today your provider may have ordered immunizations, labs, imaging, and/or referrals for you. A list of these orders (if applicable) as well as your Preventive Care list are included within your After Visit Summary for your review. Other Preventive Recommendations:    · A preventive eye exam performed by an eye specialist is recommended every 1-2 years to screen for glaucoma; cataracts, macular degeneration, and other eye disorders. · A preventive dental visit is recommended every 6 months. · Try to get at least 150 minutes of exercise per week or 10,000 steps per day on a pedometer . · Order or download the FREE \"Exercise & Physical Activity: Your Everyday Guide\" from [employer]. Call 8-966.490.3796 or search [employer] online. · You need 3295-8775 mg of calcium and 1062-6846 IU of vitamin D per day. It is possible to meet your calcium requirement with diet alone, but a vitamin D supplement is usually necessary to meet this goal.  · When exposed to the sun, use a sunscreen that protects against both UVA and UVB radiation with an SPF of 30 or greater. Reapply every 2 to 3 hours or after sweating, drying off with a towel, or swimming. · Always wear a seat belt when traveling in a car. Always wear a helmet when riding a bicycle or motorcycle.

## 2021-10-04 NOTE — PROGRESS NOTES
Kajal Diaz  YOB: 1940    Date of Service:  10/4/2021    Chief Complaint:      Chief Complaint   Patient presents with    Medicare AWV    Gastroesophageal Reflux    Asthma       Assessment/Plan:  Abisai Schuler was seen today for medicare awv, gastroesophageal reflux and asthma. Diagnoses and all orders for this visit:    Routine general medical examination at a health care facility  -     Full code    Other fatigue  -     Vitamin B12  -     Vitamin D 25 Hydroxy  -     CBC Auto Differential  -     Comprehensive Metabolic Panel  -     TSH without Reflex  -     XR CHEST (2 VW); Future    Respiratory crackles at right lung base  -     XR CHEST (2 VW); Future    Post-infection bronchospasm  Increase Advair bid and use albtuerol prn    Gastroesophageal reflux disease without esophagitis  Restart omeprazole (PRILOSEC) 20 MG delayed release capsule; Take 1 capsule by mouth every morning (before breakfast)    Hiatal hernia  -     omeprazole (PRILOSEC) 20 MG delayed release capsule; Take 1 capsule by mouth every morning (before breakfast)    Mild persistent asthma without complication  Increase fluticasone-salmeterol (ADVAIR) 100-50 MCG/DOSE diskus inhaler; INHALE ONE PUFF BY MOUTH EVERY 12 HOURS  -     albuterol sulfate HFA (VENTOLIN HFA) 108 (90 Base) MCG/ACT inhaler; Inhale 2 puffs into the lungs every 4 hours as needed for Wheezing    Allergic rhinitis due to animal hair and dander  -     fluticasone (FLONASE) 50 MCG/ACT nasal spray; 1 spray by Each Nostril route 2 times daily    Postnasal drip  -     fluticasone (FLONASE) 50 MCG/ACT nasal spray; 1 spray by Each Nostril route 2 times daily    Nasal congestion  -     fluticasone (FLONASE) 50 MCG/ACT nasal spray; 1 spray by Each Nostril route 2 times daily    Seborrheic dermatitis  -     ketoconazole (NIZORAL) 2 % shampoo; Wash scalp 3 times per week. -     fluocinonide (LIDEX) 0.05 % external solution;  Apply sparingly to affected area(s) of scalp qd prn Numbness and tingling in right hand    Hammer toe of right foot    Arthritis of right knee    Status post total left knee replacement    Hiatal hernia    Gastroesophageal reflux disease without esophagitis    Ataxia       Allergies   Allergen Reactions    Oxycodone Hcl Nausea And Vomiting    Cephalexin      Vomiting    Oxycodone     Erythromycin Nausea And Vomiting     Outpatient Medications Marked as Taking for the 10/4/21 encounter (Office Visit) with Sonali Rosa MD   Medication Sig Dispense Refill    aspirin 81 MG EC tablet Take 1 tablet by mouth daily 30 tablet 3    montelukast (SINGULAIR) 10 MG tablet Take 1 tablet by mouth nightly 30 tablet 11    ketoconazole (NIZORAL) 2 % shampoo Wash scalp 3 times per week. 1 Bottle 5    fluticasone-salmeterol (ADVAIR) 100-50 MCG/DOSE diskus inhaler INHALE ONE PUFF BY MOUTH EVERY 12 HOURS 60 each 11    fluticasone (FLONASE) 50 MCG/ACT nasal spray 1 spray by Each Nostril route 2 times daily 1 Bottle 2    albuterol sulfate HFA (VENTOLIN HFA) 108 (90 Base) MCG/ACT inhaler Inhale 2 puffs into the lungs every 4 hours as needed for Wheezing 1 Inhaler 5    fluocinonide (LIDEX) 0.05 % external solution Apply sparingly to affected area(s) of scalp qd prn flares. 60 mL 2    Naproxen Sodium (ALEVE) 220 MG CAPS Take by mouth daily as needed for Pain      Multiple Vitamins-Minerals (CENTRUM SILVER 50+WOMEN) TABS Take by mouth      Cetirizine HCl (ALL DAY ALLERGY PO) Take  by mouth daily. Review of Systems: 14 systems were negative except of what was stated on HPI    Nursing note and vitals reviewed.     Vitals:    10/04/21 1019   BP: 120/70   Pulse: 62   SpO2: 97%   Weight: 145 lb (65.8 kg)   Height: 5' 2\" (1.575 m)     Wt Readings from Last 3 Encounters:   10/04/21 145 lb (65.8 kg)   05/11/21 152 lb 3.2 oz (69 kg)   04/12/21 156 lb (70.8 kg)     BP Readings from Last 3 Encounters:   10/04/21 120/70   05/12/21 (!) 148/79   04/12/21 132/84     Body mass index is 26.52 kg/m². Constitutional: Patient appears well-developed and well-nourished. No distress. Head: Normocephalic and atraumatic. Neck: Normal range of motion. Neck supple. No thyroidmegaly. Cardiovascular: Normal rate, regular rhythm, normal heart sounds and intact distal pulses. Pulmonary/Chest: Effort normal and breath sounds normal. No stridor. No respiratory distress. No wheezes and no rales. Abdominal: Soft. Bowel sounds are normal. No distension and no mass. No tenderness. No rebound and no guarding. Musculoskeletal: No edema and no tenderness. Skin: No rash or erythema. Psychiatric: Normal mood and affect.  Behavior is normal.

## 2021-10-05 NOTE — RESULT ENCOUNTER NOTE
Inform patient:  Your thyroid, Vit B12, Vit D, sugar, kidneys, liver and blood count are normal.  You don not need to take extra Vit B12 once you run out, staying on a multivitamin should be fine.

## 2021-10-07 ENCOUNTER — VIRTUAL VISIT (OUTPATIENT)
Dept: INTERNAL MEDICINE CLINIC | Age: 81
End: 2021-10-07
Payer: MEDICARE

## 2021-10-07 ENCOUNTER — TELEPHONE (OUTPATIENT)
Dept: INTERNAL MEDICINE CLINIC | Age: 81
End: 2021-10-07

## 2021-10-07 DIAGNOSIS — B34.9 ACUTE VIRAL SYNDROME: Primary | ICD-10-CM

## 2021-10-07 DIAGNOSIS — K21.9 GASTROESOPHAGEAL REFLUX DISEASE WITHOUT ESOPHAGITIS: ICD-10-CM

## 2021-10-07 DIAGNOSIS — J45.30 MILD PERSISTENT ASTHMA WITHOUT COMPLICATION: ICD-10-CM

## 2021-10-07 PROCEDURE — 99212 OFFICE O/P EST SF 10 MIN: CPT | Performed by: INTERNAL MEDICINE

## 2021-10-07 NOTE — TELEPHONE ENCOUNTER
Patient was seen on Monday. Patient had xray done. Results showed she has covid pneumonia. She is staying home from work this week. Confused about her diagnosis. Scheduled pt with Dr. Vidhi Graff for virtual visit today.

## 2021-10-07 NOTE — LETTER
[address], [facility] [place] [address]  Phone: [phone]  Fax: [phone]    Shawn Yarbrough MD        October 7, 2021     Patient: Ricarda Aguayo   YOB: 1940   Date of Visit: 10/7/2021       To Whom It May Concern: It is my medical opinion that Tabitha Esquivel may return to work on October 12, 2021. If you have any questions or concerns, please don't hesitate to call. Sincerely,    .     Shawn Yarbrough MD

## 2021-10-07 NOTE — PROGRESS NOTES
Pursuant to the emergency declaration under the [address] [facility], [facility] waiver authority and the [employer] and [employer] Act, this Virtual  Video Visit was conducted, with patient's consent, to reduce the patient's risk of exposure to COVID-19 and provide continuity of care. Service is  provided through a video synchronous discussion virtually to substitute for in-person clinic visit with the patient being at home and Dr. Gogo Hollins being at home. Patient consent to the video visit. Date of Service:  10/7/2021    Chief Complaint:      Chief Complaint   Patient presents with    Pneumonia     discuss x ray results        Assessment/Plan:    Yuridia Hatch was seen today for pneumonia. Diagnoses and all orders for this visit:    Acute viral syndrome    Mild persistent asthma without complication    Gastroesophageal reflux disease without esophagitis    Stable and continue on current medications. Return March 1 at 2 VV Asthma, GERD. HPI:  Eddie Hdz is a 79 y.o. She's feeling better now and not as tired. No more coughing, fever or chills. She wants to go back to work Oct 12. GERD:  resolve on Prilosec 20 mg prn about once every 10 days. Hiatal hernia:  Stable and asymptomatic.  Pt agree to seeing specialist at this time. Mild Asthma:  Stable on Advair 100/50 bid, singulair 10 mg qd and ventolin prn  Allergies:  Stable on Zyrtec 10 mg qd and Flonase 1 spray qd.     Lab Results   Component Value Date    LABA1C 5.5 07/06/2020    LABA1C 5.5 12/13/2019    LABA1C 5.8 04/01/2019    LABMICR YES 05/11/2021     Lab Results   Component Value Date     10/04/2021    K 5.4 (H) 10/04/2021     10/04/2021    CO2 23 10/04/2021    BUN 12 10/04/2021    CREATININE 0.5 (L) 10/04/2021    GLUCOSE 92 10/04/2021    CALCIUM 9.2 10/04/2021     Lab Results   Component Value Date    CHOL 141 05/12/2021    TRIG 62 05/12/2021    HDL 57 05/12/2021 LDLCALC 72 05/12/2021     Lab Results   Component Value Date    ALT 35 10/04/2021    AST 48 (H) 10/04/2021     Lab Results   Component Value Date    TSH 2.33 10/04/2021    TSH 1.19 09/17/2015    T4FREE 1.1 09/17/2015     Lab Results   Component Value Date    WBC 4.0 10/04/2021    HGB 12.5 10/04/2021    HCT 38.6 10/04/2021    MCV 80.7 10/04/2021     10/04/2021     Lab Results   Component Value Date    INR 1.06 05/11/2021      No results found for: PSA   No results found for: [facility]     Patient Active Problem List   Diagnosis    Environmental and seasonal allergies    Mild persistent asthma without complication    Recurrent UTI    Hematuria    Numbness and tingling in right hand    Hammer toe of right foot    Arthritis of right knee    Status post total left knee replacement    Hiatal hernia    Gastroesophageal reflux disease without esophagitis    Ataxia    Seborrheic dermatitis       Allergies   Allergen Reactions    Oxycodone Hcl Nausea And Vomiting    Cephalexin      Vomiting    Oxycodone     Erythromycin Nausea And Vomiting     Outpatient Medications Marked as Taking for the 10/7/21 encounter (Virtual Visit) with Suzanne Rosa MD   Medication Sig Dispense Refill    omeprazole (PRILOSEC) 20 MG delayed release capsule Take 1 capsule by mouth every morning (before breakfast) 30 capsule 5    ketoconazole (NIZORAL) 2 % shampoo Wash scalp 3 times per week. 100 mL 5    fluticasone-salmeterol (ADVAIR) 100-50 MCG/DOSE diskus inhaler INHALE ONE PUFF BY MOUTH EVERY 12 HOURS 1 each 11    fluticasone (FLONASE) 50 MCG/ACT nasal spray 1 spray by Each Nostril route 2 times daily 1 each 2    albuterol sulfate HFA (VENTOLIN HFA) 108 (90 Base) MCG/ACT inhaler Inhale 2 puffs into the lungs every 4 hours as needed for Wheezing 1 each 2    fluocinonide (LIDEX) 0.05 % external solution Apply sparingly to affected area(s) of scalp qd prn flares.  60 mL 2    aspirin 81 MG EC tablet Take 1 tablet by mouth daily 30 tablet 3    montelukast (SINGULAIR) 10 MG tablet Take 1 tablet by mouth nightly 30 tablet 11    Naproxen Sodium (ALEVE) 220 MG CAPS Take by mouth daily as needed for Pain      Multiple Vitamins-Minerals (CENTRUM SILVER 50+WOMEN) TABS Take by mouth      Cetirizine HCl (ALL DAY ALLERGY PO) Take  by mouth daily. Review of Systems: 14 systems were negative except of what was stated on HPI    Nursing note and vitals reviewed. There were no vitals filed for this visit. Wt Readings from Last 3 Encounters:   10/04/21 145 lb (65.8 kg)   05/11/21 152 lb 3.2 oz (69 kg)   04/12/21 156 lb (70.8 kg)     BP Readings from Last 3 Encounters:   10/04/21 120/70   05/12/21 (!) 148/79   04/12/21 132/84     There is no height or weight on file to calculate BMI. Constitutional: Patient appears well-developed and well-nourished. No distress. Head: Normocephalic and atraumatic. Skin: No rash or erythema. Psychiatric: Normal mood and affect.  Behavior is normal.

## 2021-11-01 ENCOUNTER — TELEMEDICINE (OUTPATIENT)
Dept: INTERNAL MEDICINE CLINIC | Age: 81
End: 2021-11-01
Payer: MEDICARE

## 2021-11-01 DIAGNOSIS — F51.01 PRIMARY INSOMNIA: Primary | ICD-10-CM

## 2021-11-01 PROCEDURE — 99212 OFFICE O/P EST SF 10 MIN: CPT | Performed by: INTERNAL MEDICINE

## 2021-11-01 RX ORDER — TRAZODONE HYDROCHLORIDE 50 MG/1
TABLET ORAL
Qty: 30 TABLET | Refills: 0 | Status: SHIPPED | OUTPATIENT
Start: 2021-11-01 | End: 2021-11-22 | Stop reason: SDUPTHER

## 2021-11-01 NOTE — PROGRESS NOTES
Pursuant to the emergency declaration under the [address] [facility], [facility] waiver authority and the [employer] and [employer] Act, this Virtual  Video Visit was conducted, with patient's consent, to reduce the patient's risk of exposure to COVID-19 and provide continuity of care. Service is  provided through a video synchronous discussion virtually to substitute for in-person clinic visit with the patient being at home and Dr. Iona Montano being at home. Patient consent to the video visit. Date of Service:  11/1/2021    Chief Complaint:      Chief Complaint   Patient presents with    Insomnia       Assessment/Plan:    José Miguel Jimenez was seen today for insomnia. Diagnoses and all orders for this visit:    Primary insomnia  Start  traZODone (DESYREL) 50 MG tablet; 1/2-2 po qhs prn sleep      Return Nov 22 at 2:20 VV sleep. HPI:  Trish Prado is a 79 y.o. She complains of waking up a few times at night and only able to get 2-3 hrs of sleep at a time for many years. She's tried otc melatonin but not been on any meds  Currently.       Lab Results   Component Value Date    LABA1C 5.5 07/06/2020    LABA1C 5.5 12/13/2019    LABA1C 5.8 04/01/2019    LABMICR YES 05/11/2021     Lab Results   Component Value Date     10/04/2021    K 5.4 (H) 10/04/2021     10/04/2021    CO2 23 10/04/2021    BUN 12 10/04/2021    CREATININE 0.5 (L) 10/04/2021    GLUCOSE 92 10/04/2021    CALCIUM 9.2 10/04/2021     Lab Results   Component Value Date    CHOL 141 05/12/2021    TRIG 62 05/12/2021    HDL 57 05/12/2021    LDLCALC 72 05/12/2021     Lab Results   Component Value Date    ALT 35 10/04/2021    AST 48 (H) 10/04/2021     Lab Results   Component Value Date    TSH 2.33 10/04/2021    TSH 1.19 09/17/2015    T4FREE 1.1 09/17/2015     Lab Results   Component Value Date    WBC 4.0 10/04/2021    HGB 12.5 10/04/2021    HCT 38.6 10/04/2021    MCV 80.7 10/04/2021     10/04/2021     Lab Results   Component Value Date    INR 1.06 05/11/2021      No results found for: PSA   No results found for: [facility]     Patient Active Problem List   Diagnosis    Environmental and seasonal allergies    Mild persistent asthma without complication    Recurrent UTI    Hematuria    Numbness and tingling in right hand    Hammer toe of right foot    Arthritis of right knee    Status post total left knee replacement    Hiatal hernia    Gastroesophageal reflux disease without esophagitis    Ataxia    Seborrheic dermatitis       Allergies   Allergen Reactions    Oxycodone Hcl Nausea And Vomiting    Cephalexin      Vomiting    Oxycodone     Erythromycin Nausea And Vomiting     Outpatient Medications Marked as Taking for the 11/1/21 encounter (Telemedicine) with Ar Rosa MD   Medication Sig Dispense Refill    omeprazole (PRILOSEC) 20 MG delayed release capsule Take 1 capsule by mouth every morning (before breakfast) 30 capsule 5    ketoconazole (NIZORAL) 2 % shampoo Wash scalp 3 times per week. 100 mL 5    fluticasone-salmeterol (ADVAIR) 100-50 MCG/DOSE diskus inhaler INHALE ONE PUFF BY MOUTH EVERY 12 HOURS 1 each 11    fluticasone (FLONASE) 50 MCG/ACT nasal spray 1 spray by Each Nostril route 2 times daily 1 each 2    albuterol sulfate HFA (VENTOLIN HFA) 108 (90 Base) MCG/ACT inhaler Inhale 2 puffs into the lungs every 4 hours as needed for Wheezing 1 each 2    fluocinonide (LIDEX) 0.05 % external solution Apply sparingly to affected area(s) of scalp qd prn flares. 60 mL 2    aspirin 81 MG EC tablet Take 1 tablet by mouth daily 30 tablet 3    montelukast (SINGULAIR) 10 MG tablet Take 1 tablet by mouth nightly 30 tablet 11    Naproxen Sodium (ALEVE) 220 MG CAPS Take by mouth daily as needed for Pain      Multiple Vitamins-Minerals (CENTRUM SILVER 50+WOMEN) TABS Take by mouth      Cetirizine HCl (ALL DAY ALLERGY PO) Take  by mouth daily.            Review of Systems: 14 systems were negative except of what was stated on HPI    Nursing note and vitals reviewed. There were no vitals filed for this visit. Wt Readings from Last 3 Encounters:   10/04/21 145 lb (65.8 kg)   05/11/21 152 lb 3.2 oz (69 kg)   04/12/21 156 lb (70.8 kg)     BP Readings from Last 3 Encounters:   10/04/21 120/70   05/12/21 (!) 148/79   04/12/21 132/84     There is no height or weight on file to calculate BMI. Constitutional: Patient appears well-developed and well-nourished. No distress. Head: Normocephalic and atraumatic. Skin: No rash or erythema. Psychiatric: Normal mood and affect.  Behavior is normal.

## 2021-11-05 ENCOUNTER — TELEPHONE (OUTPATIENT)
Dept: DERMATOLOGY | Age: 81
End: 2021-11-05

## 2021-11-05 NOTE — TELEPHONE ENCOUNTER
Patient was a former patient of dr cano's patient says she had MOHS surgery done on face and was told to follow back up in 6 months. Patient says last time she spoke with our office she was referred to other dermatologist. 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

## 2021-11-22 ENCOUNTER — VIRTUAL VISIT (OUTPATIENT)
Dept: INTERNAL MEDICINE CLINIC | Age: 81
End: 2021-11-22
Payer: MEDICARE

## 2021-11-22 DIAGNOSIS — F51.01 PRIMARY INSOMNIA: ICD-10-CM

## 2021-11-22 PROCEDURE — 99212 OFFICE O/P EST SF 10 MIN: CPT | Performed by: INTERNAL MEDICINE

## 2021-11-22 RX ORDER — TRAZODONE HYDROCHLORIDE 50 MG/1
50 TABLET ORAL NIGHTLY
Qty: 30 TABLET | Refills: 2 | Status: SHIPPED | OUTPATIENT
Start: 2021-11-22 | End: 2022-02-15 | Stop reason: ALTCHOICE

## 2021-11-22 NOTE — PROGRESS NOTES
Pursuant to the emergency declaration under the [address] [facility], [facility] waiver authority and the [employer] and [employer] Act, this Virtual  Video Visit was conducted, with patient's consent, to reduce the patient's risk of exposure to COVID-19 and provide continuity of care. Service is  provided through a video synchronous discussion virtually to substitute for in-person clinic visit with the patient being at home and Dr. Aurelio Lai being at home. Patient consent to the video visit. Date of Service:  11/22/2021    Chief Complaint:      Chief Complaint   Patient presents with    Insomnia       Assessment/Plan:    Cassy Lawrence was seen today for insomnia. Diagnoses and all orders for this visit:    Primary insomnia  -     traZODone (DESYREL) 50 MG tablet; Take 1 tablet by mouth nightly    Stable and continue on current medications. Return move 3/1 to 2/28 at 1;40 VV sleep, asthma, allergies. HPI:  Willard Olszewski is a 79 y.o. Insomnia:  Improved to 6 hrs on Trazodone 50 mg 1/2 qhs since she doesn't go to bed till 11 PM and has to get up at 5:30 AM.  She's tried otc melatonin but not been on any meds.       Lab Results   Component Value Date    LABA1C 5.5 07/06/2020    LABA1C 5.5 12/13/2019    LABA1C 5.8 04/01/2019    LABMICR YES 05/11/2021     Lab Results   Component Value Date     10/04/2021    K 5.4 (H) 10/04/2021     10/04/2021    CO2 23 10/04/2021    BUN 12 10/04/2021    CREATININE 0.5 (L) 10/04/2021    GLUCOSE 92 10/04/2021    CALCIUM 9.2 10/04/2021     Lab Results   Component Value Date    CHOL 141 05/12/2021    TRIG 62 05/12/2021    HDL 57 05/12/2021    LDLCALC 72 05/12/2021     Lab Results   Component Value Date    ALT 35 10/04/2021    AST 48 (H) 10/04/2021     Lab Results   Component Value Date    TSH 2.33 10/04/2021    TSH 1.19 09/17/2015    T4FREE 1.1 09/17/2015     Lab Results   Component Value Date    WBC 4.0 10/04/2021    HGB 12.5 10/04/2021    HCT 38.6 10/04/2021    MCV 80.7 10/04/2021     10/04/2021     Lab Results   Component Value Date    INR 1.06 05/11/2021      No results found for: PSA   No results found for: [facility]     Patient Active Problem List   Diagnosis    Environmental and seasonal allergies    Mild persistent asthma without complication    Recurrent UTI    Hematuria    Numbness and tingling in right hand    Hammer toe of right foot    Arthritis of right knee    Status post total left knee replacement    Hiatal hernia    Gastroesophageal reflux disease without esophagitis    Ataxia    Seborrheic dermatitis       Allergies   Allergen Reactions    Oxycodone Hcl Nausea And Vomiting    Cephalexin      Vomiting    Oxycodone     Erythromycin Nausea And Vomiting     Outpatient Medications Marked as Taking for the 11/22/21 encounter (Virtual Visit) with Flavio Rosa MD   Medication Sig Dispense Refill    traZODone (DESYREL) 50 MG tablet 1/2-2 po qhs prn sleep 30 tablet 0    ketoconazole (NIZORAL) 2 % shampoo Wash scalp 3 times per week. 100 mL 5    fluticasone-salmeterol (ADVAIR) 100-50 MCG/DOSE diskus inhaler INHALE ONE PUFF BY MOUTH EVERY 12 HOURS 1 each 11    fluticasone (FLONASE) 50 MCG/ACT nasal spray 1 spray by Each Nostril route 2 times daily 1 each 2    albuterol sulfate HFA (VENTOLIN HFA) 108 (90 Base) MCG/ACT inhaler Inhale 2 puffs into the lungs every 4 hours as needed for Wheezing 1 each 2    fluocinonide (LIDEX) 0.05 % external solution Apply sparingly to affected area(s) of scalp qd prn flares. 60 mL 2    aspirin 81 MG EC tablet Take 1 tablet by mouth daily 30 tablet 3    montelukast (SINGULAIR) 10 MG tablet Take 1 tablet by mouth nightly 30 tablet 11    Naproxen Sodium (ALEVE) 220 MG CAPS Take by mouth daily as needed for Pain      Multiple Vitamins-Minerals (CENTRUM SILVER 50+WOMEN) TABS Take by mouth      Cetirizine HCl (ALL DAY ALLERGY PO) Take  by mouth daily. Review of Systems: 14 systems were negative except of what was stated on HPI    Nursing note and vitals reviewed. There were no vitals filed for this visit. Wt Readings from Last 3 Encounters:   10/04/21 145 lb (65.8 kg)   05/11/21 152 lb 3.2 oz (69 kg)   04/12/21 156 lb (70.8 kg)     BP Readings from Last 3 Encounters:   10/04/21 120/70   05/12/21 (!) 148/79   04/12/21 132/84     There is no height or weight on file to calculate BMI. Constitutional: Patient appears well-developed and well-nourished. No distress. Head: Normocephalic and atraumatic. Skin: No rash or erythema. Psychiatric: Normal mood and affect.  Behavior is normal.

## 2022-01-03 ENCOUNTER — TELEPHONE (OUTPATIENT)
Dept: INTERNAL MEDICINE CLINIC | Age: 82
End: 2022-01-03

## 2022-01-03 NOTE — TELEPHONE ENCOUNTER
Please schedule patient for a visit to discuss. If she has virtual capabilities she can do a virtual visit. Thanks!

## 2022-01-03 NOTE — TELEPHONE ENCOUNTER
Called patient to schedule. Patient stated she took an aleve yesterday and it seemed to help some. She is going to wait and see how it feels tomorrow and if she needs an appointment will call then.

## 2022-01-03 NOTE — TELEPHONE ENCOUNTER
----- Message from Jude Jaime sent at 1/3/2022 10:40 AM EST -----  Subject: Appointment Request    Reason for Call: Urgent (Patient Request) No Script    QUESTIONS  Type of Appointment? Established Patient  Reason for appointment request? No appointments available during search  Additional Information for Provider? Pt states that she is having dull   pain in her upper right arm that has started the last 3-4 days. Pt states   that it never goes away day or night.   ---------------------------------------------------------------------------  --------------  CALL BACK INFO  What is the best way for the office to contact you? OK to leave message on   voicemail  Preferred Call Back Phone Number? [phone]  ---------------------------------------------------------------------------  --------------  SCRIPT ANSWERS  Relationship to Patient? Self  (Is the patient requesting to see the provider for a procedure?)? No  (Is the patient requesting to see the provider urgently  today or   tomorrow. )? Yes  Have you been diagnosed with, awaiting test results for, or told that you   are suspected of having COVID-19 (Coronavirus)? (If patient has tested   negative or was tested as a requirement for work, school, or travel and   not based on symptoms, answer no)? No  Within the past two weeks have you developed any of the following symptoms   (answer no if symptoms have been present longer than 2 weeks or began   more than 2 weeks ago)? Fever or Chills, Cough, Shortness of breath or   difficulty breathing, Loss of taste or smell, Sore throat, Nasal   congestion, Sneezing or runny nose, Fatigue or generalized body aches   (answer no if pain is specific to a body part e.g. back pain), Diarrhea,   Headache? No  Have you had close contact with someone with COVID-19 in the last 14 days? No  (Service Expert  click yes below to proceed with [employer] As Usual   Scheduling)?  Yes

## 2022-01-04 ENCOUNTER — VIRTUAL VISIT (OUTPATIENT)
Dept: INTERNAL MEDICINE CLINIC | Age: 82
End: 2022-01-04
Payer: MEDICARE

## 2022-01-04 DIAGNOSIS — M79.601 RIGHT ARM PAIN: Primary | ICD-10-CM

## 2022-01-04 PROCEDURE — 99212 OFFICE O/P EST SF 10 MIN: CPT | Performed by: INTERNAL MEDICINE

## 2022-01-04 RX ORDER — MELOXICAM 15 MG/1
15 TABLET ORAL DAILY
Qty: 30 TABLET | Refills: 0 | Status: SHIPPED | OUTPATIENT
Start: 2022-01-04 | End: 2022-08-15

## 2022-01-04 NOTE — PROGRESS NOTES
Pursuant to the emergency declaration under the [address] [facility] waiver authority and the [employer] Act, this Virtual  Video Visit was conducted, with patient's consent, to reduce the patient's risk of exposure to COVID-19 and provide continuity of care. Service is  provided through a video synchronous discussion virtually to substitute for in-person clinic visit with the patient being at home and Dr. Turner So being at home. Patient consent to the video visit. Date of Service:  1/4/2022    Chief Complaint:      Chief Complaint   Patient presents with    Arm Pain     Rt upper arm pain onset about 3 days ago, taking 1 Aleve per day for 3 days, slight improvement        Assessment/Plan:    Dennis Martínez was seen today for arm pain. Diagnoses and all orders for this visit:    Right arm pain  Start meloxicam (MOBIC) 15 MG tablet; Take 1 tablet by mouth daily    Return if symptoms worsen or fail to improve. HPI:  Thuy Trammell is a 79 y.o. She complain of right upper arm pain for 3 days, no injuries. She has not injured herself. Not sure what she did. Taking 1 Aleve as needed helps.     Lab Results   Component Value Date    LABA1C 5.5 07/06/2020    LABA1C 5.5 12/13/2019    LABA1C 5.8 04/01/2019    LABMICR YES 05/11/2021     Lab Results   Component Value Date     10/04/2021    K 5.4 (H) 10/04/2021     10/04/2021    CO2 23 10/04/2021    BUN 12 10/04/2021    CREATININE 0.5 (L) 10/04/2021    GLUCOSE 92 10/04/2021    CALCIUM 9.2 10/04/2021     Lab Results   Component Value Date    CHOL 141 05/12/2021    TRIG 62 05/12/2021    HDL 57 05/12/2021    LDLCALC 72 05/12/2021     Lab Results   Component Value Date    ALT 35 10/04/2021    AST 48 (H) 10/04/2021     Lab Results   Component Value Date    TSH 2.33 10/04/2021    TSH 1.19 09/17/2015    T4FREE 1.1 09/17/2015     Lab Results   Component Value Date    WBC 4.0 10/04/2021    HGB 12.5 10/04/2021    HCT 38.6 10/04/2021    MCV 80.7 10/04/2021     10/04/2021     Lab Results   Component Value Date    INR 1.06 05/11/2021      No results found for: PSA   No results found for: [facility]     Patient Active Problem List   Diagnosis    Environmental and seasonal allergies    Mild persistent asthma without complication    Recurrent UTI    Hematuria    Numbness and tingling in right hand    Hammer toe of right foot    Arthritis of right knee    Status post total left knee replacement    Hiatal hernia    Gastroesophageal reflux disease without esophagitis    Ataxia    Seborrheic dermatitis       Allergies   Allergen Reactions    Oxycodone Hcl Nausea And Vomiting    Cephalexin      Vomiting    Oxycodone     Erythromycin Nausea And Vomiting     Outpatient Medications Marked as Taking for the 1/4/22 encounter (Virtual Visit) with Thai Rosa MD   Medication Sig Dispense Refill    traZODone (DESYREL) 50 MG tablet Take 1 tablet by mouth nightly 30 tablet 2    ketoconazole (NIZORAL) 2 % shampoo Wash scalp 3 times per week. 100 mL 5    fluticasone-salmeterol (ADVAIR) 100-50 MCG/DOSE diskus inhaler INHALE ONE PUFF BY MOUTH EVERY 12 HOURS 1 each 11    fluticasone (FLONASE) 50 MCG/ACT nasal spray 1 spray by Each Nostril route 2 times daily 1 each 2    albuterol sulfate HFA (VENTOLIN HFA) 108 (90 Base) MCG/ACT inhaler Inhale 2 puffs into the lungs every 4 hours as needed for Wheezing 1 each 2    fluocinonide (LIDEX) 0.05 % external solution Apply sparingly to affected area(s) of scalp qd prn flares.  60 mL 2    aspirin 81 MG EC tablet Take 1 tablet by mouth daily 30 tablet 3    montelukast (SINGULAIR) 10 MG tablet Take 1 tablet by mouth nightly 30 tablet 11    Naproxen Sodium (ALEVE) 220 MG CAPS Take by mouth daily as needed for Pain      Multiple Vitamins-Minerals (CENTRUM SILVER 50+WOMEN) TABS Take by mouth      Cetirizine HCl (ALL DAY ALLERGY PO) Take  by mouth daily.           Review of Systems: 14 systems were negative except of what was stated on HPI    Nursing note and vitals reviewed. There were no vitals filed for this visit. Wt Readings from Last 3 Encounters:   10/04/21 145 lb (65.8 kg)   05/11/21 152 lb 3.2 oz (69 kg)   04/12/21 156 lb (70.8 kg)     BP Readings from Last 3 Encounters:   10/04/21 120/70   05/12/21 (!) 148/79   04/12/21 132/84     There is no height or weight on file to calculate BMI. Constitutional: Patient appears well-developed and well-nourished. No distress. Head: Normocephalic and atraumatic. Skin: No rash or erythema. Psychiatric: Normal mood and affect.  Behavior is normal.

## 2022-02-15 ENCOUNTER — OFFICE VISIT (OUTPATIENT)
Dept: DERMATOLOGY | Age: 82
End: 2022-02-15
Payer: MEDICARE

## 2022-02-15 VITALS — TEMPERATURE: 98.1 F

## 2022-02-15 DIAGNOSIS — L82.1 SEBORRHEIC KERATOSIS: ICD-10-CM

## 2022-02-15 DIAGNOSIS — L82.0 INFLAMED SEBORRHEIC KERATOSIS: ICD-10-CM

## 2022-02-15 DIAGNOSIS — L21.8 OTHER SEBORRHEIC DERMATITIS: ICD-10-CM

## 2022-02-15 DIAGNOSIS — Z85.828 HISTORY OF NONMELANOMA SKIN CANCER: ICD-10-CM

## 2022-02-15 DIAGNOSIS — L81.4 SOLAR LENTIGINOSIS: ICD-10-CM

## 2022-02-15 DIAGNOSIS — D18.01 CHERRY ANGIOMA: ICD-10-CM

## 2022-02-15 DIAGNOSIS — D48.5 NEOPLASM OF UNCERTAIN BEHAVIOR OF SKIN: Primary | ICD-10-CM

## 2022-02-15 DIAGNOSIS — L57.8 ACTINODERMATOSIS: ICD-10-CM

## 2022-02-15 DIAGNOSIS — L85.3 XEROSIS CUTIS: ICD-10-CM

## 2022-02-15 PROCEDURE — 11103 TANGNTL BX SKIN EA SEP/ADDL: CPT | Performed by: DERMATOLOGY

## 2022-02-15 PROCEDURE — 99214 OFFICE O/P EST MOD 30 MIN: CPT | Performed by: DERMATOLOGY

## 2022-02-15 PROCEDURE — 17110 DESTRUCTION B9 LES UP TO 14: CPT | Performed by: DERMATOLOGY

## 2022-02-15 PROCEDURE — 11102 TANGNTL BX SKIN SINGLE LES: CPT | Performed by: DERMATOLOGY

## 2022-02-15 RX ORDER — KETOCONAZOLE 20 MG/ML
SHAMPOO TOPICAL
Qty: 120 ML | Refills: 11 | Status: SHIPPED | OUTPATIENT
Start: 2022-02-15

## 2022-02-15 RX ORDER — FLUOCINONIDE TOPICAL SOLUTION USP, 0.05% 0.5 MG/ML
SOLUTION TOPICAL
Qty: 60 ML | Refills: 3 | Status: SHIPPED | OUTPATIENT
Start: 2022-02-15 | End: 2022-04-15 | Stop reason: SDUPTHER

## 2022-02-15 NOTE — PROGRESS NOTES
Patient's Name: Lilibeth Verdugo  MRN: [id number]  YOB: 1940  Date of Visit: 2/15/2022  Primary Care Provider: Alexander Abdi MD  Referring Provider: No ref. provider found    Subjective:     Chief Complaint   Patient presents with    New Patient    Skin Exam     Patient has rough spot on her neck and back. History of Present Illness:  Lilibeth Verdugo an 79 y.o. female with h/o multiple NMSC presents in clinic today as a new patient to my clinic, but has been previously evaluated by Dr. Ludin Gao last on 2/8/21. She presents today for a TBSE and evaluation of a lesion on neck and scalp condition. She is concerned about the following spot/s that she would like checked:    What is it: growth  Signs and symptoms: increasing diameter, rough and scaly  Location: Lt neck  Modifying factors: Things that make this condition worse are none. Things that make this condition better are none. Duration: This lesion has been present for >1 year. Current treatment: None  Previous treatment: None    She reports the back of her scalp being pruritic and scaling. She has been using the ketoconazole shampoo prescribed by Dr. Ludin Gao. However it is not helping. She denies using any topical steroids.         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  Personal history of melanoma: No  Personal history of abnormal/dysplastic moles: No  Personal history of tanning bed use current: No  Personal history of tanning bed use: No  Personal history of blistering sunburns: Yes  Personal history of extensive sun exposure: Yes  Burns easily: Yes  Practicing sun protective habits:  No     Social History:   Occupation Current or Former: full time job as clinical    Marital status:   Smoking Status: never  Children: Yes 2 Sons  2 Daughters Type of outdoor activities if any : None      Family Skin Disease History:  Patient denies  a family history of non melanoma skin cancer. Patient denies  a family history of abnormal moles  Patient denies  an immediate family history of melanoma.      Past Medical History:  Past Medical History:   Diagnosis Date    Allergic rhinitis     Arthritis     Asthma     mild    GERD (gastroesophageal reflux disease)     Hematuria, microscopic 02/2018    Knee joint disorder 2018    left knee injection x3    Knee pain, right     PONV (postoperative nausea and vomiting)     Reflux     Seasonal allergies     Tinnitus        Past Surgical History:  Past Surgical History:   Procedure Laterality Date    CATARACT REMOVAL WITH IMPLANT Right 04/05/2019    CATARACT REMOVAL WITH IMPLANT Left 06/27/2019     PHACO EMULSIFICATION OF CATARACT WITH INTRAOCULAR LENS IMPLANT EYE    COLONOSCOPY  2009    COLONOSCOPY  9/17/14    polyp    CYSTOSCOPY  02/2018    DILATION AND CURETTAGE OF UTERUS      FOOT SURGERY Bilateral [id number]    rt ft 3rd digit proximalinterphalangeal joint arthrodesis/lt  ft 5th/2nd proximal interphalangeal joint arthrodesis    INTRACAPSULAR CATARACT EXTRACTION Right 4/5/2019    PHACO EMULSIFICATION OF CATARACT WITH  INTRAOCULAR LENS IMPLANT RIGHT EYE performed by Kareen Trinidad MD at [address] Left 6/27/2019    PHACO EMULSIFICATION OF CATARACT WITH INTRAOCULAR LENS IMPLANT EYE performed by Kareen Trinidad MD at [facility] Right 06/19/2018    right total knee replacement    KNEE ARTHROSCOPY      MOHS SURGERY  03/22/2021    TONSILLECTOMY      UPPER GASTROINTESTINAL ENDOSCOPY  3/15/13       Past Family History:  Family History   Problem Relation Age of Onset    Heart Disease Mother     Arthritis Mother     Cancer Father     Arthritis Father     Arthritis Sister     Rheum Arthritis Brother     Arthritis Brother     Asthma Brother     Asthma Other     Arthritis Brother     Arthritis Brother     Arthritis Brother     Arthritis Brother     Asthma Son     Seizures Sister     Seizures Sister        Allergies: Allergies   Allergen Reactions    Oxycodone Hcl Nausea And Vomiting    Cephalexin      Vomiting    Oxycodone     Erythromycin Nausea And Vomiting       Current Medications:  Current Outpatient Medications   Medication Sig Dispense Refill    ketoconazole (NIZORAL) 2 % shampoo Wash scalp 3 times per week. 100 mL 5    fluticasone-salmeterol (ADVAIR) 100-50 MCG/DOSE diskus inhaler INHALE ONE PUFF BY MOUTH EVERY 12 HOURS 1 each 11    fluticasone (FLONASE) 50 MCG/ACT nasal spray 1 spray by Each Nostril route 2 times daily 1 each 2    albuterol sulfate HFA (VENTOLIN HFA) 108 (90 Base) MCG/ACT inhaler Inhale 2 puffs into the lungs every 4 hours as needed for Wheezing 1 each 2    aspirin 81 MG EC tablet Take 1 tablet by mouth daily 30 tablet 3    Cetirizine HCl (ALL DAY ALLERGY PO) Take  by mouth daily.  meloxicam (MOBIC) 15 MG tablet Take 1 tablet by mouth daily 30 tablet 0    omeprazole (PRILOSEC) 20 MG delayed release capsule Take 1 capsule by mouth every morning (before breakfast) 30 capsule 5    fluocinonide (LIDEX) 0.05 % external solution Apply sparingly to affected area(s) of scalp qd prn flares. (Patient not taking: Reported on 2/15/2022) 60 mL 2    montelukast (SINGULAIR) 10 MG tablet Take 1 tablet by mouth nightly 30 tablet 11    Naproxen Sodium (ALEVE) 220 MG CAPS Take by mouth daily as needed for Pain (Patient not taking: Reported on 2/15/2022)      Multiple Vitamins-Minerals (CENTRUM SILVER 50+WOMEN) TABS Take by mouth (Patient not taking: Reported on 2/15/2022)       No current facility-administered medications for this visit. Review of Systems:  Constitutional: No fevers, chills or recent illness.    Skin: Skin:As per HPI AND otherwise no new, bleeding or symptomatic skin lesions      Objective:     Vitals: 02/15/22 1331   Temp: 98.1 °F (36.7 °C)     Physical Examination:  General: alert, comfortable, no apparent distress, well-appearing  Psych: alert, oriented and pleasant  Neuro: oriented to person, place, and time  Skin: Areas examined: head including face, lips, conjunctiva and lids, neck, hair/scalp, chest, including breasts and axilla, abdomen, back, buttocks, right upper extremity, left upper extremity, right lower extremity, left lower extremity, left hand, right hand, digits and nails, Right foot, Left foot, toe nails and groin      All areas examined were within normal limits except those listed below with the appropriate assessment and plan    Assessment and Plan (with relevant objective exam findings):     1. Neoplasm uncertain behavior, skin  Location: Lt upper neck  Objective findings: erythematous scaly thin plaque  Ddx: SCCIS vs AK vs seb derm  Biopsy today. See procedure note below. The specimen was sent to pathology for diagnosis. We will call patient or send note on Mychart with biopsy results as soon as they are available, and discuss treatment options as needed. Wound care was discussed and written instructions also given to patient. 2. Neoplasm uncertain behavior, skin  Location:Rt upper extensor arm  Objective findings: ill defined faintly erythematous plaque  Ddx: SCCIS vs nummular derm  Biopsy today. See procedure note below. The specimen was sent to pathology for diagnosis. We will call patient or send note on Mychart with biopsy results as soon as they are available, and discuss treatment options as needed. Wound care was discussed and written instructions also given to patient. 3. Other seborrheic dermatitis. Location: occipital scalp and conchal bowls  Objective: patches of scaling and erythema with small excoriations    I explained that this condition is likely caused in part by a yeast on the skin. It tends to be recurrent.  Long term treatment is likely needed to control the disease. After discussion of treatment options, risks, and alternatives, patient elected to treat with the following:       Ketoconazole 2% shampoo three times weekly (M, W, F) leave in 3-5 min, then rinse. May alternate with other shampoos  Lidex 0.05% solution to scalp and ears twice daily x 1-2 weeks as neded with flares. 4. Inflamed seborrheic keratosis   Location: Lt lateral eyebrow  Objective findings: Waxy, keratotic brown  papules with crusting and erythema    Symptoms: itching and rough     Due to irritation and symptoms this is causing as described the decision was made that this was medically necessary to treat today. Discussed treatment options including Cryotherapy, shave removal, ED&C, or laser treatment with patient. After discussing options, the decision was made to treat the ISK's with cryotherapy. See procedure note below. 5. Xerosis cutis    Diffuse dry, dull, rough skin with fine bran-like scale that flakes off easily located on trunk and bilateral extremities    -Patient counseled to use a gentle cleanser such as CeraVe or Dove  sensitive bar soap daily, do not take more than 1 shower daily with warm water and spend less than 10 minutes in shower/bath, pat dry and then apply thick moisturizer like OTC CeraVe cream in jar plus apply CeraVe twice daily. Avoid fragrances and dyes and use only fragrance free detergents. Sarna lotion and eucerin itch relief cream was provided        6. Cherry Hemangiomas  Location: Torso and extremities    Objective findings: Multiple bright red, dome-shaped papules       -Due to benign nature, no treatment is necessary. Reassurance and observation recommended.        7. History of nonmelanoma skin cancer  Location: Lt nasal bridge, Rt nasofacial sulcus, Rt proximal forearm  Objective findings: scars without signs of recurrence    Recommendation was made for sun avoidance, use of protective clothing and daily use of broad spectrum sunscreen containing avobenzone, titanium, or zinc with SPF 50 or higher and yearly full skin exams with a dermatologist.  Also instructed to do self skin checks each month, spouse/partner skin checks on birthdays, big holidays and anniversaries, and return to clinic sooner than a year if any new, changing, or concerning spots are identified. 8. Solar Lentiginosis    Location: sun exposed areas, most prominently on the dorsal hands, face, forearms, neck, shoulders, upper chest and upper back      Objective: Numerous lacy brown macules ranging in size from 2-10 mm diameter. The lentigines seen today are benign in character, caused by the sun, and do not require treatment. However, they do occasionally transform into a malignancy, so the patient needs to monitor for changes. They were educated on what a suspicious change would include, change in size or color, and included education on the ABCDs of melanoma. 9. Actinodermatosis  In all photo-exposed areas there were numerous light brown, lacy, 3-6 mm macules and some mottled hypo- and hyperpigmentation. This was most prominent on the face, anterior chest, and shoulders. The patient was reassured that these changes do not require treatment, but indicated a significant amount of sun damage in the patient's past. The patient was briefly counseled on the importance of sun protective habits and encouraged to be seen for follow up evaluations in the future. ABCDEs of melanoma were reviewed. 10. Seborrheic Keratosis  Location: Forehead, inframammary, infrapannus, back  Objective: Waxy, stuck on keratotic pink and brown  papules. -Reassurance provided to the patient regarding their chronic and benign nature. No treatment performed      - Written material on sunscreen provided. Follow up:  Return visit pending biopsy results or as needed for change in condition. All questions addressed.      Procedure:   Procedure:  (BIOPSY BY SHAVE treated with cryotherapy, using liquid nitrogen, for 2 freeze cycles of approximately 5 seconds each. Skin was allowed to thaw completely before each cycle. The patient was advised to have wound examined if problems with healing occur, or evidence of infection develops. Wound care instructions were reviewed with the patient and She was advised that if the site is not completely resolved at follow-up, re-treatment with this or other method should be considered. The patient tolerated the procedure well without complications.           Rui Angulo MD, MS

## 2022-02-15 NOTE — PATIENT INSTRUCTIONS
Wound care instructions    1. Keep bandage dry for 24 hrs. 2. After 24 hrs, clean with soap and water twice daily  3. Apply Vaseline or Aquaphor ointment twice daily and cover with small dressing or Band-Aid for 7-10 days. Studies show that wounds heal better when covered with ointment and a bandage. FREQUENTLY ASKED QUESTIONS:    Rocha It is OK to get the wound wet when washing or bathing.  If bleeding should occur, apply firm direct pressure for 20 minutes CONTINUOUSLY. After 20 minutes, you may check to see if bleeding has stopped. If bleeding persists, please repeat CONTINUOUS PRESSURE for another 20 minutes WITHOUT LOOKING. o If bleeding still persists, call the office at [phone]   Signs of infection include increased redness extending over 1 centimeter from the wound, increased warmth, yellow to green purulent (pus-like) discharge, and significantly increased tenderness to the touch. If you have any concerns regarding infection or develop fevers or chills, please call [phone]   You will be contacted with the results of your procedure when we receive them, usually within two weeks. Please call if you have not heard from us. Skin Cancer Prevention: After Your Visit    Skin cancer is the abnormal growth of cells in the skin. It usually appears as a growth that changes in color, shape, or size. This can be a sore that does not heal or a change in a wart or a mole. Skin cancer is almost always curable when found early and treated. So it is important to see your doctor if you have any of these changes in your skin. Skin cancer is the most common type of cancer. It often appears on areas of the body that have been exposed to the sun, such as the head, face, neck, back, chest, or shoulders. Follow-up care is a key part of your treatment and safety. Be sure to make and go to all appointments, and call your doctor if you are having problems.  It's also a good idea to know your test results and keep a list of the medicines you take. How can you care for yourself at home?  - Wear a wide-brimmed hat and long sleeves and pants if you are going to be outdoors for a long time. - Avoid the sun between 10 a.m. and 4 p.m., which is the peak time for UV rays. - Wear sunscreen on exposed skin. Make sure the sunscreen blocks ultraviolet rays (both UVA and UVB) and has a sun protection factor (SPF) of at least 30. Use it every day, even when it is cloudy. Some doctors may recommend a higher SPF, such as [address].  - Do not use tanning booths or sunlamps. - Use lip balm or cream that has sun protection factor (SPF) to protect your lips from getting sunburned or getting cold sores. - Wear sunglasses that block UV rays. When should you call for help? Watch closely for changes in your health, and be sure to contact your doctor if:  - You are concerned about any problem areas on your skin. - You notice a change in a mole or skin growth. For example:  a. It gets bigger. b. It develops uneven borders. c. It gets thicker, raised, or worn down. d. It changes color. e. It starts to bleed easily. Cryosurgery (Liquid Nitrogen Treatment): Aftercare instructions    Cryosurgery is the use of a cold, liquefied gas to remove superficial skin growths such as warts and keratoses. This liquefied gas is cooled to around 300? below 0? F. When it comes in contact with a skin growth, the growth is frozen and should fall away or shrink. What to expect:  After treatment, your skin will become red and swollen and may develop a blister or scab. Healing takes 1 to 3 weeks, after which the skin may look perfectly normal or may be slightly lighter in color. Sometimes the growth will not disappear completely and multiple treatments may be needed. This is especially true for warts. Proper care after the procedure:    Clean the treated area with soap and water as you normally would.  It is fine to get the area wet; however, be sure to blot it dry carefully and delicately.  If necessary, you can open the blister with a sterilized needle.  If a scab or small wound forms, apply a thin layer of Vaseline/Aquaphor ointment to the site twice daily.  If the site is prone to irritation, cover it with a bandage.  You will likely see a crust form after a few days. This crust will fall off on its own. Skin Cancer Prevention: After Your Visit    Skin cancer is the abnormal growth of cells in the skin. It usually appears as a growth that changes in color, shape, or size. This can be a sore that does not heal or a change in a wart or a mole. Skin cancer is almost always curable when found early and treated. So it is important to see your doctor if you have any of these changes in your skin. Skin cancer is the most common type of cancer. It often appears on areas of the body that have been exposed to the sun, such as the head, face, neck, back, chest, or shoulders. Follow-up care is a key part of your treatment and safety. Be sure to make and go to all appointments, and call your doctor if you are having problems. It's also a good idea to know your test results and keep a list of the medicines you take. How can you care for yourself at home?  - Wear a wide-brimmed hat and long sleeves and pants if you are going to be outdoors for a long time. - Avoid the sun between 10 a.m. and 4 p.m., which is the peak time for UV rays. - Wear sunscreen on exposed skin. Make sure the sunscreen blocks ultraviolet rays (both UVA and UVB) and has a sun protection factor (SPF) of at least 30. Use it every day, even when it is cloudy. Some doctors may recommend a higher SPF, such as 48.  - Do not use tanning booths or sunlamps. - Use lip balm or cream that has sun protection factor (SPF) to protect your lips from getting sunburned or getting cold sores. - Wear sunglasses that block UV rays. When should you call for help?   Watch closely for changes in your health, and be sure to contact your doctor if:  - You are concerned about any problem areas on your skin. - You notice a change in a mole or skin growth. For example:  f. It gets bigger. g. It develops uneven borders.  h. It gets thicker, raised, or worn down.  i. It changes color.  j. It starts to bleed easily. Dr. Keily Feltonia Jahaira Safe Strategies:    1. Avoid the sun if possible especially between the hours of 10 am and 4 pm. That's when the sun's most harmful UV rays are hitting the earth. 2. Use protective clothing. Just like the right equipment helps you perform better in your sport, the proper clothing can do a lot to help us in our fight to prevent skin cancer. Thicker and darker clothing with a tighter weave will block more of the suns harmful rays, and it never wears off! Make wearing long sleeves, a broad brimmed hat and sun glasses part of your routine. 3. Use a broad spectrum sunscreen with SPF 30 or higher on all your exposed skin. Make sure the sunscreen has either titanium dioxide, and/or zinc oxide (preferred), or Avobenzone in it (check the active ingredients on the back of the bottle to make sure). If you are going to be in the water or sweating, make sure the sunscreen you choose is water resistant. Better sunscreens are available in [place] ([facility]) and [facility] like Tinosorb S (Bemotrizinol) and Tinosorb M ([address] [place] [address]), so if you are serious about sun protection and visit those areas, feel free to stock up. 4. Reapply the sunscreen after 2 hours or after swimming, sweating, or toweling off. We don't care about the brand, but some good brands to use are Mckenzie Hermosillo MD , [facility], and really just about anything with the above ingredients. Cetaphil oil control moisturizer with SPF 30 or 50 is a good one for the face. It is not water resistant but goes on really light and won't make you break out. 5. Use enough.  One ounce of lotion, which is about the size of a golf ball, is the amount needed to cover the exposed parts of an average adult body. For sprays, apply until it \"glistens\" on skin (2 seconds of spray per arm, 4 seconds per leg, and 5 to 8 seconds each for the front torso and back). For sticks apply 3 to 4 passes back and forth per area. 6. Get regular check ups. Studies show that those who check their own skin every month, get checked by their spouse, partner, or significant other several times a year (we recommend you do this on your birthdays, anniversary, and big holidays), and get their yearly check by a board certified dermatologist have their skin cancers detected much earlier and have a much higher chance of cure than those who don't.

## 2022-02-17 ENCOUNTER — TELEPHONE (OUTPATIENT)
Dept: DERMATOLOGY | Age: 82
End: 2022-02-17

## 2022-02-17 LAB — DERMATOLOGY PATHOLOGY REPORT: ABNORMAL

## 2022-02-17 NOTE — TELEPHONE ENCOUNTER
Called and spoke with patient in regards to her biopsy results. Advised patient that bot site came back as superficial skin cancer. We will need to schedule patient for a ED&C procedure. Advised patient that I will call her back to schedule.

## 2022-02-23 DIAGNOSIS — J45.30 MILD PERSISTENT ASTHMA WITHOUT COMPLICATION: ICD-10-CM

## 2022-02-23 DIAGNOSIS — K44.9 HIATAL HERNIA: ICD-10-CM

## 2022-02-23 DIAGNOSIS — K21.9 GASTROESOPHAGEAL REFLUX DISEASE WITHOUT ESOPHAGITIS: ICD-10-CM

## 2022-02-23 DIAGNOSIS — J30.89 ENVIRONMENTAL AND SEASONAL ALLERGIES: ICD-10-CM

## 2022-02-23 RX ORDER — MONTELUKAST SODIUM 10 MG/1
10 TABLET ORAL NIGHTLY
Qty: 30 TABLET | Refills: 7 | Status: SHIPPED | OUTPATIENT
Start: 2022-02-23 | End: 2022-08-24

## 2022-02-23 NOTE — TELEPHONE ENCOUNTER
Patient called requesting refill for Montelukast. She states that pharmacy tells her there are no more refills. Checked with pharmacy they confirmed no refills on file. Patient has follow up on 02/28/2022. She is unable to move her appointment sooner due to work. rx pended.      LAST REFILL 04/12/2021  AMOUNT 30     11 REFILLS  LAST VISIT 01/04/2022  NEXT VISIT 02/28/2022

## 2022-02-28 ENCOUNTER — TELEMEDICINE (OUTPATIENT)
Dept: INTERNAL MEDICINE CLINIC | Age: 82
End: 2022-02-28
Payer: MEDICARE

## 2022-02-28 DIAGNOSIS — F51.01 PRIMARY INSOMNIA: ICD-10-CM

## 2022-02-28 DIAGNOSIS — J30.89 ENVIRONMENTAL AND SEASONAL ALLERGIES: ICD-10-CM

## 2022-02-28 DIAGNOSIS — J45.30 MILD PERSISTENT ASTHMA WITHOUT COMPLICATION: Primary | ICD-10-CM

## 2022-02-28 DIAGNOSIS — K21.9 GASTROESOPHAGEAL REFLUX DISEASE WITHOUT ESOPHAGITIS: ICD-10-CM

## 2022-02-28 PROCEDURE — 99214 OFFICE O/P EST MOD 30 MIN: CPT | Performed by: INTERNAL MEDICINE

## 2022-02-28 NOTE — PROGRESS NOTES
Pursuant to the emergency declaration under the [facility], [facility] waiver authority and the [employer] and [employer] Act, this Virtual  Video Visit was conducted, with patient's consent, to reduce the patient's risk of exposure to COVID-19 and provide continuity of care. Service is  provided through a video synchronous discussion virtually to substitute for in-person clinic visit with the patient being at home and Dr. Yee Cuello being at home. Patient consent to the video visit. Date of Service:  2/28/2022    Chief Complaint:      Chief Complaint   Patient presents with    Insomnia    Asthma    Allergies       Assessment/Plan:    Jurgen Aaron was seen today for insomnia, asthma and allergies. Diagnoses and all orders for this visit:    Mild persistent asthma without complication    Environmental and seasonal allergies    Gastroesophageal reflux disease without esophagitis    Primary insomnia    Stable and continue on current medications. Return Oct 10 at 7:50 Fasting Medicare Wellness and f/u. HPI:  Phillip Mcdonough is a 79 y.o. She complain of mild hoarseness and postnasal drainage since have Covid 19 pneumonia back in October 2021. She is not using her Flonase regularly and not drinking water after using her Advair but she does rinse her mouth after use. Mild Asthma:  Stable on Advair 100/50 bid, singulair 10 mg qd and ventolin prn  Allergies:  Stable on Zyrtec 10 mg qd and Flonase 1-2 spray prn. GERD:  resolve on Prilosec 20 mg prn about once every 10 days. Hiatal hernia:  Stable and asymptomatic.  Pt agree to seeing specialist at this time.   Insomnia:  Improved to 6 hrs on Trazodone 50 mg 1/2 evening prn.  She's tried otc melatonin but not been on any meds.      Lab Results   Component Value Date    LABA1C 5.5 07/06/2020    LABA1C 5.5 12/13/2019    LABA1C 5.8 04/01/2019    LABMICR YES 05/11/2021     Lab Results   Component Value Date     10/04/2021    K 5.4 (H) 10/04/2021     10/04/2021    CO2 23 10/04/2021    BUN 12 10/04/2021    CREATININE 0.5 (L) 10/04/2021    GLUCOSE 92 10/04/2021    CALCIUM 9.2 10/04/2021     Lab Results   Component Value Date    CHOL 141 05/12/2021    TRIG 62 05/12/2021    HDL 57 05/12/2021    LDLCALC 72 05/12/2021     Lab Results   Component Value Date    ALT 35 10/04/2021    AST 48 (H) 10/04/2021     Lab Results   Component Value Date    TSH 2.33 10/04/2021    TSH 1.19 09/17/2015    T4FREE 1.1 09/17/2015     Lab Results   Component Value Date    WBC 4.0 10/04/2021    HGB 12.5 10/04/2021    HCT 38.6 10/04/2021    MCV 80.7 10/04/2021     10/04/2021     Lab Results   Component Value Date    INR 1.06 05/11/2021      No results found for: PSA   No results found for: LABURIC     Patient Active Problem List   Diagnosis    Environmental and seasonal allergies    Mild persistent asthma without complication    Recurrent UTI    Hematuria    Numbness and tingling in right hand    Hammer toe of right foot    Arthritis of right knee    Status post total left knee replacement    Hiatal hernia    Gastroesophageal reflux disease without esophagitis    Ataxia    Seborrheic dermatitis       Allergies   Allergen Reactions    Oxycodone Hcl Nausea And Vomiting    Cephalexin      Vomiting    Oxycodone     Erythromycin Nausea And Vomiting     Outpatient Medications Marked as Taking for the 2/28/22 encounter (Telemedicine) with Aidan Rosa MD   Medication Sig Dispense Refill    montelukast (SINGULAIR) 10 MG tablet Take 1 tablet by mouth nightly 30 tablet 7    fluocinonide (LIDEX) 0.05 % external solution Apply to the affected area 2 times a day on the scalp and ears for 2 weeks then as needed for flares. 60 mL 3    ketoconazole (NIZORAL) 2 % shampoo Apply shampoo three times weekly (M, W, F) leave in 3-5 min, then rinse.  May alternate with other shampoos 120 mL 11    meloxicam (MOBIC) 15 MG tablet Take 1 tablet by mouth daily 30 tablet 0    ketoconazole (NIZORAL) 2 % shampoo Wash scalp 3 times per week. 100 mL 5    fluticasone-salmeterol (ADVAIR) 100-50 MCG/DOSE diskus inhaler INHALE ONE PUFF BY MOUTH EVERY 12 HOURS 1 each 11    fluticasone (FLONASE) 50 MCG/ACT nasal spray 1 spray by Each Nostril route 2 times daily 1 each 2    albuterol sulfate HFA (VENTOLIN HFA) 108 (90 Base) MCG/ACT inhaler Inhale 2 puffs into the lungs every 4 hours as needed for Wheezing 1 each 2    fluocinonide (LIDEX) 0.05 % external solution Apply sparingly to affected area(s) of scalp qd prn flares. 60 mL 2    aspirin 81 MG EC tablet Take 1 tablet by mouth daily 30 tablet 3    Naproxen Sodium (ALEVE) 220 MG CAPS Take by mouth daily as needed for Pain       Multiple Vitamins-Minerals (CENTRUM SILVER 50+WOMEN) TABS Take by mouth       Cetirizine HCl (ALL DAY ALLERGY PO) Take  by mouth daily. Review of Systems: 14 systems were negative except of what was stated on HPI    Nursing note and vitals reviewed. There were no vitals filed for this visit. Wt Readings from Last 3 Encounters:   10/04/21 145 lb (65.8 kg)   05/11/21 152 lb 3.2 oz (69 kg)   04/12/21 156 lb (70.8 kg)     BP Readings from Last 3 Encounters:   10/04/21 120/70   05/12/21 (!) 148/79   04/12/21 132/84     There is no height or weight on file to calculate BMI. Constitutional: Patient appears well-developed and well-nourished. No distress. Head: Normocephalic and atraumatic. Psychiatric: Normal mood and affect.  Behavior is normal.

## 2022-03-04 ENCOUNTER — PROCEDURE VISIT (OUTPATIENT)
Dept: DERMATOLOGY | Age: 82
End: 2022-03-04
Payer: MEDICARE

## 2022-03-04 VITALS — TEMPERATURE: 97.4 F

## 2022-03-04 DIAGNOSIS — L82.1 SEBORRHEIC KERATOSIS: ICD-10-CM

## 2022-03-04 DIAGNOSIS — L21.8 OTHER SEBORRHEIC DERMATITIS: ICD-10-CM

## 2022-03-04 DIAGNOSIS — D04.4 SQUAMOUS CELL CARCINOMA IN SITU (SCCIS) OF SKIN OF NECK: ICD-10-CM

## 2022-03-04 DIAGNOSIS — D04.61 SQUAMOUS CELL CARCINOMA IN SITU (SCCIS) OF SKIN OF RIGHT UPPER ARM: Primary | ICD-10-CM

## 2022-03-04 PROCEDURE — 17272 DSTR MAL LES S/N/H/F/G 1.1-2: CPT | Performed by: DERMATOLOGY

## 2022-03-04 PROCEDURE — 99213 OFFICE O/P EST LOW 20 MIN: CPT | Performed by: DERMATOLOGY

## 2022-03-04 PROCEDURE — 17262 DSTRJ MAL LES T/A/L 1.1-2.0: CPT | Performed by: DERMATOLOGY

## 2022-03-04 NOTE — PATIENT INSTRUCTIONS
ED&C Wound Care      Keep the wound completely dry and covered with the bandage for the next 24-48 hours. After 24-48 hours, remove the bandage and gently wash the area with soap and water as well as do a vinegar soak (see instructions below). Apply Aquaphor or Vaseline ointment to the wound and cover with a new bandage. Repeat this routine for the next 14 days. The area will drip and ooze a little bit. This is normal and washing it once a day seems to help this. Fourteen days after the procedure, continue to wash the wound with soap and water as well as doing vinegar soaks. Apply Aquaphor or Vaseline ointment only and no bandage. Repeat this routine for the next 7 days. After the week is completed, no further wound care is necessary. Watch the wound for signs of infection which includes: redness spreading from the wound, pus like drainage, pain in the area or if the area becomes warm to the touch. If these occur, please call the clinic.

## 2022-03-04 NOTE — PROGRESS NOTES
Patient's Name: Bala Srivastava  MRN: [id number]  YOB: 1940  Date of Visit: 3/4/2022  Primary Care Provider: Kamran Escoto MD  Referring Provider: No ref. provider found    Subjective:   Chief Complaint:   Procedure (ED&C ) and Lesion(s) (itchy on abdomen)      History of Present Illness:   Bala Srivastava is a 79 y.o. female who presents in clinic today for [facility] of SCCIS and evaluation of a new lesion of concern. Last office visit: 2/15/22    At their last visit they had two biopsies revealing SCCIS (Rt extensor upper arm and Lt upper neck). She is concerned about a lesion on the lower abdomen. States lesion is sometimes pruritic. Has been present for several months. Patient is also asking of using the shampoo (ketoconazole) for her scalp is useful. Past medical and surgical histories, current medications, allergies, social and family histories were reviewed with no change since the last visit        Allergies: Allergies   Allergen Reactions    Oxycodone Hcl Nausea And Vomiting    Cephalexin      Vomiting    Oxycodone     Erythromycin Nausea And Vomiting       Current Medications:  Current Outpatient Medications   Medication Sig Dispense Refill    montelukast (SINGULAIR) 10 MG tablet Take 1 tablet by mouth nightly 30 tablet 7    fluocinonide (LIDEX) 0.05 % external solution Apply to the affected area 2 times a day on the scalp and ears for 2 weeks then as needed for flares. 60 mL 3    ketoconazole (NIZORAL) 2 % shampoo Apply shampoo three times weekly (M, W, F) leave in 3-5 min, then rinse. May alternate with other shampoos 120 mL 11    ketoconazole (NIZORAL) 2 % shampoo Wash scalp 3 times per week.  100 mL 5    fluticasone-salmeterol (ADVAIR) 100-50 MCG/DOSE diskus inhaler INHALE ONE PUFF BY MOUTH EVERY 12 HOURS 1 each 11    fluticasone (FLONASE) 50 MCG/ACT nasal spray 1 spray by Each Nostril route 2 times daily 1 each 2    albuterol sulfate HFA (VENTOLIN HFA) 108 (90 Base) MCG/ACT inhaler Inhale 2 puffs into the lungs every 4 hours as needed for Wheezing 1 each 2    fluocinonide (LIDEX) 0.05 % external solution Apply sparingly to affected area(s) of scalp qd prn flares. 60 mL 2    aspirin 81 MG EC tablet Take 1 tablet by mouth daily 30 tablet 3    Naproxen Sodium (ALEVE) 220 MG CAPS Take by mouth daily as needed for Pain       Multiple Vitamins-Minerals (CENTRUM SILVER 50+WOMEN) TABS Take by mouth       Cetirizine HCl (ALL DAY ALLERGY PO) Take  by mouth daily.  meloxicam (MOBIC) 15 MG tablet Take 1 tablet by mouth daily 30 tablet 0    omeprazole (PRILOSEC) 20 MG delayed release capsule Take 1 capsule by mouth every morning (before breakfast) 30 capsule 5     No current facility-administered medications for this visit. Review of Systems:  Constitutional: No fevers, chills or recent illness. Skin: Skin:As per HPI AND otherwise no new, bleeding or symptomatic skin lesions      Objective:     Vitals:    03/04/22 1159   Temp: 97.4 °F (36.3 °C)       Physical Examination:  General: alert, comfortable, no apparent distress, well-appearing  Psych: alert, oriented and pleasant  Neuro: oriented to person, place, and time  Skin: Areas examined: head including face, lips, conjunctiva and lids, neck, hair/scalp, abdomen, left hand, right hand and digits and nails      All areas examined were within normal limits except those listed below with the appropriate assessment and plan    Assessment and Plan (with relevant objective exam findings):     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 2. Seborrheic Keratosis  Location: Rt lower abdomen  Objective: Waxy, stuck on keratotic brown  papule. - Discussed the benign nature of these lesions and that there is no malignant potential.  No treatment desired.     3. Seborrheic dermatitis. Location:occipital scalp  Objective: patches of scaling    - Edu re: chronic recurring course, may flare in times of stress  Reviewed treatment plan with ketoconazole as maintenance and Lidex solution with flares. She verbalized understanding and agreed with the plan        Follow up:  Return visit in 6 weeks or as needed for change. de in condition. All questions addressed. Procedure:   PROCEDURE:  Electrodessication and Curettage  Location:  Rt extensor upper arm   Indication:  SCCIS  Pretreatment Size:  10mm  Size after curettage:  16mm        Verbal and written informed consent was obtained. The area was cleaned with alcohol and infiltrated with 1% lidocaine with epinephrine. After adequate analgesia had been confirmed, the area was treated with electrodessication followed by curettage. There were 3 passes. Hemostasis was achieved with aluminum chloride. Ointment and a sterile dressing were applied and written wound care instructions were given to the patient verbally and in writing. The patient tolerated the procedure well. PROCEDURE:  Electrodessication and Curettage    Location:  Lt upper neck  Indication:  SCCIS  Pretreatment Size:  14 mm  Size After Curettage: 20 mm      Verbal and written informed consent was obtained. The area was cleaned with alcohol and infiltrated with 1% lidocaine with epinephrine. After adequate analgesia had been confirmed, the area was treated with electrodessication followed by curettage. There were 3 passes. Hemostasis was achieved with aluminum chloride. Ointment and a sterile dressing were applied and written wound care instructions were given to the patient verbally and in writing. The patient tolerated the procedure well.        Ada Fischer MD. MS

## 2022-03-07 ENCOUNTER — TELEPHONE (OUTPATIENT)
Dept: DERMATOLOGY | Age: 82
End: 2022-03-07

## 2022-03-07 NOTE — TELEPHONE ENCOUNTER
Patient called she had surgery on Friday  it was an area on her neck. Today she has a rash on her face on the left side of her cheek it is about a 3x3 area. She is not sure if it is from the medication Mupirocin. She started the medication yesterday and she said her son noticed the rash on Saturday she noticed it yesterday. It is Red and  raised but  not itching. She is not sure if it is from the medicine or not. She also had a question about the vinegar soak, on her instruction it said see instruction but there were no instructions for the soak she she was not sure how to mix it.     Patient can be reached at [phone]

## 2022-04-14 ENCOUNTER — TELEPHONE (OUTPATIENT)
Dept: DERMATOLOGY | Age: 82
End: 2022-04-14

## 2022-04-15 ENCOUNTER — OFFICE VISIT (OUTPATIENT)
Dept: DERMATOLOGY | Age: 82
End: 2022-04-15
Payer: MEDICARE

## 2022-04-15 VITALS — TEMPERATURE: 97.2 F

## 2022-04-15 DIAGNOSIS — Z86.007 HISTORY OF SQUAMOUS CELL CARCINOMA IN SITU (SCCIS): Primary | ICD-10-CM

## 2022-04-15 DIAGNOSIS — L81.0 POST-INFLAMMATORY HYPERPIGMENTATION: ICD-10-CM

## 2022-04-15 DIAGNOSIS — L40.8 SEBOPSORIASIS: ICD-10-CM

## 2022-04-15 PROCEDURE — 99214 OFFICE O/P EST MOD 30 MIN: CPT | Performed by: DERMATOLOGY

## 2022-04-15 RX ORDER — FLUOCINONIDE TOPICAL SOLUTION USP, 0.05% 0.5 MG/ML
SOLUTION TOPICAL
Qty: 60 ML | Refills: 5 | Status: SHIPPED | OUTPATIENT
Start: 2022-04-15 | End: 2024-04-15

## 2022-04-15 NOTE — PROGRESS NOTES
Patient's Name: Kailyn Chávez  MRN: [id number]  YOB: 1940  Date of Visit: 4/15/2022  Primary Care Provider: Maryann Paz MD  Referring Provider: No ref. provider found    Subjective:   Chief Complaint:   Follow-up [facility] and evaluation of scalp)      History of Present Illness:   Kailyn Chávez is an 79 y.o. female who presents in clinic today for a follow up s/p ED&C of SCCIS on Lt lateral neck and Rt upper arm, and evaluation of scalp pruritus. Last office visit: 3/4/22    Patient reports ED&C sites healed well. She denies any new, changing, bleeding or non healing lesions. She reports the back scalp being very pruritic. Patient states she has been using ketoconazole shampoo when she washes her hair, but does not seem to help. Past medical and surgical histories, current medications, allergies, social and family histories were reviewed with no change since the last visit        Allergies: Allergies   Allergen Reactions    Oxycodone Hcl Nausea And Vomiting    Cephalexin      Vomiting    Oxycodone     Erythromycin Nausea And Vomiting       Current Medications:  Current Outpatient Medications   Medication Sig Dispense Refill    fluocinonide (LIDEX) 0.05 % external solution Apply to the affected areas 2 times a day on the scalp and ears for 2 weeks then with flares. May use on the affected on the upper back. 60 mL 5    montelukast (SINGULAIR) 10 MG tablet Take 1 tablet by mouth nightly 30 tablet 7    ketoconazole (NIZORAL) 2 % shampoo Apply shampoo three times weekly (M, W, F) leave in 3-5 min, then rinse.  May alternate with other shampoos 120 mL 11    meloxicam (MOBIC) 15 MG tablet Take 1 tablet by mouth daily 30 tablet 0    fluticasone-salmeterol (ADVAIR) 100-50 MCG/DOSE diskus inhaler INHALE ONE PUFF BY MOUTH EVERY 12 HOURS 1 each 11    fluticasone (FLONASE) 50 MCG/ACT nasal spray 1 spray by Each Nostril route 2 times daily 1 each 2    albuterol sulfate HFA (VENTOLIN HFA) 108 (90 Base) MCG/ACT inhaler Inhale 2 puffs into the lungs every 4 hours as needed for Wheezing 1 each 2    aspirin 81 MG EC tablet Take 1 tablet by mouth daily 30 tablet 3    Naproxen Sodium (ALEVE) 220 MG CAPS Take by mouth daily as needed for Pain       Multiple Vitamins-Minerals (CENTRUM SILVER 50+WOMEN) TABS Take by mouth       Cetirizine HCl (ALL DAY ALLERGY PO) Take  by mouth daily.  omeprazole (PRILOSEC) 20 MG delayed release capsule Take 1 capsule by mouth every morning (before breakfast) 30 capsule 5     No current facility-administered medications for this visit. Review of Systems:  Constitutional: No fevers, chills or recent illness. Skin: Skin:As per HPI AND otherwise no new, bleeding or symptomatic skin lesions      Objective:     Vitals:    04/15/22 0836   Temp: 97.2 °F (36.2 °C)       Physical Examination:  General: alert, comfortable, no apparent distress, well-appearing  Psych: alert, oriented and pleasant  Neuro: oriented to person, place, and time  Skin: Areas examined: head including face, lips, conjunctiva and lids, neck, hair/scalp, right upper extremity, left hand, right hand and digits and nails      All areas examined were within normal limits except those listed below with the appropriate assessment and plan    Assessment and Plan (with relevant objective exam findings):     1. SCCIS status post ED&C with post inflammatory hyperpigmentation  Location/objective findings: faintly erythematous to pink patches on Rt extensor upper arm and Lt upper neck with no signs of recurrence    Discussed Risk factors for conditions such as nevi, skin cancers, photo-aging and skin damage including genetics and UV radiation from the sun.  Recommendation was made for sun avoidance, use of protective clothing and daily use of broad spectrum sunscreen containing avobenzone, titanium, or zinc with SPF 50 or higher and every 6 months full skin exams with a dermatologist.  Also instructed to do self skin checks each month, and return to clinic sooner if any new, changing, or concerning spots are identified. Patient was educated regarding PIH and that it may fade with time. 2. Sebopsoriasis (chronic condition with exacerbation)  Location/objective findings: occipital scalp with brightly erythematous plaque with thick adherent scale and excoriations    We reviewed again the chronic recurring course of this condition and that it may flare in times of stress    After discussion of treatment options, risks, and alternatives, patient elected to treat with the following:   Lidex  0.05% solution twice daily to affected areas on scalp and ears x 2 weeks, then as needed with flares      Ketoconazole 2% shampoo three times weekly (M, W, F) leave in 3-5 min, then rinse. May alternate with other shampoos      Follow up:  Patient is scheduled with a dermatologist in June. All questions addressed.      Procedure:   No procedure performed          Godfrey Pham MD. MS

## 2022-05-16 ENCOUNTER — TELEPHONE (OUTPATIENT)
Dept: INTERNAL MEDICINE CLINIC | Age: 82
End: 2022-05-16

## 2022-05-16 NOTE — TELEPHONE ENCOUNTER
Received voicemail from patient on Friday regarding extreme rash. Called patient to offer appointment. Patient was upset call wasn't returned on Friday. I advised patient that no one is on the office on Friday to answer calls and calls are returned on Monday. She states that she spoke with a nurse somewhere and was told a message would be sent but I don't see any documentation in her chart regarding this. Patient states that she went to [facility] and was given medication and she will continue that. Patient asked when her next appointment was. I advised her of October appointment date and time.

## 2022-08-08 ENCOUNTER — TELEPHONE (OUTPATIENT)
Dept: INTERNAL MEDICINE CLINIC | Age: 82
End: 2022-08-08

## 2022-08-08 NOTE — TELEPHONE ENCOUNTER
HARSHA to . Call Transferred from [address] [place] [address],[address]    Symptoms onset 9 days ago. -  Patient c/o cough, sneezing, not feeling well for 2 days before going to Urgent Care. Negative Covid test, Dx with sinusitis. Was given Amoxicillin and started vomiting. Returned to Urgent care,-discontinued Amox. And was given doxycycline 100 bid and Cipmist DM. Has taken both for 7 days but developed hives last night. Took OTC certrizine 07DV for the hives and did feel better, along with helping the sneezing. She is also c/o pain in Rt side of neck starting last night. (Possibly from coughing strain. She has also taken 3 home Covid tests that were all negative. She states she will not return to Urgent Care.  is currently out of the office until 8-10. Patient not interested in appointment with a virtualist, or E-visit. She wants to see an MD today. Advised ER today. Patient reluctant but agreed. Advised to f/up here with  if symptoms worsen or do not improve in the next few days. Patient agreed.

## 2022-08-15 ENCOUNTER — OFFICE VISIT (OUTPATIENT)
Dept: INTERNAL MEDICINE CLINIC | Age: 82
End: 2022-08-15
Payer: MEDICARE

## 2022-08-15 VITALS
SYSTOLIC BLOOD PRESSURE: 124 MMHG | OXYGEN SATURATION: 98 % | HEIGHT: 62 IN | HEART RATE: 78 BPM | WEIGHT: 139 LBS | DIASTOLIC BLOOD PRESSURE: 66 MMHG | BODY MASS INDEX: 25.58 KG/M2

## 2022-08-15 DIAGNOSIS — Z13.820 SCREENING FOR OSTEOPOROSIS: ICD-10-CM

## 2022-08-15 DIAGNOSIS — Z78.0 POSTMENOPAUSE: ICD-10-CM

## 2022-08-15 DIAGNOSIS — J45.30 MILD PERSISTENT ASTHMA WITHOUT COMPLICATION: ICD-10-CM

## 2022-08-15 DIAGNOSIS — R13.10 DYSPHAGIA, UNSPECIFIED TYPE: Primary | ICD-10-CM

## 2022-08-15 DIAGNOSIS — M54.2 CERVICALGIA: ICD-10-CM

## 2022-08-15 PROCEDURE — 1123F ACP DISCUSS/DSCN MKR DOCD: CPT | Performed by: INTERNAL MEDICINE

## 2022-08-15 PROCEDURE — 99214 OFFICE O/P EST MOD 30 MIN: CPT | Performed by: INTERNAL MEDICINE

## 2022-08-15 SDOH — ECONOMIC STABILITY: FOOD INSECURITY: WITHIN THE PAST 12 MONTHS, THE FOOD YOU BOUGHT JUST DIDN'T LAST AND YOU DIDN'T HAVE MONEY TO GET MORE.: NEVER TRUE

## 2022-08-15 SDOH — ECONOMIC STABILITY: FOOD INSECURITY: WITHIN THE PAST 12 MONTHS, YOU WORRIED THAT YOUR FOOD WOULD RUN OUT BEFORE YOU GOT MONEY TO BUY MORE.: NEVER TRUE

## 2022-08-15 ASSESSMENT — PATIENT HEALTH QUESTIONNAIRE - PHQ9
1. LITTLE INTEREST OR PLEASURE IN DOING THINGS: 0
SUM OF ALL RESPONSES TO PHQ QUESTIONS 1-9: 0
SUM OF ALL RESPONSES TO PHQ QUESTIONS 1-9: 0
SUM OF ALL RESPONSES TO PHQ9 QUESTIONS 1 & 2: 0
SUM OF ALL RESPONSES TO PHQ QUESTIONS 1-9: 0
SUM OF ALL RESPONSES TO PHQ QUESTIONS 1-9: 0
2. FEELING DOWN, DEPRESSED OR HOPELESS: 0

## 2022-08-15 ASSESSMENT — SOCIAL DETERMINANTS OF HEALTH (SDOH): HOW HARD IS IT FOR YOU TO PAY FOR THE VERY BASICS LIKE FOOD, HOUSING, MEDICAL CARE, AND HEATING?: NOT HARD AT ALL

## 2022-08-15 NOTE — PROGRESS NOTES
Jing Seals  YOB: 1940    Date of Service:  8/15/2022    Chief Complaint:      Chief Complaint   Patient presents with    Neck Pain     Right sided neck stiffness    Cough     ongoing for several weeks. Dysphagia       Assessment/Plan:  Shilo Hampton was seen today for neck pain, cough and dysphagia. Diagnoses and all orders for this visit:    Dysphagia, unspecified type  -     AFL - Rishabh Montoya MD, Gastroenterology (ERCP & EUS), [facility] [place], [place]    Mild persistent asthma without complication  Start using albuterol as needed    Cervicalgia  Start Tylenol (acetaminophen) or ibuprofen 2-4 three times a day as needed    Postmenopause  -     DEXA BONE DENSITY AXIAL SKELETON; Future    Screening for osteoporosis  -     DEXA BONE DENSITY AXIAL SKELETON; Future      Return keep Fasting Medicare Wellness and f/u 10/10. HPI:  Jing Seals is a 79 y.o. She went to Urgent Care for cough for several weeks and given amoxil which made her throw. She has not been using her albuterol. She complain of solid food getting stuck in her throat for a couple of years. She's had her throat stretch many years ago. She complain of posterior neck pain intermittently but not using any over the counter non steroid antiinflammatory. Mild Asthma:  Stable on Advair 100/50 bid, singulair 10 mg qd and ventolin prn  Allergies:  Stable on Zyrtec 10 mg qd and Flonase 1-2 spray prn. GERD:  resolve on Prilosec 20 mg prn about once every 10 days. Hiatal hernia:  Stable and asymptomatic. Pt agree to seeing specialist at this time. Insomnia:  Improved to 6 hrs on Trazodone 50 mg 1/2 evening prn. She's tried otc melatonin but not been on any meds.       Lab Results   Component Value Date    LABA1C 5.5 07/06/2020    LABA1C 5.5 12/13/2019    LABA1C 5.8 04/01/2019    LABMICR YES 05/11/2021     Lab Results   Component Value Date     10/04/2021    K 5.4 (H) 10/04/2021     10/04/2021    CO2 23 10/04/2021    BUN 12 10/04/2021    CREATININE 0.5 (L) 10/04/2021    GLUCOSE 92 10/04/2021    CALCIUM 9.2 10/04/2021     Lab Results   Component Value Date/Time    CHOL 141 05/12/2021 07:12 AM    TRIG 62 05/12/2021 07:12 AM    HDL 57 05/12/2021 07:12 AM    LDLCALC 72 05/12/2021 07:12 AM     Lab Results   Component Value Date    ALT 35 10/04/2021    AST 48 (H) 10/04/2021     Lab Results   Component Value Date    TSH 2.33 10/04/2021    TSH 1.19 09/17/2015    T4FREE 1.1 09/17/2015     Lab Results   Component Value Date    WBC 4.0 10/04/2021    HGB 12.5 10/04/2021    HCT 38.6 10/04/2021    MCV 80.7 10/04/2021     10/04/2021     Lab Results   Component Value Date    INR 1.06 05/11/2021      No results found for: PSA   No results found for: [address].     Patient Active Problem List   Diagnosis    Environmental and seasonal allergies    Mild persistent asthma without complication    Recurrent UTI    Hematuria    Numbness and tingling in right hand    Hammer toe of right foot    Arthritis of right knee    Status post total left knee replacement    Hiatal hernia    Gastroesophageal reflux disease without esophagitis    Ataxia    Seborrheic dermatitis       Allergies   Allergen Reactions    Oxycodone Hcl Nausea And Vomiting    Cephalexin      Vomiting    Oxycodone     Erythromycin Nausea And Vomiting    Pcn [Penicillins] Hives and Nausea And Vomiting     Vomiting and rash on penicillin as a child. Outpatient Medications Marked as Taking for the 8/15/22 encounter (Office Visit) with Russell Rosa MD   Medication Sig Dispense Refill    fluocinonide (LIDEX) 0.05 % external solution Apply to the affected areas 2 times a day on the scalp and ears for 2 weeks then with flares. May use on the affected on the upper back. 60 mL 5    montelukast (SINGULAIR) 10 MG tablet Take 1 tablet by mouth nightly 30 tablet 7    ketoconazole (NIZORAL) 2 % shampoo Apply shampoo three times weekly (M, W, F) leave in 3-5 min, then rinse.  May alternate with other

## 2022-08-24 RX ORDER — OMEPRAZOLE 20 MG/1
20 CAPSULE, DELAYED RELEASE ORAL PRN
COMMUNITY

## 2022-08-24 NOTE — PROGRESS NOTES
Obstructive Sleep Apnea (MARYAM) Screening     Patient:  Danisha Ritchie    YOB: 1940      Medical Record #:  [id number]                     Date:  8/24/2022     1. Are you a loud and/or regular snorer? []  Yes       [x] No    2. Have you been observed to gasp or stop breathing during sleep? []  Yes       [x] No    3. Do you feel tired or groggy upon awakening or do you awaken with a headache?           []  Yes       [] No    4. Are you often tired or fatigued during the wake time hours? []  Yes       [] No    5. Do you fall asleep sitting, reading, watching TV or driving? []  Yes       [] No    6. Do you often have problems with memory or concentration? []  Yes       [] No    **If patient's score is ? 3 they are considered high risk for MARYAM. An Anesthesia provider will evaluate the patient and develop a plan of care the day of surgery. Note:  If the patient's BMI is more than 35 kg m¯² , has neck circumference > 40 cm, and/or high blood pressure the risk is greater (© American Sleep [employer]nea Association, 2006).

## 2022-08-24 NOTE — PROGRESS NOTES

## 2022-08-31 ENCOUNTER — HOSPITAL ENCOUNTER (OUTPATIENT)
Age: 82
Setting detail: OUTPATIENT SURGERY
Discharge: HOME | End: 2022-08-31
Attending: INTERNAL MEDICINE | Admitting: INTERNAL MEDICINE
Payer: MEDICARE

## 2022-08-31 ENCOUNTER — ANESTHESIA EVENT (OUTPATIENT)
Dept: ENDOSCOPY | Age: 82
End: 2022-08-31
Payer: MEDICARE

## 2022-08-31 ENCOUNTER — ANESTHESIA (OUTPATIENT)
Dept: ENDOSCOPY | Age: 82
End: 2022-08-31
Payer: MEDICARE

## 2022-08-31 VITALS
OXYGEN SATURATION: 97 % | HEART RATE: 66 BPM | RESPIRATION RATE: 16 BRPM | BODY MASS INDEX: 24.63 KG/M2 | HEIGHT: 63 IN | WEIGHT: 139 LBS | DIASTOLIC BLOOD PRESSURE: 68 MMHG | TEMPERATURE: 97.4 F | SYSTOLIC BLOOD PRESSURE: 150 MMHG

## 2022-08-31 DIAGNOSIS — K44.9 HIATAL HERNIA: Primary | ICD-10-CM

## 2022-08-31 PROCEDURE — 6370000000 HC RX 637 (ALT 250 FOR IP)

## 2022-08-31 PROCEDURE — 3700000000 HC ANESTHESIA ATTENDED CARE: Performed by: INTERNAL MEDICINE

## 2022-08-31 PROCEDURE — 3609017100 HC EGD: Performed by: INTERNAL MEDICINE

## 2022-08-31 PROCEDURE — 2709999900 HC NON-CHARGEABLE SUPPLY: Performed by: INTERNAL MEDICINE

## 2022-08-31 PROCEDURE — 2580000003 HC RX 258: Performed by: INTERNAL MEDICINE

## 2022-08-31 PROCEDURE — 7100000011 HC PHASE II RECOVERY - ADDTL 15 MIN: Performed by: INTERNAL MEDICINE

## 2022-08-31 PROCEDURE — 7100000010 HC PHASE II RECOVERY - FIRST 15 MIN: Performed by: INTERNAL MEDICINE

## 2022-08-31 PROCEDURE — 3700000001 HC ADD 15 MINUTES (ANESTHESIA): Performed by: INTERNAL MEDICINE

## 2022-08-31 PROCEDURE — 6360000002 HC RX W HCPCS: Performed by: NURSE ANESTHETIST, CERTIFIED REGISTERED

## 2022-08-31 PROCEDURE — 2580000003 HC RX 258: Performed by: NURSE ANESTHETIST, CERTIFIED REGISTERED

## 2022-08-31 PROCEDURE — 2500000003 HC RX 250 WO HCPCS: Performed by: NURSE ANESTHETIST, CERTIFIED REGISTERED

## 2022-08-31 RX ORDER — PROPOFOL 10 MG/ML
INJECTION, EMULSION INTRAVENOUS PRN
Status: DISCONTINUED | OUTPATIENT
Start: 2022-08-31 | End: 2022-08-31 | Stop reason: SDUPTHER

## 2022-08-31 RX ORDER — LIDOCAINE HYDROCHLORIDE 10 MG/ML
0.1 INJECTION, SOLUTION EPIDURAL; INFILTRATION; INTRACAUDAL; PERINEURAL
Status: DISCONTINUED | OUTPATIENT
Start: 2022-08-31 | End: 2022-08-31 | Stop reason: HOSPADM

## 2022-08-31 RX ORDER — LIDOCAINE HYDROCHLORIDE 20 MG/ML
INJECTION, SOLUTION INFILTRATION; PERINEURAL PRN
Status: DISCONTINUED | OUTPATIENT
Start: 2022-08-31 | End: 2022-08-31 | Stop reason: SDUPTHER

## 2022-08-31 RX ORDER — SODIUM CHLORIDE, SODIUM LACTATE, POTASSIUM CHLORIDE, CALCIUM CHLORIDE 600; 310; 30; 20 MG/100ML; MG/100ML; MG/100ML; MG/100ML
INJECTION, SOLUTION INTRAVENOUS CONTINUOUS PRN
Status: DISCONTINUED | OUTPATIENT
Start: 2022-08-31 | End: 2022-08-31 | Stop reason: SDUPTHER

## 2022-08-31 RX ORDER — SODIUM CHLORIDE, SODIUM LACTATE, POTASSIUM CHLORIDE, CALCIUM CHLORIDE 600; 310; 30; 20 MG/100ML; MG/100ML; MG/100ML; MG/100ML
INJECTION, SOLUTION INTRAVENOUS ONCE
Status: COMPLETED | OUTPATIENT
Start: 2022-08-31 | End: 2022-08-31

## 2022-08-31 RX ORDER — SCOLOPAMINE TRANSDERMAL SYSTEM 1 MG/1
PATCH, EXTENDED RELEASE TRANSDERMAL
Status: DISCONTINUED
Start: 2022-08-31 | End: 2022-08-31 | Stop reason: HOSPADM

## 2022-08-31 RX ORDER — SCOLOPAMINE TRANSDERMAL SYSTEM 1 MG/1
1 PATCH, EXTENDED RELEASE TRANSDERMAL
Status: DISCONTINUED | OUTPATIENT
Start: 2022-08-31 | End: 2022-08-31 | Stop reason: HOSPADM

## 2022-08-31 RX ADMIN — SODIUM CHLORIDE, SODIUM LACTATE, POTASSIUM CHLORIDE, AND CALCIUM CHLORIDE: .6; .31; .03; .02 INJECTION, SOLUTION INTRAVENOUS at 07:24

## 2022-08-31 RX ADMIN — LIDOCAINE HYDROCHLORIDE 60 MG: 20 INJECTION, SOLUTION INFILTRATION; PERINEURAL at 07:31

## 2022-08-31 RX ADMIN — SODIUM CHLORIDE, POTASSIUM CHLORIDE, SODIUM LACTATE AND CALCIUM CHLORIDE: 600; 310; 30; 20 INJECTION, SOLUTION INTRAVENOUS at 07:05

## 2022-08-31 RX ADMIN — PROPOFOL 50 MG: 10 INJECTION, EMULSION INTRAVENOUS at 07:31

## 2022-08-31 ASSESSMENT — PAIN - FUNCTIONAL ASSESSMENT: PAIN_FUNCTIONAL_ASSESSMENT: 0-10

## 2022-08-31 NOTE — H&P
[address]   Pre-operative History and Physical    Patient: Niharika Edouard  : 1940  Acct#:     HISTORY OF PRESENT ILLNESS:    The patient is a 79 y.o. female who presents for esophageal dysphagia she has a history of Schatzki's ring dilated back in . Indications: Esophageal dysphagia    Past Medical History:        Diagnosis Date    Allergic rhinitis     Arthritis     Asthma     mild    Cancer (HCC)     skin    GERD (gastroesophageal reflux disease)     Hematuria, microscopic 2018    Knee joint disorder 2018    left knee injection x3    Knee pain, right     PONV (postoperative nausea and vomiting)     Reflux     Seasonal allergies     Tinnitus       Past Surgical History:        Procedure Laterality Date    CATARACT REMOVAL WITH IMPLANT Right 2019    CATARACT REMOVAL WITH IMPLANT Left 2019     PHACO EMULSIFICATION OF CATARACT WITH INTRAOCULAR LENS IMPLANT EYE    COLONOSCOPY      COLONOSCOPY  2014    polyp    CYSTOSCOPY  2018    DILATION AND CURETTAGE OF UTERUS      FOOT SURGERY Bilateral [id number]    rt ft 3rd digit proximalinterphalangeal joint arthrodesis/lt  ft 5th/2nd proximal interphalangeal joint arthrodesis    INTRACAPSULAR CATARACT EXTRACTION Right 2019    PHACO EMULSIFICATION OF CATARACT WITH  INTRAOCULAR LENS IMPLANT RIGHT EYE performed by Calli Keller MD at [address] [place] [address] Left 2019    PHACO EMULSIFICATION OF CATARACT WITH INTRAOCULAR LENS IMPLANT EYE performed by Calli Keller MD at [address] [place] [address] Right 2018    right total knee replacement    MOHS SURGERY  2021    TONSILLECTOMY      UPPER GASTROINTESTINAL ENDOSCOPY  03/15/2013      Medications Prior to Admission:   No current facility-administered medications on file prior to encounter.      Current Outpatient Medications on File Prior to Encounter   Medication Sig Dispense Refill    omeprazole (PRILOSEC) 20 MG delayed release capsule Take 20 mg by mouth as needed      fluocinonide (LIDEX) 0.05 % external solution Apply to the affected areas 2 times a day on the scalp and ears for 2 weeks then with flares. May use on the affected on the upper back. 60 mL 5    ketoconazole (NIZORAL) 2 % shampoo Apply shampoo three times weekly (M, W, F) leave in 3-5 min, then rinse. May alternate with other shampoos 120 mL 11    fluticasone-salmeterol (ADVAIR) 100-50 MCG/DOSE diskus inhaler INHALE ONE PUFF BY MOUTH EVERY 12 HOURS 1 each 11    fluticasone (FLONASE) 50 MCG/ACT nasal spray 1 spray by Each Nostril route 2 times daily 1 each 2    albuterol sulfate HFA (VENTOLIN HFA) 108 (90 Base) MCG/ACT inhaler Inhale 2 puffs into the lungs every 4 hours as needed for Wheezing 1 each 2    aspirin 81 MG EC tablet Take 1 tablet by mouth daily 30 tablet 3    Naproxen Sodium 220 MG CAPS Take by mouth daily as needed for Pain       Multiple Vitamins-Minerals (CENTRUM SILVER 50+WOMEN) TABS Take by mouth daily      Cetirizine HCl (ALL DAY ALLERGY PO) Take  by mouth daily. Allergies:  Oxycodone hcl, Cephalexin, Oxycodone, Erythromycin, and Pcn [penicillins]    Social History:   Social History     Socioeconomic History    Marital status:       Spouse name: Not on file    Number of children: 4    Years of education: 25    Highest education level: Not on file   Occupational History    Occupation: therapist     Comment: [employer]   Tobacco Use    Smoking status: Never    Smokeless tobacco: Never   Vaping Use    Vaping Use: Never used   Substance and Sexual Activity    Alcohol use: Not Currently     Alcohol/week: 0.0 - 1.0 standard drinks    Drug use: No    Sexual activity: Never   Other Topics Concern    Not on file   Social History Narrative    Not on file     Social Determinants of Health     Financial Resource Strain: Low Risk     Difficulty of Paying Living Expenses: Not hard at all   Food Insecurity: No Food Insecurity    Worried About Running Out of Food in the Last Year: Never true    Ran Out of Food in the Last Year: Never true   Transportation Needs: Not on file   Physical Activity: Not on file   Stress: Not on file   Social Connections: Not on file   Intimate Partner Violence: Not on file   Housing Stability: Not on file      Family History:       Problem Relation Age of Onset    Heart Disease Mother     Arthritis Mother     Cancer Father     Arthritis Father     Arthritis Sister     Rheum Arthritis Brother     Arthritis Brother     Asthma Brother     Asthma Other     Arthritis Brother     Arthritis Brother     Arthritis Brother     Arthritis Brother     Asthma Son     Seizures Sister     Seizures Sister         PHYSICAL EXAM:      /65   Pulse 69   Temp 97.4 °F (36.3 °C) (Temporal)   Resp 18   Ht 5' 2.5\" (1.588 m)   Wt 139 lb (63 kg)   SpO2 97%   BMI 25.02 kg/m²  I        Heart:  Normal apical impulse, regular rate and rhythm, normal S1 and S2, no S3 or S4, and no murmur noted    Lungs:  No increased work of breathing, good air exchange, clear to auscultation bilaterally, no crackles or wheezing    Abdomen:  No scars, normal bowel sounds, soft, non-distended, non-tender, no masses palpated, no hepatosplenomegally      ASA Class  ASA 3 - Patient with moderate systemic disease with functional limitations    Mallampati Class: 2      ASSESSMENT AND PLAN:    1. Patient is a suitable candidate for endoscopic procedure and attendant anesthesia  2. Risks, benefits, alternatives of procedure discussed in detail with patient including risks of bleeding, infection, perforation, risks of sedation, risks of missed lesions. The patient wishes to proceed.

## 2022-08-31 NOTE — ANESTHESIA PRE PROCEDURE
Department of Anesthesiology  Preprocedure Note       Name:  Kamla French   Age:  79 y.o.  :  1940                                          MRN:  [id number]         Date:  2022      Surgeon: Vianca Ca):  Beba Rivera MD    Procedure: Procedure(s):  EGD    Medications prior to admission:   Prior to Admission medications    Medication Sig Start Date End Date Taking? Authorizing Provider   omeprazole (PRILOSEC) 20 MG delayed release capsule Take 20 mg by mouth as needed   Yes Historical Provider, MD   fluocinonide (LIDEX) 0.05 % external solution Apply to the affected areas 2 times a day on the scalp and ears for 2 weeks then with flares. May use on the affected on the upper back. 4/15/22 4/15/24  Abdoulaye Funes MD   ketoconazole (NIZORAL) 2 % shampoo Apply shampoo three times weekly (M, W, F) leave in 3-5 min, then rinse. May alternate with other shampoos 2/15/22   Abdoulaye Funes MD   fluticasone-salmeterol (ADVAIR) 100-50 MCG/DOSE diskus inhaler INHALE ONE PUFF BY MOUTH EVERY 12 HOURS 10/4/21   Ary Rosa MD   fluticasone (FLONASE) 50 MCG/ACT nasal spray 1 spray by Each Nostril route 2 times daily 10/4/21   Denny Rosa MD   albuterol sulfate HFA (VENTOLIN HFA) 108 (90 Base) MCG/ACT inhaler Inhale 2 puffs into the lungs every 4 hours as needed for Wheezing 10/4/21   Denny Rosa MD   aspirin 81 MG EC tablet Take 1 tablet by mouth daily 21   Saralyn Hands, APRN - CNP   Naproxen Sodium 220 MG CAPS Take by mouth daily as needed for Pain     Historical Provider, MD   Multiple Vitamins-Minerals (CENTRUM SILVER 50+WOMEN) TABS Take by mouth daily    Historical Provider, MD   Cetirizine HCl (ALL DAY ALLERGY PO) Take  by mouth daily.     Historical Provider, MD       Current medications:    Current Facility-Administered Medications   Medication Dose Route Frequency Provider Last Rate Last Admin    lidocaine PF 1 % injection 0.1 mL  0.1 mL IntraDERmal Once PRN Beba Rivera MD        scopolamine (TRANSDERM-SCOP) 1 MG/3DAYS transdermal patch                Allergies: Allergies   Allergen Reactions    Oxycodone Hcl Nausea And Vomiting    Cephalexin      Vomiting    Oxycodone     Erythromycin Nausea And Vomiting    Pcn [Penicillins] Hives and Nausea And Vomiting     Vomiting and rash on penicillin as a child.        Problem List:    Patient Active Problem List   Diagnosis Code    Environmental and seasonal allergies J30.89    Mild persistent asthma without complication X20.15    Recurrent UTI N39.0    Hematuria R31.9    Numbness and tingling in right hand R20.0, R20.2    Hammer toe of right foot M20.41    Arthritis of right knee M17.11    Status post total left knee replacement Z96.652    Hiatal hernia K44.9    Gastroesophageal reflux disease without esophagitis K21.9    Ataxia R27.0    Seborrheic dermatitis L21.9       Past Medical History:        Diagnosis Date    Allergic rhinitis     Arthritis     Asthma     mild    Cancer ([address]     skin    GERD (gastroesophageal reflux disease)     Hematuria, microscopic 02/2018    Knee joint disorder 2018    left knee injection x3    Knee pain, right     PONV (postoperative nausea and vomiting)     Reflux     Seasonal allergies     Tinnitus        Past Surgical History:        Procedure Laterality Date    CATARACT REMOVAL WITH IMPLANT Right 04/05/2019    CATARACT REMOVAL WITH IMPLANT Left 06/27/2019     PHACO EMULSIFICATION OF CATARACT WITH INTRAOCULAR LENS IMPLANT EYE    COLONOSCOPY  2009    COLONOSCOPY  09/17/2014    polyp    CYSTOSCOPY  02/2018    DILATION AND CURETTAGE OF UTERUS      FOOT SURGERY Bilateral [id number]    rt ft 3rd digit proximalinterphalangeal joint arthrodesis/lt  ft 5th/2nd proximal interphalangeal joint arthrodesis    INTRACAPSULAR CATARACT EXTRACTION Right 04/05/2019    PHACO EMULSIFICATION OF CATARACT WITH  INTRAOCULAR LENS IMPLANT RIGHT EYE performed by Kwadwo Webb MD at [address] [place] [address] EXTRACTION Left 06/27/2019    PHACO EMULSIFICATION OF CATARACT WITH INTRAOCULAR LENS IMPLANT EYE performed by Shashank Shaffer MD at [address] Right 06/19/2018    right total knee replacement    MOHS SURGERY  03/22/2021    TONSILLECTOMY      UPPER GASTROINTESTINAL ENDOSCOPY  03/15/2013       Social History:    Social History     Tobacco Use    Smoking status: Never    Smokeless tobacco: Never   Substance Use Topics    Alcohol use: Not Currently     Alcohol/week: 0.0 - 1.0 standard drinks                                Counseling given: Not Answered      Vital Signs (Current):   Vitals:    08/24/22 1401 08/31/22 0700   BP:  118/65   Pulse:  69   Resp:  18   Temp:  97.4 °F (36.3 °C)   TempSrc:  Temporal   SpO2:  97%   Weight: 139 lb (63 kg) 139 lb (63 kg)   Height: 5' 2.5\" (1.588 m) 5' 2.5\" (1.588 m)                                              BP Readings from Last 3 Encounters:   08/31/22 118/65   08/15/22 124/66   10/04/21 120/70       NPO Status: Time of last liquid consumption: 2000                        Time of last solid consumption: 2000                        Date of last liquid consumption: 08/30/22                        Date of last solid food consumption: 08/30/22    BMI:   Wt Readings from Last 3 Encounters:   08/31/22 139 lb (63 kg)   08/15/22 139 lb (63 kg)   10/04/21 145 lb (65.8 kg)     Body mass index is 25.02 kg/m².     CBC:   Lab Results   Component Value Date/Time    WBC 4.0 10/04/2021 10:47 AM    RBC 4.79 10/04/2021 10:47 AM    HGB 12.5 10/04/2021 10:47 AM    HCT 38.6 10/04/2021 10:47 AM    MCV 80.7 10/04/2021 10:47 AM    RDW 15.8 10/04/2021 10:47 AM     10/04/2021 10:47 AM       CMP:   Lab Results   Component Value Date/Time     10/04/2021 10:47 AM    K 5.4 10/04/2021 10:47 AM    K 4.4 05/11/2021 09:29 AM     10/04/2021 10:47 AM    CO2 23 10/04/2021 10:47 AM    BUN 12 10/04/2021 10:47 AM    CREATININE 0.5 10/04/2021 10:47 AM    GFRAA >60 10/04/2021 10:47 AM    GFRAA >60 09/24/2011 05:30 PM    AGRATIO 1.4 10/04/2021 10:47 AM    LABGLOM >60 10/04/2021 10:47 AM    GLUCOSE 92 10/04/2021 10:47 AM    PROT 7.1 10/04/2021 10:47 AM    PROT 6.1 09/24/2011 05:30 PM    CALCIUM 9.2 10/04/2021 10:47 AM    BILITOT <0.2 10/04/2021 10:47 AM    ALKPHOS 85 10/04/2021 10:47 AM    AST 48 10/04/2021 10:47 AM    ALT 35 10/04/2021 10:47 AM       POC Tests: No results for input(s): POCGLU, POCNA, POCK, POCCL, POCBUN, POCHEMO, POCHCT in the last 72 hours. Coags:   Lab Results   Component Value Date/Time    PROTIME 12.3 05/11/2021 10:07 AM    INR 1.06 05/11/2021 10:07 AM       HCG (If Applicable): No results found for: PREGTESTUR, PREGSERUM, HCG, HCGQUANT     ABGs: No results found for: PHART, PO2ART, XNN8DIU, XDE3AHN, BEART, F0FUGPIK     Type & Screen (If Applicable):  No results found for: LABABO, LABRH    Drug/Infectious Status (If Applicable):  No results found for: HIV, HEPCAB    COVID-19 Screening (If Applicable): No results found for: COVID19        Anesthesia Evaluation     history of anesthetic complications: PONV. Airway: Mallampati: II     Neck ROM: full  Mouth opening: > = 3 FB   Dental: normal exam         Pulmonary:normal exam    (+) asthma:                            Cardiovascular:Negative CV ROS                      Neuro/Psych:   Negative Neuro/Psych ROS              GI/Hepatic/Renal:   (+) hiatal hernia, GERD:,           Endo/Other: Negative Endo/Other ROS                    Abdominal:             Vascular: negative vascular ROS. Other Findings:           Anesthesia Plan      MAC     ASA 3       Induction: intravenous. MIPS: Prophylactic antiemetics administered. Anesthetic plan and risks discussed with patient. Plan discussed with CRNA.     Attending anesthesiologist reviewed and agrees with Preprocedure content                BRODIE Henderson MD   8/31/2022

## 2022-08-31 NOTE — DISCHARGE INSTRUCTIONS
PATIENT INSTRUCTIONS  POST-SEDATION    Sharona Ace          IMMEDIATELY FOLLOWING PROCEDURE:    Do not drive or operate machinery for the first twenty four hours after surgery. Do not make any important decisions for twenty four hours after surgery or while taking narcotic pain medications or sedatives. You should NOT BE LEFT UNATTENDED OR ALONE. A responsible adult should be with you for the rest of the day of your procedure and also during the night for your protection and safety. You may experience some light headedness. Rest at home with activity as tolerated. You may not need to go to bed, but it is important to rest for the next 24 hours. You should not engage in athletic sports such as basketball, volleyball, jogging, skating, or activities requiring refined motor skills for 24 hours. If you develop intractable nausea and vomiting or a severe headache please notify your doctor immediately. You are not expected to have any fever, but if you feel warm, take your temperature. If you have a fever 101 degrees or higher, call your doctor. If you have had an Endoscopy:   *Eat lightly for your first meal and gradually resume your normal / prescribed diet. DO NOT eat or drink until your gag reflex returns. *If you have a sore throat you may use lozenges, or salt water gargles. ONCE YOU ARE HOME, IF YOU SHOULD HAVE:  Difficulty in breathing, persistent nausea or vomiting, bleeding you feel is excessive, or pain that is unusual, increased abdominal bloating, or any swelling, fever / chills, call your physician. If you cannot contact your physician, but feel that your signs and symptoms need a physician's attention, go to the Emergency Department. FOLLOW-UP:    Please follow up with @PCP@ as scheduled or needed. Dr. Juan Simmons MD will call you with the biopsy findings. Call Dr. Juan Simmons MD if there are any GI concerns.  [phone]        You may be receiving a follow up phone call to ask about your care. Upper GI Endoscopy: What to Expect at [address] [place] [address]  After you have an endoscopy, you will stay at the hospital or clinic for 1 to 2 hours. This will allow the medicine to wear off. You will be able to go home after your doctor or nurse checks to make sure you are not having any problems. You may have to stay overnight if you had treatment during the test. You may have a sore throat for a day or two after the test.  This care sheet gives you a general idea about what to expect after the test.  How can you care for yourself at home? Activity  Rest as much as you need to after you go home. You should be able to go back to your usual activities the day after the test.  Diet  Follow your doctor's directions for eating after the test.  Drink plenty of fluids (unless your doctor has told you not to). Medications  If you have a sore throat the day after the test, use an over-the-counter spray to numb your throat. Follow-up care is a key part of your treatment and safety. Be sure to make and go to all appointments, and call your doctor if you are having problems. It's also a good idea to know your test results and keep a list of the medicines you take. When should you call for help? Call 911 anytime you think you may need emergency care. For example, call if:    You passed out (loses consciousness). You have trouble breathing. You pass maroon or bloody stools. Call your doctor now or seek immediate medical care if:    You have pain that does not get better after your take pain medicine. You have new or worse belly pain. You have blood in your stools. You are sick to your stomach and cannot keep fluids down. You have a fever. You cannot pass stools or gas. Watch closely for changes in your health, and be sure to contact your doctor if:    Your throat still hurts after a day or two. You do not get better as expected.    Where can you learn more? Go to [email or web address] and sign in to your [employer] account. Enter C418 in the [facility] box to learn more about \"Upper GI Endoscopy: What to Expect at Home. \"     If you do not have an account, please click on the \"Sign Up Now\" link. Current as of: May 12, 2017  Content Version: 11.6  © 3968-6577 [employer]. Care instructions adapted under license by [facility] ([facility]). If you have questions about a medical condition or this instruction, always ask your healthcare professional. [place] [address] any warranty or liability for your use of this information. ANESTHESIA DISCHARGE INSTRUCTIONS    You are under the influence of drugs- do not drink alcohol, drive a car, operate machinery(such as power tools, kitchen appliances, etc), sign legal documents, or make any important decisions for 24 hours (or while on pain medications). Children should not ride bikes or [place] or play on gym sets  for 24 hours after surgery. A responsible adult should be with you for 24 hours. Rest at home today- increase activity as tolerated. Progress slowly to a regular diet unless your physician has instructed you otherwise. Drink plenty of water. CALL YOUR DOCTOR IF YOU:  Have moderate to severe nausea or vomiting AND are unable to hold down fluids or prescribed medications. Have bright red bloody drainage from your dressing that won't stop oozing.   Do not get relief with your pain medication    NORMAL (POSSIBLE) SIDE EFFECTS FROM ANESTHESIA:     Confusion, temporary memory loss, delayed reaction times in the first 24 hours  Lightheadedness, dizziness, difficulty focusing, blurred vision  Nausea/vomiting can happen  Shivering, feeling cold, sore throat, cough and muscle aches should stop within 24-48 hours  Trouble urinating - call your surgeon if it has been more than 8 hrs  Bruising or soreness at the IV site - call if it remains red, firm or there is drainage             FEMALES OF CHILDBEARING AGE WHO ARE TAKING BIRTH CONTROL PILLS:  You may have received a medication during your procedure that interferes with the   actions of birth control pills (Bridion or Emend). Use some other kind of birth control in addition to your pills, like a condom, for 1 month after your procedure to prevent unwanted pregnancy. The following instructions are to be followed if you have a known history or diagnosis of sleep apnea: For all sleep apnea patients:  ? Sleep on your side or sitting up in a chair whenever possible, especially the first 24 hours after surgery. ? Use only medicines prescribed by your doctor. ? Do not drink alcohol. ? If you have a dental device to assist you while at rest, use it at all times for the first 24 hours. For patients using CPAP machines:  ? Use your CPAP machine during all periods of sleep as usual.  ? Use your CPAP machine during all periods of daytime rest while on pain medicines. ** Follow up with your primary care doctor for continued care. IF YOU DO NOT TAKE ALL OF YOUR NARCOTIC PAIN MEDICATION, please dispose of them responsibly. There are drop off boxes in the Emergency Departments 24/7 at both [facility] and [place]. If these locations are not convenient, other options for discarding them can be found at:  [email or web address]    Hospital or office staff may NOT accept any medications to drop off in the cabinet for you.

## 2022-08-31 NOTE — ANESTHESIA POSTPROCEDURE EVALUATION
Department of Anesthesiology  Postprocedure Note    Patient: Cathleen Mata  MRN: [id number]  YOB: 1940  Date of evaluation: 8/31/2022      Procedure Summary     Date: 08/31/22 Room / Location: [facility] [address] [place] [address]    Anesthesia Start: 0182 Anesthesia Stop: 6018    Procedure: EGD DIAGNOSTIC ONLY Diagnosis:       Dysphagia, unspecified type      (DYSPHAGIA)    Surgeons: Kristen Rodríguez MD Responsible Provider: Logan Macedo MD    Anesthesia Type: MAC ASA Status: 3          Anesthesia Type: No value filed.     Jameel Phase I: Jameel Score: 10    Jameel Phase II: Jameel Score: 8      Anesthesia Post Evaluation    Patient location during evaluation: PACU  Patient participation: complete - patient participated  Level of consciousness: awake and alert  Pain score: 0  Airway patency: patent  Nausea & Vomiting: no nausea  Complications: no  Cardiovascular status: blood pressure returned to baseline  Respiratory status: acceptable  Hydration status: euvolemic

## 2022-09-07 ENCOUNTER — HOSPITAL ENCOUNTER (OUTPATIENT)
Dept: GENERAL RADIOLOGY | Age: 82
Discharge: HOME | End: 2022-09-07
Payer: MEDICARE

## 2022-09-07 DIAGNOSIS — K44.9 HIATAL HERNIA: ICD-10-CM

## 2022-09-07 PROCEDURE — 74240 X-RAY XM UPR GI TRC 1CNTRST: CPT

## 2022-09-14 ENCOUNTER — TELEPHONE (OUTPATIENT)
Dept: CARDIOTHORACIC SURGERY | Age: 82
End: 2022-09-14

## 2022-09-28 ENCOUNTER — OFFICE VISIT (OUTPATIENT)
Dept: CARDIOTHORACIC SURGERY | Age: 82
End: 2022-09-28
Payer: MEDICARE

## 2022-09-28 VITALS
HEIGHT: 63 IN | WEIGHT: 139.6 LBS | DIASTOLIC BLOOD PRESSURE: 77 MMHG | HEART RATE: 77 BPM | OXYGEN SATURATION: 96 % | BODY MASS INDEX: 24.73 KG/M2 | SYSTOLIC BLOOD PRESSURE: 120 MMHG | TEMPERATURE: 97.2 F

## 2022-09-28 DIAGNOSIS — K44.9 HIATAL HERNIA: Primary | ICD-10-CM

## 2022-09-28 PROCEDURE — 99205 OFFICE O/P NEW HI 60 MIN: CPT

## 2022-09-28 PROCEDURE — 1123F ACP DISCUSS/DSCN MKR DOCD: CPT

## 2022-09-28 NOTE — PROGRESS NOTES
Consultation H&P        Date of Admission:    Date of Consultation:  9/28/2022    PCP:  Chucky Barrera MD    Referred    Chief Complaint: dysphagia    History of Present Illness: We are asked to see this patient in consultation by Dr. Michele Olmos regarding paraaesophageal hernia, and esophageal diverticulum. Richard Noble is a 79 y.o. female with PMH of skin CA, OA, who presents with large hiatal hernia, with esophageal diverticulum after having UGI several days ago for persistent dysphagia x 2-3 years. Endorses GERD. Denies SOB, CP. We were consulted for surgical intervention.        Past Medical History:  Past Medical History:   Diagnosis Date    Allergic rhinitis     Arthritis     Asthma     mild    Cancer (HCC)     skin    GERD (gastroesophageal reflux disease)     Hematuria, microscopic 02/2018    Knee joint disorder 2018    left knee injection x3    Knee pain, right     PONV (postoperative nausea and vomiting)     Reflux     Seasonal allergies     Tinnitus        Past Surgical History:  Past Surgical History:   Procedure Laterality Date    CATARACT REMOVAL WITH IMPLANT Right 04/05/2019    CATARACT REMOVAL WITH IMPLANT Left 06/27/2019     PHACO EMULSIFICATION OF CATARACT WITH INTRAOCULAR LENS IMPLANT EYE    COLONOSCOPY  2009    COLONOSCOPY  09/17/2014    polyp    CYSTOSCOPY  02/2018    DILATION AND CURETTAGE OF UTERUS      FOOT SURGERY Bilateral [id number]    rt ft 3rd digit proximalinterphalangeal joint arthrodesis/lt  ft 5th/2nd proximal interphalangeal joint arthrodesis    INTRACAPSULAR CATARACT EXTRACTION Right 04/05/2019    PHACO EMULSIFICATION OF CATARACT WITH  INTRAOCULAR LENS IMPLANT RIGHT EYE performed by Krzysztof Armenta MD at [facility] Left 06/27/2019    PHACO EMULSIFICATION OF CATARACT WITH INTRAOCULAR LENS IMPLANT EYE performed by Krzysztof Armenta MD at [facility] Right 06/19/2018    right total knee replacement    MOHS SURGERY  03/22/2021 TONSILLECTOMY      UPPER GASTROINTESTINAL ENDOSCOPY  03/15/2013    UPPER GASTROINTESTINAL ENDOSCOPY N/A 8/31/2022    EGD DIAGNOSTIC ONLY performed by Yuridia Weeks MD at [address] [place] [address] Medications:   Prior to Admission medications    Medication Sig Start Date End Date Taking? Authorizing Provider   omeprazole (PRILOSEC) 20 MG delayed release capsule Take 20 mg by mouth as needed   Yes Historical Provider, MD   fluocinonide (LIDEX) 0.05 % external solution Apply to the affected areas 2 times a day on the scalp and ears for 2 weeks then with flares. May use on the affected on the upper back. 4/15/22 4/15/24 Yes Theta Krabbe, MD   ketoconazole (NIZORAL) 2 % shampoo Apply shampoo three times weekly (M, W, F) leave in 3-5 min, then rinse. May alternate with other shampoos 2/15/22  Yes Theta Krabbe, MD   fluticasone-salmeterol (ADVAIR) 100-50 MCG/DOSE diskus inhaler INHALE ONE PUFF BY MOUTH EVERY 12 HOURS 10/4/21  Yes Aj Rosa MD   fluticasone (FLONASE) 50 MCG/ACT nasal spray 1 spray by Each Nostril route 2 times daily 10/4/21  Yes Ary Rosa MD   albuterol sulfate HFA (VENTOLIN HFA) 108 (90 Base) MCG/ACT inhaler Inhale 2 puffs into the lungs every 4 hours as needed for Wheezing 10/4/21  Yes Ary Rosa MD   aspirin 81 MG EC tablet Take 1 tablet by mouth daily 5/12/21  Yes General Bough, APRN - CNP   Naproxen Sodium 220 MG CAPS Take by mouth daily as needed for Pain    Yes Historical Provider, MD   Multiple Vitamins-Minerals (CENTRUM SILVER 50+WOMEN) TABS Take by mouth daily   Yes Historical Provider, MD   Cetirizine HCl (ALL DAY ALLERGY PO) Take  by mouth daily. Yes Historical Provider, MD        Facility Administered Medications: Allergies:     Allergies   Allergen Reactions    Acetaminophen Nausea And Vomiting    Oxycodone Hcl Nausea And Vomiting    Cephalexin      Vomiting    Oxycodone     Erythromycin Nausea And Vomiting    Pcn [Penicillins] Hives and Nausea And Vomiting Vomiting and rash on penicillin as a child. Social History:      Social History     Socioeconomic History    Marital status:      Spouse name: Not on file    Number of children: 4    Years of education: 25    Highest education level: Not on file   Occupational History    Occupation: therapist     Comment: [employer]   Tobacco Use    Smoking status: Never    Smokeless tobacco: Never   Vaping Use    Vaping Use: Never used   Substance and Sexual Activity    Alcohol use: Yes     Alcohol/week: 0.0 - 1.0 standard drinks     Comment: wine occasional    Drug use: No    Sexual activity: Never   Other Topics Concern    Not on file   Social History Narrative    Not on file     Social Determinants of Health     Financial Resource Strain: Low Risk     Difficulty of Paying Living Expenses: Not hard at all   Food Insecurity: No Food Insecurity    Worried About Running Out of Food in the Last Year: Never true    Ran Out of Food in the Last Year: Never true   Transportation Needs: Not on file   Physical Activity: Not on file   Stress: Not on file   Social Connections: Not on file   Intimate Partner Violence: Not on file   Housing Stability: Not on file       Family History:        Problem Relation Age of Onset    Heart Disease Mother     Arthritis Mother     Cancer Father     Arthritis Father     Arthritis Sister     Rheum Arthritis Brother     Arthritis Brother     Asthma Brother     Asthma Other     Arthritis Brother     Arthritis Brother     Arthritis Brother     Arthritis Brother     Asthma Son     Seizures Sister     Seizures Sister      Referred by Dr. Ayesha Bonilla     Review of Systems:  Constitutional:  No night sweats, headaches, weight loss. Eyes:  No glaucoma + bilateral cataracts. ENMT:  No nosebleeds, deviated septum. Cardiac:  No arrhythmias. Vascular:  No claudication, varicosities. GI:  No PUD.  + paraesophageal hernia & 2 esophageal diverticulum, GERD  :  No kidney stones, frequent UTIs  Musculoskeletal:  No gout. + arthritis   Respiratory:  No SOB, emphysema  + asthma, users inhalers  Integumentary:  No dermatitis, itching, rash. + eczema  Neurological:  No stroke, TIAs, seizures. Psychiatric:  No depression, anxiety. Endocrine: No diabetes, thyroid issues. Hematologic:  No bleeding, easy bruising. Immunologic:  No steroid therapies. + skin cancer (removed from face)      Physical Examination:    /77 (Site: Left Upper Arm, Position: Sitting, Cuff Size: Medium Adult)   Pulse 77   Temp 97.2 °F (36.2 °C) (Temporal)   Ht 5' 2.5\" (1.588 m)   Wt 139 lb 9.6 oz (63.3 kg)   SpO2 96%   BMI 25.13 kg/m²      Admission Weight: 139 lb 9.6 oz (63.3 kg)     General appearance: NAD, well nourished  Eyes: anicteric, PERRLA  ENMT: no scars or lesions, no nasal deformity, normal dentition, no cyanosis of oral mucosa  Neck: no masses, no thyroid enlargement, no JVD. Respiratory: effort is unlabored, symmetric, no crackles, wheezes or rubs. No palpable/percussable abnormalities. Cardiovascular: regular, no murmur. PMI normal, no thrill. No carotid bruits. No edema or varicosities. Abdominal aorta cannot be appreciated given body habitus. GI: abdomen soft, nondistended, no organomegaly. No masses. Lymphatic: no cervical/supraclavicular adenopathy  Musculoskeletal: strength and tone normal. Full ROM. No scoliosis. Extremities: warm and pink. No clubbing or petechiae. Skin: no dermatitis or ulceration. No nodularity or induration. Neuro: CN grossly intact. Sensation and motor function grossly intact. Psychiatric: oriented, appropriate mood/affect. MEDICAL DECISION MAKING/TESTING  Studies personally reviewed. UGI:  FINDINGS:   There are 2 moderate to large esophageal diverticula at the distal esophagus. There is esophageal dysmotility with moderate to severe delay in emptying of   the esophagus due to a moderate to large paraesophageal hernia containing the   the gastric fundus. The visualized stomach rugae are unremarkable. Gastroesophageal reflux is   elicited during the exam.       The duodenal bulb and sweep are normal.           Impression   1. Moderate to large paraesophageal hernia containing the gastric fundus. 2. 2 moderate to large esophageal diverticula with esophageal dysmotility. Labs:   CBC: No results for input(s): WBC, HGB, HCT, MCV, PLT in the last 72 hours. BMP: No results for input(s): NA, K, CL, CO2, PHOS, BUN, CREATININE, CALCIUM, MG in the last 72 hours. Cardiac Enzymes: No results for input(s): CKTOTAL, CKMB, CKMBINDEX, TROPONINI in the last 72 hours. PT/INR: No results for input(s): PROTIME, INR in the last 72 hours. APTT: No results for input(s): APTT in the last 72 hours.   Liver Profile:  Lab Results   Component Value Date/Time    AST 48 10/04/2021 10:47 AM    ALT 35 10/04/2021 10:47 AM    BILIDIR <0.2 06/17/2019 04:30 PM    BILITOT <0.2 10/04/2021 10:47 AM    ALKPHOS 85 10/04/2021 10:47 AM     Lab Results   Component Value Date/Time    CHOL 141 05/12/2021 07:12 AM    HDL 57 05/12/2021 07:12 AM    TRIG 62 05/12/2021 07:12 AM     UA:   Lab Results   Component Value Date/Time    NITRITE neg 01/22/2018 11:33 AM    COLORU Yellow 05/11/2021 12:02 PM    PHUR 7.0 05/11/2021 12:02 PM    WBCUA None seen 05/11/2021 12:02 PM    RBCUA 3-4 05/11/2021 12:02 PM    CLARITYU Clear 05/11/2021 12:02 PM    SPECGRAV 1.010 05/11/2021 12:02 PM    LEUKOCYTESUR Negative 05/11/2021 12:02 PM    UROBILINOGEN 0.2 05/11/2021 12:02 PM    BILIRUBINUR Negative 05/11/2021 12:02 PM    BILIRUBINUR neg 01/22/2018 11:33 AM    BLOODU TRACE-INTACT 05/11/2021 12:02 PM    GLUCOSEU Negative 05/11/2021 12:02 PM       History obtained: chart, pt    Risk factors:      Diagnosis:  hiatal hernia     Plan:   Hiatal hernia repair with Nissen (pending gastric empty), and gastropexy  Followed by VATS esophageal diverticulum resection 4-6 weeks after  Will obtain gastric empty and CT chest wo prior to surgery    Typical periop/postop course reviewed including initial limitations on driving/heavy lifting. Risks, benefits and postoperative complications discussed including bleeding, infection, stroke, death, postop pulmonary and renal issues. I spent 60 minutes of care and visit with this patient, greater than 50% of time was spent in counseling and coordinating care, image interpretation, discussion with other caregiver.   And future planning    Fan Mitchell MD FACS

## 2022-09-28 NOTE — PROGRESS NOTES
Referred by Dr. Eugene Antunez    Review of Systems:  Constitutional:  No night sweats, headaches, weight loss. Eyes:  No glaucoma + bilateral cataracts. ENMT:  No nosebleeds, deviated septum. Cardiac:  No arrhythmias. Vascular:  No claudication, varicosities. GI:  No PUD. + paraesophageal hernia & 2 esophageal diverticulum, GERD  :  No kidney stones, frequent UTIs  Musculoskeletal:  No gout. + arthritis   Respiratory:  No SOB, emphysema  + asthma, users inhalers  Integumentary:  No dermatitis, itching, rash. + eczema  Neurological:  No stroke, TIAs, seizures. Psychiatric:  No depression, anxiety. Endocrine: No diabetes, thyroid issues. Hematologic:  No bleeding, easy bruising. Immunologic:  No steroid therapies.  + skin cancer (removed from face)

## 2022-10-09 SDOH — HEALTH STABILITY: PHYSICAL HEALTH: ON AVERAGE, HOW MANY DAYS PER WEEK DO YOU ENGAGE IN MODERATE TO STRENUOUS EXERCISE (LIKE A BRISK WALK)?: 0 DAYS

## 2022-10-09 SDOH — HEALTH STABILITY: PHYSICAL HEALTH: ON AVERAGE, HOW MANY MINUTES DO YOU ENGAGE IN EXERCISE AT THIS LEVEL?: 150+ MIN

## 2022-10-09 ASSESSMENT — LIFESTYLE VARIABLES
HOW OFTEN DO YOU HAVE A DRINK CONTAINING ALCOHOL: 2
HOW OFTEN DO YOU HAVE A DRINK CONTAINING ALCOHOL: MONTHLY OR LESS
HOW MANY STANDARD DRINKS CONTAINING ALCOHOL DO YOU HAVE ON A TYPICAL DAY: 1
HOW OFTEN DO YOU HAVE SIX OR MORE DRINKS ON ONE OCCASION: 1
HOW MANY STANDARD DRINKS CONTAINING ALCOHOL DO YOU HAVE ON A TYPICAL DAY: 1 OR 2

## 2022-10-09 ASSESSMENT — PATIENT HEALTH QUESTIONNAIRE - PHQ9
SUM OF ALL RESPONSES TO PHQ QUESTIONS 1-9: 0
1. LITTLE INTEREST OR PLEASURE IN DOING THINGS: 0
SUM OF ALL RESPONSES TO PHQ QUESTIONS 1-9: 0
SUM OF ALL RESPONSES TO PHQ9 QUESTIONS 1 & 2: 0
SUM OF ALL RESPONSES TO PHQ QUESTIONS 1-9: 0
2. FEELING DOWN, DEPRESSED OR HOPELESS: 0
SUM OF ALL RESPONSES TO PHQ QUESTIONS 1-9: 0

## 2022-10-10 ENCOUNTER — OFFICE VISIT (OUTPATIENT)
Dept: INTERNAL MEDICINE CLINIC | Age: 82
End: 2022-10-10
Payer: MEDICARE

## 2022-10-10 VITALS
HEIGHT: 63 IN | BODY MASS INDEX: 24.1 KG/M2 | WEIGHT: 136 LBS | OXYGEN SATURATION: 92 % | SYSTOLIC BLOOD PRESSURE: 126 MMHG | DIASTOLIC BLOOD PRESSURE: 76 MMHG | HEART RATE: 74 BPM

## 2022-10-10 DIAGNOSIS — Z00.00 ANNUAL PHYSICAL EXAM: ICD-10-CM

## 2022-10-10 DIAGNOSIS — Q39.6 ESOPHAGEAL DIVERTICULAR DISEASE: ICD-10-CM

## 2022-10-10 DIAGNOSIS — J45.30 MILD PERSISTENT ASTHMA WITHOUT COMPLICATION: ICD-10-CM

## 2022-10-10 DIAGNOSIS — K21.9 GASTROESOPHAGEAL REFLUX DISEASE WITHOUT ESOPHAGITIS: ICD-10-CM

## 2022-10-10 DIAGNOSIS — K44.9 PARAESOPHAGEAL HERNIA: ICD-10-CM

## 2022-10-10 DIAGNOSIS — J30.89 ENVIRONMENTAL AND SEASONAL ALLERGIES: ICD-10-CM

## 2022-10-10 DIAGNOSIS — Z00.00 MEDICARE ANNUAL WELLNESS VISIT, SUBSEQUENT: Primary | ICD-10-CM

## 2022-10-10 DIAGNOSIS — R21 RASH: ICD-10-CM

## 2022-10-10 PROCEDURE — 99214 OFFICE O/P EST MOD 30 MIN: CPT | Performed by: INTERNAL MEDICINE

## 2022-10-10 PROCEDURE — 1123F ACP DISCUSS/DSCN MKR DOCD: CPT | Performed by: INTERNAL MEDICINE

## 2022-10-10 PROCEDURE — G0439 PPPS, SUBSEQ VISIT: HCPCS | Performed by: INTERNAL MEDICINE

## 2022-10-10 PROCEDURE — 36415 COLL VENOUS BLD VENIPUNCTURE: CPT | Performed by: INTERNAL MEDICINE

## 2022-10-10 RX ORDER — FLUTICASONE PROPIONATE AND SALMETEROL 100; 50 UG/1; UG/1
1 POWDER RESPIRATORY (INHALATION) EVERY 12 HOURS
Qty: 1 EACH | Refills: 6 | Status: SHIPPED | OUTPATIENT
Start: 2022-10-10

## 2022-10-10 RX ORDER — OMEPRAZOLE 20 MG/1
20 CAPSULE, DELAYED RELEASE ORAL
Qty: 30 CAPSULE | Refills: 5 | Status: SHIPPED | OUTPATIENT
Start: 2022-10-10 | End: 2022-11-09

## 2022-10-10 NOTE — PROGRESS NOTES
Medicare Annual Wellness Visit    Lito Metz is here for Medicare AWV and Asthma    Assessment & Plan   Medicare annual wellness visit, subsequent    Recommendations for Preventive Services Due: see orders and patient instructions/AVS.  Recommended screening schedule for the next 5-10 years is provided to the patient in written form: see Patient Instructions/AVS.     Return for Medicare Annual Wellness Visit in 1 year. Subjective       Patient's complete Health Risk Assessment and screening values have been reviewed and are found in Flowsheets. The following problems were reviewed today and where indicated follow up appointments were made and/or referrals ordered. Positive Risk Factor Screenings with Interventions:              Health Habits/Nutrition:  Physical Activity: Inactive    Days of Exercise per Week: 0 days    Minutes of Exercise per Session: 150+ min     Have you lost any weight without trying in the past 3 months?: No  Body mass index: 24.48  Have you seen the dentist within the past year?: Yes  Health Habits/Nutrition Interventions:  Inadequate physical activity:  patient agrees to exercise for at least 150 minutes/week             Objective   Vitals:    10/10/22 0733   BP: 126/76   Pulse: 74   SpO2: 92%   Weight: 136 lb (61.7 kg)   Height: 5' 2.5\" (1.588 m)      Body mass index is 24.48 kg/m². Allergies   Allergen Reactions    Acetaminophen Nausea And Vomiting    Oxycodone Hcl Nausea And Vomiting    Cephalexin      Vomiting    Oxycodone     Erythromycin Nausea And Vomiting    Pcn [Penicillins] Hives and Nausea And Vomiting     Vomiting and rash on penicillin as a child. Prior to Visit Medications    Medication Sig Taking?  Authorizing Provider   omeprazole (PRILOSEC) 20 MG delayed release capsule Take 20 mg by mouth as needed Yes Historical Provider, MD   fluocinonide (LIDEX) 0.05 % external solution Apply to the affected areas 2 times a day on the scalp and ears for 2 weeks then with flares. May use on the affected on the upper back. Yes Lola Swan MD   ketoconazole (NIZORAL) 2 % shampoo Apply shampoo three times weekly (M, W, F) leave in 3-5 min, then rinse. May alternate with other shampoos Yes Lola Swan MD   fluticasone-salmeterol (ADVAIR) 100-50 MCG/DOSE diskus inhaler INHALE ONE PUFF BY MOUTH EVERY 12 HOURS Yes Ary Rosa MD   fluticasone (FLONASE) 50 MCG/ACT nasal spray 1 spray by Each Nostril route 2 times daily Yes Ary Rosa MD   albuterol sulfate HFA (VENTOLIN HFA) 108 (90 Base) MCG/ACT inhaler Inhale 2 puffs into the lungs every 4 hours as needed for Wheezing Yes Ary Rosa MD   aspirin 81 MG EC tablet Take 1 tablet by mouth daily Yes Deryl Deven, APRN - CNP   Naproxen Sodium 220 MG CAPS Take by mouth daily as needed for Pain  Yes Historical Provider, MD   Multiple Vitamins-Minerals (CENTRUM SILVER 50+WOMEN) TABS Take by mouth daily Yes Historical Provider, MD   Cetirizine HCl (ALL DAY ALLERGY PO) Take  by mouth daily.  Yes Historical Provider, MD Naqvi (Including outside providers/suppliers regularly involved in providing care):   Patient Care Team:  Germán Grubbs MD as PCP - General (Internal Medicine)  Germán Grubbs MD as PCP - [facility] Empaneled Provider  Adalgisa Carey MD (Orthopedic Surgery)  Belkis Reyes DPM as Consulting Physician (Podiatry)  [place], [place] as Physician Assistant (Orthopedic Surgery)     Reviewed and updated this visit:  Tobacco  Allergies  Meds  Problems  Med Hx  Surg Hx  Soc Hx  Fam Hx

## 2022-10-10 NOTE — PROGRESS NOTES
50 mg 1/2 evening as needed due to grogginess  She's tried otc melatonin but not been on any meds. Lab Results   Component Value Date    LABA1C 5.5 07/06/2020    LABA1C 5.5 12/13/2019    LABA1C 5.8 04/01/2019    LABMICR YES 05/11/2021     Lab Results   Component Value Date     10/04/2021    K 5.4 (H) 10/04/2021     10/04/2021    CO2 23 10/04/2021    BUN 12 10/04/2021    CREATININE 0.5 (L) 10/04/2021    GLUCOSE 92 10/04/2021    CALCIUM 9.2 10/04/2021     Lab Results   Component Value Date/Time    CHOL 141 05/12/2021 07:12 AM    TRIG 62 05/12/2021 07:12 AM    HDL 57 05/12/2021 07:12 AM    LDLCALC 72 05/12/2021 07:12 AM     Lab Results   Component Value Date    ALT 35 10/04/2021    AST 48 (H) 10/04/2021     Lab Results   Component Value Date    TSH 2.33 10/04/2021    TSH 1.19 09/17/2015    T4FREE 1.1 09/17/2015     Lab Results   Component Value Date    WBC 4.0 10/04/2021    HGB 12.5 10/04/2021    HCT 38.6 10/04/2021    MCV 80.7 10/04/2021     10/04/2021     Lab Results   Component Value Date    INR 1.06 05/11/2021      No results found for: PSA   No results found for: LABURIC     Patient Active Problem List   Diagnosis    Environmental and seasonal allergies    Mild persistent asthma without complication    Recurrent UTI    Hematuria    Numbness and tingling in right hand    Hammer toe of right foot    Arthritis of right knee    Status post total left knee replacement    Hiatal hernia    Gastroesophageal reflux disease without esophagitis    Ataxia    Seborrheic dermatitis       Allergies   Allergen Reactions    Acetaminophen Nausea And Vomiting    Oxycodone Hcl Nausea And Vomiting    Cephalexin      Vomiting    Oxycodone     Erythromycin Nausea And Vomiting    Pcn [Penicillins] Hives and Nausea And Vomiting     Vomiting and rash on penicillin as a child.      Outpatient Medications Marked as Taking for the 10/10/22 encounter (Office Visit) with Isadora Singletary MD   Medication Sig Dispense Refill omeprazole (PRILOSEC) 20 MG delayed release capsule Take 20 mg by mouth as needed      fluocinonide (LIDEX) 0.05 % external solution Apply to the affected areas 2 times a day on the scalp and ears for 2 weeks then with flares. May use on the affected on the upper back. 60 mL 5    ketoconazole (NIZORAL) 2 % shampoo Apply shampoo three times weekly (M, W, F) leave in 3-5 min, then rinse. May alternate with other shampoos 120 mL 11    fluticasone-salmeterol (ADVAIR) 100-50 MCG/DOSE diskus inhaler INHALE ONE PUFF BY MOUTH EVERY 12 HOURS 1 each 11    fluticasone (FLONASE) 50 MCG/ACT nasal spray 1 spray by Each Nostril route 2 times daily 1 each 2    albuterol sulfate HFA (VENTOLIN HFA) 108 (90 Base) MCG/ACT inhaler Inhale 2 puffs into the lungs every 4 hours as needed for Wheezing 1 each 2    aspirin 81 MG EC tablet Take 1 tablet by mouth daily 30 tablet 3    Naproxen Sodium 220 MG CAPS Take by mouth daily as needed for Pain       Multiple Vitamins-Minerals (CENTRUM SILVER 50+WOMEN) TABS Take by mouth daily      Cetirizine HCl (ALL DAY ALLERGY PO) Take  by mouth daily. Review of Systems: 14 systems were negative except of what was stated on HPI    Nursing note and vitals reviewed. Vitals:    10/10/22 0733   BP: 126/76   Pulse: 74   SpO2: 92%   Weight: 136 lb (61.7 kg)   Height: 5' 2.5\" (1.588 m)     Wt Readings from Last 3 Encounters:   10/10/22 136 lb (61.7 kg)   09/28/22 139 lb 9.6 oz (63.3 kg)   08/31/22 139 lb (63 kg)     BP Readings from Last 3 Encounters:   10/10/22 126/76   09/28/22 120/77   08/31/22 (!) 150/68     Body mass index is 24.48 kg/m². Constitutional: Patient appears well-developed and well-nourished. No distress. Head: Normocephalic and atraumatic. Neck: Normal range of motion. Neck supple. No thyroidmegaly. Cardiovascular: Normal rate, regular rhythm, normal heart sounds and intact distal pulses. Pulmonary/Chest: Effort normal and breath sounds normal. No stridor.  No respiratory distress. No wheezes and no rales. Abdominal: Soft. Bowel sounds are normal. No distension and no mass. No tenderness. No rebound and no guarding. Musculoskeletal: No edema and no tenderness. Skin: No rash or erythema.

## 2022-10-10 NOTE — PATIENT INSTRUCTIONS
Personalized Preventive Plan for Matthew Deng - 10/10/2022  Medicare offers a range of preventive health benefits. Some of the tests and screenings are paid in full while other may be subject to a deductible, co-insurance, and/or copay. Some of these benefits include a comprehensive review of your medical history including lifestyle, illnesses that may run in your family, and various assessments and screenings as appropriate. After reviewing your medical record and screening and assessments performed today your provider may have ordered immunizations, labs, imaging, and/or referrals for you. A list of these orders (if applicable) as well as your Preventive Care list are included within your After Visit Summary for your review. Other Preventive Recommendations:    A preventive eye exam performed by an eye specialist is recommended every 1-2 years to screen for glaucoma; cataracts, macular degeneration, and other eye disorders. A preventive dental visit is recommended every 6 months. Try to get at least 150 minutes of exercise per week or 10,000 steps per day on a pedometer . Order or download the FREE \"Exercise & Physical Activity: Your Everyday Guide\" from [employer]. Call 7-592.936.7775 or search [employer] online. You need 8644-9029 mg of calcium and 9919-6711 IU of vitamin D per day. It is possible to meet your calcium requirement with diet alone, but a vitamin D supplement is usually necessary to meet this goal.  When exposed to the sun, use a sunscreen that protects against both UVA and UVB radiation with an SPF of 30 or greater. Reapply every 2 to 3 hours or after sweating, drying off with a towel, or swimming. Always wear a seat belt when traveling in a car. Always wear a helmet when riding a bicycle or motorcycle.

## 2022-10-11 LAB
A/G RATIO: 1.5 (ref 1.1–2.2)
ALBUMIN SERPL-MCNC: 4.3 G/DL (ref 3.4–5)
ALP BLD-CCNC: 70 U/L (ref 40–129)
ALT SERPL-CCNC: 13 U/L (ref 10–40)
ANION GAP SERPL CALCULATED.3IONS-SCNC: 11 MMOL/L (ref 3–16)
AST SERPL-CCNC: 19 U/L (ref 15–37)
BASOPHILS ABSOLUTE: 0.1 K/UL (ref 0–0.2)
BASOPHILS RELATIVE PERCENT: 1.3 %
BILIRUB SERPL-MCNC: 0.4 MG/DL (ref 0–1)
BUN BLDV-MCNC: 15 MG/DL (ref 7–20)
CALCIUM SERPL-MCNC: 9.6 MG/DL (ref 8.3–10.6)
CHLORIDE BLD-SCNC: 105 MMOL/L (ref 99–110)
CHOLESTEROL, TOTAL: 185 MG/DL (ref 0–199)
CO2: 26 MMOL/L (ref 21–32)
CREAT SERPL-MCNC: 0.6 MG/DL (ref 0.6–1.2)
EOSINOPHILS ABSOLUTE: 0.4 K/UL (ref 0–0.6)
EOSINOPHILS RELATIVE PERCENT: 8.8 %
GFR AFRICAN AMERICAN: >60
GFR NON-AFRICAN AMERICAN: >60
GLUCOSE BLD-MCNC: 93 MG/DL (ref 70–99)
HCT VFR BLD CALC: 39.2 % (ref 36–48)
HDLC SERPL-MCNC: 66 MG/DL (ref 40–60)
HEMOGLOBIN: 13.1 G/DL (ref 12–16)
LDL CHOLESTEROL CALCULATED: 101 MG/DL
LYMPHOCYTES ABSOLUTE: 1.2 K/UL (ref 1–5.1)
LYMPHOCYTES RELATIVE PERCENT: 26.1 %
MCH RBC QN AUTO: 27.9 PG (ref 26–34)
MCHC RBC AUTO-ENTMCNC: 33.5 G/DL (ref 31–36)
MCV RBC AUTO: 83 FL (ref 80–100)
MONOCYTES ABSOLUTE: 0.5 K/UL (ref 0–1.3)
MONOCYTES RELATIVE PERCENT: 10.5 %
NEUTROPHILS ABSOLUTE: 2.5 K/UL (ref 1.7–7.7)
NEUTROPHILS RELATIVE PERCENT: 53.3 %
PDW BLD-RTO: 15.5 % (ref 12.4–15.4)
PLATELET # BLD: 244 K/UL (ref 135–450)
PMV BLD AUTO: 10 FL (ref 5–10.5)
POTASSIUM SERPL-SCNC: 5.1 MMOL/L (ref 3.5–5.1)
RBC # BLD: 4.72 M/UL (ref 4–5.2)
SODIUM BLD-SCNC: 142 MMOL/L (ref 136–145)
TOTAL PROTEIN: 7.2 G/DL (ref 6.4–8.2)
TRIGL SERPL-MCNC: 89 MG/DL (ref 0–150)
VLDLC SERPL CALC-MCNC: 18 MG/DL
WBC # BLD: 4.8 K/UL (ref 4–11)

## 2022-10-17 ENCOUNTER — HOSPITAL ENCOUNTER (OUTPATIENT)
Dept: WOMENS IMAGING | Age: 82
Discharge: HOME | End: 2022-10-17
Payer: MEDICARE

## 2022-10-17 DIAGNOSIS — Z13.820 SCREENING FOR OSTEOPOROSIS: ICD-10-CM

## 2022-10-17 DIAGNOSIS — Z78.0 POSTMENOPAUSE: ICD-10-CM

## 2022-10-17 PROCEDURE — 77080 DXA BONE DENSITY AXIAL: CPT

## 2022-11-07 ENCOUNTER — TELEPHONE (OUTPATIENT)
Dept: CARDIOTHORACIC SURGERY | Age: 82
End: 2022-11-07

## 2022-11-07 NOTE — TELEPHONE ENCOUNTER
Called patient to check in regarding decision on having surgery or not. She states she is \"going through the process of things\" related to this. She informed me that I do not have to call her again. She has our number if she wants to proceed to with surgery.

## 2023-06-06 NOTE — PROGRESS NOTES
Component Value Date    TSH 1.19 09/17/2015    T4FREE 1.1 09/17/2015     Lab Results   Component Value Date    WBC 5.1 05/11/2021    HGB 12.5 05/11/2021    HCT 39.0 05/11/2021    MCV 79.1 (L) 05/11/2021     05/11/2021     Lab Results   Component Value Date    INR 1.06 05/11/2021      No results found for: PSA   No results found for: [facility]     Patient Active Problem List   Diagnosis    Environmental and seasonal allergies    Mild persistent asthma without complication    Recurrent UTI    Hematuria    Numbness and tingling in right hand    Hammer toe of right foot    Arthritis of right knee    Status post total left knee replacement    Hiatal hernia    Gastroesophageal reflux disease without esophagitis    Ataxia       Allergies   Allergen Reactions    Oxycodone Hcl Nausea And Vomiting    Cephalexin      Vomiting    Oxycodone     Erythromycin Nausea And Vomiting     No outpatient medications have been marked as taking for the 9/27/21 encounter (Virtual Visit) with Elaine Vasquez MD.         Review of Systems: 14 systems were negative except of what was stated on HPI    Nursing note and vitals reviewed. There were no vitals filed for this visit. Wt Readings from Last 3 Encounters:   05/11/21 152 lb 3.2 oz (69 kg)   04/12/21 156 lb (70.8 kg)   12/11/20 145 lb (65.8 kg)     BP Readings from Last 3 Encounters:   05/12/21 (!) 148/79   04/12/21 132/84   03/22/21 (!) 153/85     There is no height or weight on file to calculate BMI. Constitutional: Patient appears well-developed and well-nourished. No distress. Head: Normocephalic and atraumatic. Skin: No rash or erythema. Psychiatric: Normal mood and affect.  Behavior is normal. lumbar spine/Bilateral:

## 2023-06-12 DIAGNOSIS — Q39.6 ESOPHAGEAL DIVERTICULAR DISEASE: ICD-10-CM

## 2023-06-12 DIAGNOSIS — K21.9 GASTROESOPHAGEAL REFLUX DISEASE WITHOUT ESOPHAGITIS: ICD-10-CM

## 2023-06-12 DIAGNOSIS — K44.9 PARAESOPHAGEAL HERNIA: ICD-10-CM

## 2023-06-12 RX ORDER — OMEPRAZOLE 20 MG/1
CAPSULE, DELAYED RELEASE ORAL
Qty: 30 CAPSULE | Refills: 4 | Status: SHIPPED | OUTPATIENT
Start: 2023-06-12

## 2023-09-15 ENCOUNTER — OFFICE VISIT (OUTPATIENT)
Dept: ORTHOPEDIC SURGERY | Age: 83
End: 2023-09-15

## 2023-09-15 VITALS — WEIGHT: 136 LBS | HEIGHT: 62 IN | BODY MASS INDEX: 25.03 KG/M2

## 2023-09-15 DIAGNOSIS — M25.562 ACUTE PAIN OF BOTH KNEES: ICD-10-CM

## 2023-09-15 DIAGNOSIS — M25.561 ACUTE PAIN OF BOTH KNEES: ICD-10-CM

## 2023-09-15 DIAGNOSIS — M17.12 PRIMARY OSTEOARTHRITIS OF LEFT KNEE: Primary | ICD-10-CM

## 2023-09-15 RX ORDER — LIDOCAINE HYDROCHLORIDE 10 MG/ML
4 INJECTION, SOLUTION EPIDURAL; INFILTRATION; INTRACAUDAL; PERINEURAL ONCE
Status: COMPLETED | OUTPATIENT
Start: 2023-09-15 | End: 2023-09-15

## 2023-09-15 RX ORDER — TRIAMCINOLONE ACETONIDE 40 MG/ML
40 INJECTION, SUSPENSION INTRA-ARTICULAR; INTRAMUSCULAR ONCE
Status: COMPLETED | OUTPATIENT
Start: 2023-09-15 | End: 2023-09-15

## 2023-09-15 RX ADMIN — LIDOCAINE HYDROCHLORIDE 4 ML: 10 INJECTION, SOLUTION EPIDURAL; INFILTRATION; INTRACAUDAL; PERINEURAL at 10:37

## 2023-09-15 RX ADMIN — TRIAMCINOLONE ACETONIDE 40 MG: 40 INJECTION, SUSPENSION INTRA-ARTICULAR; INTRAMUSCULAR at 10:40

## 2023-09-21 NOTE — PROGRESS NOTES
Admin Date  09/15/2023  10:40 Action  Given Dose  40 mg Route  IntraMUSCular Site  Knee Left Administered By  Helena Fournier    Ordering Provider: Abran Dance, MD    NDC: 1280-6351-39    Lot#: [id number]    : [facility] (PRIMARY CARE)    Patient Supplied?: No                         Orders Placed This Encounter   Procedures    XR KNEE RIGHT (3 VIEWS)     Standing Status:   Future     Number of Occurrences:   1     Standing Expiration Date:   9/11/2024     Order Specific Question:   Reason for exam:     Answer:   pain    XR KNEE LEFT (MIN 4 VIEWS)     Standing Status:   Future     Number of Occurrences:   1     Standing Expiration Date:   9/11/2024     Order Specific Question:   Reason for exam:     Answer:   pain    20610 - FL DRAIN/INJECT LARGE JOINT/BURSA       Assessment and Plan  Temi Serrano was seen today for knee pain. Diagnoses and all orders for this visit:    Primary osteoarthritis of left knee  -     20610 - FL DRAIN/INJECT LARGE JOINT/BURSA  -     lidocaine PF 1 % injection 4 mL  -     triamcinolone acetonide (KENALOG-40) injection 40 mg    Acute pain of both knees  -     XR KNEE RIGHT (3 VIEWS); Future  -     XR KNEE LEFT (MIN 4 VIEWS); Future      L knee OA that has been managed well with steroid injections, will plan to continue these PRN and consider visco if they are no longer effective. Electronically signed by Abran Dance, MD on 7/59/0856 at 12:18 PM  This dictation was generated by voice recognition computer software. Although all attempts are made to edit the dictation for accuracy, there may be errors in the transcription that are not intended. Total time spent reviewing past notes, imaging, labs, clinical exam, and treatment plan was > 45 min.

## 2023-09-26 DIAGNOSIS — K44.9 PARAESOPHAGEAL HERNIA: ICD-10-CM

## 2023-09-26 DIAGNOSIS — K21.9 GASTROESOPHAGEAL REFLUX DISEASE WITHOUT ESOPHAGITIS: ICD-10-CM

## 2023-09-26 DIAGNOSIS — Q39.6 ESOPHAGEAL DIVERTICULAR DISEASE: ICD-10-CM

## 2023-09-26 RX ORDER — OMEPRAZOLE 20 MG/1
20 CAPSULE, DELAYED RELEASE ORAL
Qty: 30 CAPSULE | Refills: 0 | Status: SHIPPED | OUTPATIENT
Start: 2023-09-26

## 2023-09-26 NOTE — TELEPHONE ENCOUNTER
Refill request for omeprazole (PRILOSEC) 20 MG delayed release capsule medication. Name of [address] [place] [address]      Last visit - 9/15/23     Pending visit - 10/16/23    Last refill - 6/12/23      Medication Contract signed - PDMP Monitoring:    Last PDMP Emiliano Solano as Reviewed:  Review User Review Instant Review Result          [unfilled]  Urine Drug Screenings (1 yr)    No resulted procedures found.        Medication Contract and Consent for Opioid Use Documents Filed        No documents found                   Last [employer]-         Additional Comments

## 2023-10-16 ENCOUNTER — OFFICE VISIT (OUTPATIENT)
Dept: INTERNAL MEDICINE CLINIC | Age: 83
End: 2023-10-16
Payer: MEDICARE

## 2023-10-16 VITALS
HEIGHT: 62 IN | HEART RATE: 88 BPM | BODY MASS INDEX: 26.13 KG/M2 | SYSTOLIC BLOOD PRESSURE: 118 MMHG | OXYGEN SATURATION: 92 % | DIASTOLIC BLOOD PRESSURE: 68 MMHG | WEIGHT: 142 LBS

## 2023-10-16 DIAGNOSIS — K21.9 GASTROESOPHAGEAL REFLUX DISEASE WITHOUT ESOPHAGITIS: ICD-10-CM

## 2023-10-16 DIAGNOSIS — J45.909 ASTHMA IN ADULT WITHOUT COMPLICATION, UNSPECIFIED ASTHMA SEVERITY, UNSPECIFIED WHETHER PERSISTENT: ICD-10-CM

## 2023-10-16 DIAGNOSIS — L21.9 SEBORRHEIC DERMATITIS: ICD-10-CM

## 2023-10-16 DIAGNOSIS — Z00.00 MEDICARE ANNUAL WELLNESS VISIT, SUBSEQUENT: Primary | ICD-10-CM

## 2023-10-16 DIAGNOSIS — K44.9 PARAESOPHAGEAL HERNIA: ICD-10-CM

## 2023-10-16 DIAGNOSIS — Q39.6 ESOPHAGEAL DIVERTICULAR DISEASE: ICD-10-CM

## 2023-10-16 PROCEDURE — 99214 OFFICE O/P EST MOD 30 MIN: CPT | Performed by: INTERNAL MEDICINE

## 2023-10-16 PROCEDURE — G0439 PPPS, SUBSEQ VISIT: HCPCS | Performed by: INTERNAL MEDICINE

## 2023-10-16 PROCEDURE — 1123F ACP DISCUSS/DSCN MKR DOCD: CPT | Performed by: INTERNAL MEDICINE

## 2023-10-16 RX ORDER — FLUTICASONE FUROATE, UMECLIDINIUM BROMIDE AND VILANTEROL TRIFENATATE 100; 62.5; 25 UG/1; UG/1; UG/1
1 POWDER RESPIRATORY (INHALATION) DAILY
Qty: 1 EACH | Refills: 0 | Status: SHIPPED | OUTPATIENT
Start: 2023-10-16

## 2023-10-16 RX ORDER — OMEPRAZOLE 40 MG/1
40 CAPSULE, DELAYED RELEASE ORAL
Qty: 30 CAPSULE | Refills: 0 | Status: SHIPPED | OUTPATIENT
Start: 2023-10-16

## 2023-10-16 RX ORDER — KETOCONAZOLE 20 MG/ML
SHAMPOO TOPICAL
Qty: 120 ML | Refills: 2 | Status: SHIPPED | OUTPATIENT
Start: 2023-10-16

## 2023-10-16 SDOH — ECONOMIC STABILITY: FOOD INSECURITY: WITHIN THE PAST 12 MONTHS, YOU WORRIED THAT YOUR FOOD WOULD RUN OUT BEFORE YOU GOT MONEY TO BUY MORE.: NEVER TRUE

## 2023-10-16 SDOH — ECONOMIC STABILITY: INCOME INSECURITY: HOW HARD IS IT FOR YOU TO PAY FOR THE VERY BASICS LIKE FOOD, HOUSING, MEDICAL CARE, AND HEATING?: NOT HARD AT ALL

## 2023-10-16 SDOH — ECONOMIC STABILITY: FOOD INSECURITY: WITHIN THE PAST 12 MONTHS, THE FOOD YOU BOUGHT JUST DIDN'T LAST AND YOU DIDN'T HAVE MONEY TO GET MORE.: NEVER TRUE

## 2023-10-16 SDOH — ECONOMIC STABILITY: HOUSING INSECURITY
IN THE LAST 12 MONTHS, WAS THERE A TIME WHEN YOU DID NOT HAVE A STEADY PLACE TO SLEEP OR SLEPT IN A SHELTER (INCLUDING NOW)?: NO

## 2023-10-16 ASSESSMENT — PATIENT HEALTH QUESTIONNAIRE - PHQ9
SUM OF ALL RESPONSES TO PHQ QUESTIONS 1-9: 0
2. FEELING DOWN, DEPRESSED OR HOPELESS: 0
1. LITTLE INTEREST OR PLEASURE IN DOING THINGS: 0
SUM OF ALL RESPONSES TO PHQ QUESTIONS 1-9: 0
SUM OF ALL RESPONSES TO PHQ9 QUESTIONS 1 & 2: 0

## 2023-10-16 ASSESSMENT — LIFESTYLE VARIABLES
HOW MANY STANDARD DRINKS CONTAINING ALCOHOL DO YOU HAVE ON A TYPICAL DAY: PATIENT DOES NOT DRINK
HOW OFTEN DO YOU HAVE A DRINK CONTAINING ALCOHOL: NEVER

## 2023-10-16 NOTE — PROGRESS NOTES
Landy Brannon  YOB: 1940    Date of Service:  10/16/2023    Chief Complaint:      Chief Complaint   Patient presents with    Medicare AWV    Asthma    Gastroesophageal Reflux       Assessment/Plan:  Yoly Da Silva was seen today for medicare awv. Diagnoses and all orders for this visit:    Medicare annual wellness visit, subsequent    Gastroesophageal reflux disease without esophagitis  Increase omeprazole (PRILOSEC) 40 MG delayed release capsule; Take 1 capsule by mouth every morning (before breakfast)    Esophageal diverticular disease  Increase omeprazole (PRILOSEC) 40 MG delayed release capsule; Take 1 capsule by mouth every morning (before breakfast)    Paraesophageal hernia  Increase omeprazole (PRILOSEC) 40 MG delayed release capsule; Take 1 capsule by mouth every morning (before breakfast)    Mild persistent asthma without complication  Start fluticasone-umeclidin-vilant (TRELEGY ELLIPTA) 100-62.5-25 MCG/ACT AEPB inhaler; Inhale 1 puff into the lungs daily    Seborrheic dermatitis  -     ketoconazole (NIZORAL) 2 % shampoo; Apply shampoo three times weekly (M, W, F) leave in 3-5 min, then rinse. May alternate with other shampoos    Stable and continue on current medications. Return VV GERD and Asthma 11/14 at 1:30 PM.      HPI:  Landy Brannon is a 79 y.o.    Mild Asthma:  Still shortness of breath at the end of the day on Advair 100/50 qd and ventolin prn  Allergies:  Stable on Zyrtec 10 mg qd and Flonase 1-2 spray prn. GERD with esophageal diverticulum/paraesophageal hernia: still having symptoms daily on Prilosec 20 mg daily. Insomnia:  stable and not want to be on Trazodone 50 mg 1/2 evening as needed due to grogginess  She's tried otc melatonin but not been on any meds.       Lab Results   Component Value Date    LABA1C 5.5 07/06/2020    LABA1C 5.5 12/13/2019    LABA1C 5.8 04/01/2019     Lab Results   Component Value Date     10/10/2022    K 5.1 10/10/2022     10/10/2022    CO2
Yes Nicolas Rosa MD   albuterol sulfate HFA (VENTOLIN HFA) 108 (90 Base) MCG/ACT inhaler Inhale 2 puffs into the lungs every 4 hours as needed for Wheezing Yes Nicolas Rosa MD   aspirin 81 MG EC tablet Take 1 tablet by mouth daily Yes NARESH Du - CNP   Naproxen Sodium 220 MG CAPS Take by mouth daily as needed for Pain  Yes ProviderMitchell MD   Multiple Vitamins-Minerals (CENTRUM SILVER 50+WOMEN) TABS Take by mouth daily Yes Provider, MD Mitchell   Cetirizine HCl (ALL DAY ALLERGY PO) Take  by mouth daily. Yes ProviderMitchell MD   fluocinonide (LIDEX) 0.05 % external solution Apply to the affected areas 2 times a day on the scalp and ears for 2 weeks then with flares. May use on the affected on the upper back.   Patient not taking: Reported on 10/16/2023  Javy Guzman MD       [facility] (Including outside providers/suppliers regularly involved in providing care):   Patient Care Team:  Nicolas Rosa MD as PCP - General (Internal Medicine)  Nicolas Rosa MD as PCP - Empaneled Provider  Marion Cortez MD (Orthopedic Surgery)  Mitchel Umaña DPM as Consulting Physician (Podiatry)  Jazmin Perez PA-C as Physician Assistant (Orthopedic Surgery)     Reviewed and updated this visit:  Tobacco  Allergies  Meds  Problems  Med Hx  Surg Hx  Soc Hx  Fam Hx

## 2023-11-14 ENCOUNTER — TELEMEDICINE (OUTPATIENT)
Dept: INTERNAL MEDICINE CLINIC | Age: 83
End: 2023-11-14
Payer: MEDICARE

## 2023-11-14 DIAGNOSIS — Z12.11 SCREEN FOR COLON CANCER: ICD-10-CM

## 2023-11-14 DIAGNOSIS — J30.81 ALLERGIC RHINITIS DUE TO ANIMAL HAIR AND DANDER: ICD-10-CM

## 2023-11-14 DIAGNOSIS — J98.4 RESTRICTIVE LUNG DISEASE: ICD-10-CM

## 2023-11-14 DIAGNOSIS — R09.81 NASAL CONGESTION: ICD-10-CM

## 2023-11-14 DIAGNOSIS — Q39.6 ESOPHAGEAL DIVERTICULAR DISEASE: ICD-10-CM

## 2023-11-14 DIAGNOSIS — K44.9 PARAESOPHAGEAL HERNIA: ICD-10-CM

## 2023-11-14 DIAGNOSIS — R09.82 POSTNASAL DRIP: ICD-10-CM

## 2023-11-14 DIAGNOSIS — K21.9 GASTROESOPHAGEAL REFLUX DISEASE WITHOUT ESOPHAGITIS: ICD-10-CM

## 2023-11-14 DIAGNOSIS — J45.909 ASTHMA IN ADULT WITHOUT COMPLICATION, UNSPECIFIED ASTHMA SEVERITY, UNSPECIFIED WHETHER PERSISTENT: Primary | ICD-10-CM

## 2023-11-14 DIAGNOSIS — K59.09 OTHER CONSTIPATION: ICD-10-CM

## 2023-11-14 DIAGNOSIS — L21.9 SEBORRHEIC DERMATITIS: ICD-10-CM

## 2023-11-14 PROCEDURE — 99214 OFFICE O/P EST MOD 30 MIN: CPT | Performed by: INTERNAL MEDICINE

## 2023-11-14 PROCEDURE — 1123F ACP DISCUSS/DSCN MKR DOCD: CPT | Performed by: INTERNAL MEDICINE

## 2023-11-14 RX ORDER — FLUTICASONE FUROATE, UMECLIDINIUM BROMIDE AND VILANTEROL TRIFENATATE 200; 62.5; 25 UG/1; UG/1; UG/1
1 POWDER RESPIRATORY (INHALATION) DAILY
Qty: 1 EACH | Refills: 0 | Status: SHIPPED | OUTPATIENT
Start: 2023-11-14

## 2023-11-14 RX ORDER — FLUTICASONE PROPIONATE 50 MCG
1 SPRAY, SUSPENSION (ML) NASAL 2 TIMES DAILY
Qty: 3 EACH | Refills: 3 | Status: SHIPPED | OUTPATIENT
Start: 2023-11-14

## 2023-11-14 RX ORDER — ALBUTEROL SULFATE 90 UG/1
2 AEROSOL, METERED RESPIRATORY (INHALATION) 4 TIMES DAILY PRN
Qty: 18 G | Refills: 2 | Status: SHIPPED | OUTPATIENT
Start: 2023-11-14

## 2023-11-14 RX ORDER — OMEPRAZOLE 40 MG/1
40 CAPSULE, DELAYED RELEASE ORAL
Qty: 90 CAPSULE | Refills: 3 | Status: SHIPPED | OUTPATIENT
Start: 2023-11-14

## 2023-11-14 NOTE — PROGRESS NOTES
Patient was evaluated through a synchronous (real-time) video encounter. Patient identification was verified at the start of the visit. She (or guardian if applicable) is aware that this is a billable service, which includes applicable co-pays. This visit was conducted with the patient's (and/or legal guardian's) verbal consent. She has not had a related appointment within my department in the past 7 days or scheduled within the next 24 hours. The patient was located at Home: [address] [place] [address] [facility] [id number]. The provider was located at Home ([address] [place] [address]): [place]. Date of Service:  11/14/2023    Chief Complaint:      Chief Complaint   Patient presents with    Gastroesophageal Reflux    Asthma       Assessment/Plan:    Renee Lynn was seen today for gastroesophageal reflux and asthma. Diagnoses and all orders for this visit:    Asthma in adult without complication, unspecified asthma severity, unspecified whether persistent  Increase  fluticasone-umeclidin-vilant (TRELEGY ELLIPTA) 200-62.5-25 MCG/ACT AEPB inhaler; Inhale 1 puff into the lungs daily  -     albuterol sulfate HFA (VENTOLIN HFA) 108 (90 Base) MCG/ACT inhaler; Inhale 2 puffs into the lungs 4 times daily as needed for Wheezing    Restrictive lung disease  Increase  fluticasone-umeclidin-vilant (TRELEGY ELLIPTA) 200-62.5-25 MCG/ACT AEPB inhaler; Inhale 1 puff into the lungs daily  -     albuterol sulfate HFA (VENTOLIN HFA) 108 (90 Base) MCG/ACT inhaler; Inhale 2 puffs into the lungs 4 times daily as needed for Wheezing    Gastroesophageal reflux disease without esophagitis  -     omeprazole (PRILOSEC) 40 MG delayed release capsule; Take 1 capsule by mouth every morning (before breakfast)    Esophageal diverticular disease  -     omeprazole (PRILOSEC) 40 MG delayed release capsule; Take 1 capsule by mouth every morning (before breakfast)    Paraesophageal hernia  -     omeprazole (PRILOSEC) 40 MG delayed release capsule;  Take 1 capsule by mouth

## 2023-12-12 ENCOUNTER — TELEMEDICINE (OUTPATIENT)
Dept: INTERNAL MEDICINE CLINIC | Age: 83
End: 2023-12-12
Payer: MEDICARE

## 2023-12-12 DIAGNOSIS — K59.09 OTHER CONSTIPATION: ICD-10-CM

## 2023-12-12 DIAGNOSIS — L21.9 SEBORRHEIC DERMATITIS: ICD-10-CM

## 2023-12-12 DIAGNOSIS — J98.4 RESTRICTIVE LUNG DISEASE: ICD-10-CM

## 2023-12-12 DIAGNOSIS — J45.909 ASTHMA IN ADULT WITHOUT COMPLICATION, UNSPECIFIED ASTHMA SEVERITY, UNSPECIFIED WHETHER PERSISTENT: Primary | ICD-10-CM

## 2023-12-12 DIAGNOSIS — K44.9 PARAESOPHAGEAL HERNIA: ICD-10-CM

## 2023-12-12 PROCEDURE — 1123F ACP DISCUSS/DSCN MKR DOCD: CPT | Performed by: INTERNAL MEDICINE

## 2023-12-12 PROCEDURE — 99214 OFFICE O/P EST MOD 30 MIN: CPT | Performed by: INTERNAL MEDICINE

## 2023-12-12 RX ORDER — KETOCONAZOLE 20 MG/ML
SHAMPOO TOPICAL
Qty: 120 ML | Refills: 10 | Status: SHIPPED | OUTPATIENT
Start: 2023-12-12

## 2023-12-12 RX ORDER — POLYETHYLENE GLYCOL 3350 17 G/17G
POWDER, FOR SOLUTION ORAL
Qty: 510 G | Refills: 0 | COMMUNITY
Start: 2023-12-12

## 2023-12-12 RX ORDER — FLUTICASONE FUROATE, UMECLIDINIUM BROMIDE AND VILANTEROL TRIFENATATE 200; 62.5; 25 UG/1; UG/1; UG/1
1 POWDER RESPIRATORY (INHALATION) DAILY
Qty: 1 EACH | Refills: 3 | Status: SHIPPED | OUTPATIENT
Start: 2023-12-12

## 2023-12-12 NOTE — PROGRESS NOTES
Patient was evaluated through a synchronous (real-time) video encounter. Patient identification was verified at the start of the visit. She (or guardian if applicable) is aware that this is a billable service, which includes applicable co-pays. This visit was conducted with the patient's (and/or legal guardian's) verbal consent. She has not had a related appointment within my department in the past 7 days or scheduled within the next 24 hours. The patient was located at Home: [address] [place] [address]. The provider was located at Home ([address] [place] [address]): [place]. Date of Service:  12/12/2023    Chief Complaint:      Chief Complaint   Patient presents with    Asthma    Constipation       Assessment/Plan:    Jennifer Ahmadi was seen today for asthma and constipation. Diagnoses and all orders for this visit:    Asthma in adult without complication, unspecified asthma severity, unspecified whether persistent  -     fluticasone-umeclidin-vilant (TRELEGY ELLIPTA) 200-62.5-25 MCG/ACT AEPB inhaler; Inhale 1 puff into the lungs daily    Restrictive lung disease  -     fluticasone-umeclidin-vilant (TRELEGY ELLIPTA) 200-62.5-25 MCG/ACT AEPB inhaler; Inhale 1 puff into the lungs daily    Seborrheic dermatitis  -     ketoconazole (NIZORAL) 2 % shampoo; Apply shampoo three times weekly (M, W, F) leave in 3-5 min, then rinse. May alternate with other shampoos    Other constipation    Paraesophageal hernia    Stable and continue on current medications. Return VV Lung ds, Constipation, seb derm 4/9. HPI:  Kimo Serrano is a 79 y.o. Constipation:  Dr. Carito Palencia wants to hold off on colonoscopy and ? EGD for dysphagia. Mild Asthma with Restrictive [place] ds:  much improved on Trelegy 200 qd and ventolin prn  Allergies:  Stable on Zyrtec 10 mg qd and Flonase 1-2 spray prn. GERD with esophageal diverticulum/paraesophageal hernia: asymptomatic with increase Prilosec 40 mg daily.   Insomnia:  stable and not want to be

## 2024-01-22 ENCOUNTER — OFFICE VISIT (OUTPATIENT)
Dept: INTERNAL MEDICINE CLINIC | Age: 84
End: 2024-01-22
Payer: MEDICARE

## 2024-01-22 VITALS
DIASTOLIC BLOOD PRESSURE: 68 MMHG | TEMPERATURE: 95.9 F | SYSTOLIC BLOOD PRESSURE: 140 MMHG | WEIGHT: 142 LBS | BODY MASS INDEX: 26.13 KG/M2 | HEIGHT: 62 IN

## 2024-01-22 DIAGNOSIS — R03.0 ELEVATED BP WITHOUT DIAGNOSIS OF HYPERTENSION: ICD-10-CM

## 2024-01-22 DIAGNOSIS — R35.0 URINE FREQUENCY: Primary | ICD-10-CM

## 2024-01-22 LAB
BILIRUBIN, POC: NEGATIVE
BLOOD URINE, POC: NORMAL
CLARITY, POC: NORMAL
COLOR, POC: YELLOW
GLUCOSE URINE, POC: NEGATIVE
KETONES, POC: NEGATIVE
LEUKOCYTE EST, POC: NORMAL
NITRITE, POC: NEGATIVE
PH, POC: 6
PROTEIN, POC: NEGATIVE
SPECIFIC GRAVITY, POC: 1.01
UROBILINOGEN, POC: 0.2

## 2024-01-22 PROCEDURE — 1123F ACP DISCUSS/DSCN MKR DOCD: CPT | Performed by: INTERNAL MEDICINE

## 2024-01-22 PROCEDURE — 99213 OFFICE O/P EST LOW 20 MIN: CPT | Performed by: INTERNAL MEDICINE

## 2024-01-22 PROCEDURE — 81002 URINALYSIS NONAUTO W/O SCOPE: CPT | Performed by: INTERNAL MEDICINE

## 2024-01-22 RX ORDER — SULFAMETHOXAZOLE AND TRIMETHOPRIM 800; 160 MG/1; MG/1
1 TABLET ORAL 2 TIMES DAILY
Qty: 10 TABLET | Refills: 0 | Status: SHIPPED | OUTPATIENT
Start: 2024-01-22 | End: 2024-01-27

## 2024-01-22 ASSESSMENT — PATIENT HEALTH QUESTIONNAIRE - PHQ9
1. LITTLE INTEREST OR PLEASURE IN DOING THINGS: 0
SUM OF ALL RESPONSES TO PHQ QUESTIONS 1-9: 0
SUM OF ALL RESPONSES TO PHQ QUESTIONS 1-9: 0
SUM OF ALL RESPONSES TO PHQ9 QUESTIONS 1 & 2: 0
2. FEELING DOWN, DEPRESSED OR HOPELESS: 0
SUM OF ALL RESPONSES TO PHQ QUESTIONS 1-9: 0
SUM OF ALL RESPONSES TO PHQ QUESTIONS 1-9: 0

## 2024-01-22 NOTE — PROGRESS NOTES
01/22/24 1047   BP: (!) 140/68   Temp: (!) 95.9 °F (35.5 °C)   Weight: 64.4 kg (142 lb)   Height: 1.575 m (5' 2\")     Wt Readings from Last 3 Encounters:   01/22/24 64.4 kg (142 lb)   10/16/23 64.4 kg (142 lb)   09/15/23 61.7 kg (136 lb)     BP Readings from Last 3 Encounters:   01/22/24 (!) 140/68   10/16/23 118/68   10/10/22 126/76     Body mass index is 25.97 kg/m².  Constitutional: Patient appears well-developed and well-nourished. No distress.   Head: Normocephalic and atraumatic.   Neck: Normal range of motion. Neck supple. No thyroidmegaly.   Cardiovascular: Normal rate, regular rhythm, normal heart sounds and intact distal pulses.   Pulmonary/Chest: Effort normal and breath sounds normal. No stridor. No respiratory distress. No wheezes and no rales.   Abdominal: Soft. Bowel sounds are normal. No distension and no mass. No tenderness. No rebound and no guarding.   Musculoskeletal: No edema and no tenderness.   Skin: No rash or erythema.

## 2024-01-31 ENCOUNTER — TELEPHONE (OUTPATIENT)
Dept: INTERNAL MEDICINE CLINIC | Age: 84
End: 2024-01-31

## 2024-01-31 NOTE — TELEPHONE ENCOUNTER
HARSHA  Here 122-24 for UTI, only took four days of meds due to side effects-nausea, no appetite and thinks it raised her B/P.. She does not think UTI is gone. Scheduled appt for 2-1-24 at 10:00 am    B/P still high   147/78-today  1-  159/70 in AM, 146/85 pm

## 2024-02-01 ENCOUNTER — OFFICE VISIT (OUTPATIENT)
Dept: INTERNAL MEDICINE CLINIC | Age: 84
End: 2024-02-01
Payer: MEDICARE

## 2024-02-01 VITALS
BODY MASS INDEX: 25.79 KG/M2 | WEIGHT: 141 LBS | DIASTOLIC BLOOD PRESSURE: 70 MMHG | HEART RATE: 84 BPM | SYSTOLIC BLOOD PRESSURE: 132 MMHG

## 2024-02-01 DIAGNOSIS — R53.1 WEAKNESS: ICD-10-CM

## 2024-02-01 DIAGNOSIS — E55.9 VITAMIN D DEFICIENCY: ICD-10-CM

## 2024-02-01 DIAGNOSIS — R35.0 URINE FREQUENCY: Primary | ICD-10-CM

## 2024-02-01 DIAGNOSIS — R82.998 LEUKOCYTES IN URINE: ICD-10-CM

## 2024-02-01 LAB
BILIRUBIN, POC: NORMAL
BLOOD URINE, POC: NORMAL
CLARITY, POC: CLEAR
COLOR, POC: YELLOW
GLUCOSE URINE, POC: NORMAL
KETONES, POC: NORMAL
LEUKOCYTE EST, POC: NORMAL
NITRITE, POC: NORMAL
PH, POC: 6.5
PROTEIN, POC: NORMAL
SPECIFIC GRAVITY, POC: 1.01
UROBILINOGEN, POC: 1

## 2024-02-01 PROCEDURE — 1123F ACP DISCUSS/DSCN MKR DOCD: CPT | Performed by: INTERNAL MEDICINE

## 2024-02-01 PROCEDURE — 81002 URINALYSIS NONAUTO W/O SCOPE: CPT | Performed by: INTERNAL MEDICINE

## 2024-02-01 PROCEDURE — 99213 OFFICE O/P EST LOW 20 MIN: CPT | Performed by: INTERNAL MEDICINE

## 2024-02-01 PROCEDURE — 36415 COLL VENOUS BLD VENIPUNCTURE: CPT | Performed by: INTERNAL MEDICINE

## 2024-02-01 NOTE — PROGRESS NOTES
Shabana Silvestre  YOB: 1940    Date of Service:  2/1/2024    Chief Complaint:      Chief Complaint   Patient presents with    Urinary Frequency     Recheck urine, did not complete antibiotics due to side effects. Still having symptoms    Hypertension    Fatigue       Assessment/Plan:  Shabana was seen today for urinary frequency, hypertension and fatigue.    Diagnoses and all orders for this visit:    Urine frequency  -     POCT Urinalysis no Micro    Leukocytes in urine  -     Culture, Urine    Weakness  -     Comprehensive Metabolic Panel  -     CBC with Auto Differential  -     Vitamin D 25 Hydroxy  -     Vitamin B12        -     TSH    Vitamin D deficiency  -     Vitamin D 25 Hydroxy  Start  vitamin D (ERGOCALCIFEROL) 1.25 MG (44460 UT) CAPS capsule; Take 1 capsule by mouth once a week    Stable and continue on current medications.    Return keep 4/9 VV f/u.      HPI:  Shabana Silvestre is a 83 y.o.    She's still having weakness and some urine frequency/urgency.  No dysuria, flank, fever or chills.She did take 4 days of Bactrim for UTI but stop since she thought it made her dizzy and weak.      Constipation:  Dr. Garcia wants to hold off on colonoscopy and ? EGD for dysphagia.     Mild Asthma with Restrictive [place] ds:  much improved on Trelegy 200 qd and ventolin prn  Allergies:  Stable on Zyrtec 10 mg qd and Flonase 1-2 spray prn.  GERD with esophageal diverticulum/paraesophageal hernia: asymptomatic with increase Prilosec 40 mg daily.  Insomnia:  stable and not want to be on Trazodone 50 mg 1/2 evening as needed due to grogginess  She's tried otc melatonin but not been on any meds.    Seb dermatitis:  stable on nizoral shampoo q3 days.    Lab Results   Component Value Date    LABA1C 5.5 07/06/2020    LABA1C 5.5 12/13/2019    LABA1C 5.8 04/01/2019     Lab Results   Component Value Date     10/10/2022    K 5.1 10/10/2022     10/10/2022    CO2 26 10/10/2022    BUN 15 10/10/2022    CREATININE

## 2024-02-02 DIAGNOSIS — R53.1 WEAKNESS: ICD-10-CM

## 2024-02-02 PROBLEM — E55.9 VITAMIN D DEFICIENCY: Status: ACTIVE | Noted: 2024-02-02

## 2024-02-02 LAB
25(OH)D3 SERPL-MCNC: 19 NG/ML
ALBUMIN SERPL-MCNC: 4.5 G/DL (ref 3.4–5)
ALBUMIN/GLOB SERPL: 1.7 {RATIO} (ref 1.1–2.2)
ALP SERPL-CCNC: 79 U/L (ref 40–129)
ALT SERPL-CCNC: 12 U/L (ref 10–40)
ANION GAP SERPL CALCULATED.3IONS-SCNC: 10 MMOL/L (ref 3–16)
AST SERPL-CCNC: 17 U/L (ref 15–37)
BACTERIA UR CULT: NORMAL
BASOPHILS # BLD: 0.1 K/UL (ref 0–0.2)
BASOPHILS NFR BLD: 1.3 %
BILIRUB SERPL-MCNC: 0.3 MG/DL (ref 0–1)
BUN SERPL-MCNC: 16 MG/DL (ref 7–20)
CALCIUM SERPL-MCNC: 9.2 MG/DL (ref 8.3–10.6)
CHLORIDE SERPL-SCNC: 101 MMOL/L (ref 99–110)
CO2 SERPL-SCNC: 29 MMOL/L (ref 21–32)
CREAT SERPL-MCNC: 0.6 MG/DL (ref 0.6–1.2)
DEPRECATED RDW RBC AUTO: 14.7 % (ref 12.4–15.4)
EOSINOPHIL # BLD: 1 K/UL (ref 0–0.6)
EOSINOPHIL NFR BLD: 14.9 %
GFR SERPLBLD CREATININE-BSD FMLA CKD-EPI: >60 ML/MIN/{1.73_M2}
GLUCOSE SERPL-MCNC: 94 MG/DL (ref 70–99)
HCT VFR BLD AUTO: 40.6 % (ref 36–48)
HGB BLD-MCNC: 13.4 G/DL (ref 12–16)
LYMPHOCYTES # BLD: 1.1 K/UL (ref 1–5.1)
LYMPHOCYTES NFR BLD: 16.8 %
MCH RBC QN AUTO: 27.4 PG (ref 26–34)
MCHC RBC AUTO-ENTMCNC: 32.9 G/DL (ref 31–36)
MCV RBC AUTO: 83.4 FL (ref 80–100)
MONOCYTES # BLD: 0.7 K/UL (ref 0–1.3)
MONOCYTES NFR BLD: 10.6 %
NEUTROPHILS # BLD: 3.8 K/UL (ref 1.7–7.7)
NEUTROPHILS NFR BLD: 56.4 %
PLATELET # BLD AUTO: 247 K/UL (ref 135–450)
PMV BLD AUTO: 9.9 FL (ref 5–10.5)
POTASSIUM SERPL-SCNC: 4.8 MMOL/L (ref 3.5–5.1)
PROT SERPL-MCNC: 7.2 G/DL (ref 6.4–8.2)
RBC # BLD AUTO: 4.87 M/UL (ref 4–5.2)
SODIUM SERPL-SCNC: 140 MMOL/L (ref 136–145)
TSH SERPL DL<=0.005 MIU/L-ACNC: 2.95 UIU/ML (ref 0.27–4.2)
VIT B12 SERPL-MCNC: 592 PG/ML (ref 211–911)
WBC # BLD AUTO: 6.7 K/UL (ref 4–11)

## 2024-02-02 RX ORDER — ERGOCALCIFEROL 1.25 MG/1
50000 CAPSULE ORAL WEEKLY
Qty: 4 CAPSULE | Refills: 0 | Status: SHIPPED | OUTPATIENT
Start: 2024-02-02

## 2024-02-07 ENCOUNTER — NURSE ONLY (OUTPATIENT)
Dept: INTERNAL MEDICINE CLINIC | Age: 84
End: 2024-02-07

## 2024-02-07 ENCOUNTER — TELEPHONE (OUTPATIENT)
Dept: INTERNAL MEDICINE CLINIC | Age: 84
End: 2024-02-07

## 2024-02-07 VITALS — SYSTOLIC BLOOD PRESSURE: 144 MMHG | DIASTOLIC BLOOD PRESSURE: 72 MMHG

## 2024-02-23 ENCOUNTER — HOSPITAL ENCOUNTER (OUTPATIENT)
Dept: GENERAL RADIOLOGY | Age: 84
Discharge: HOME | End: 2024-02-23
Payer: MEDICARE

## 2024-02-23 ENCOUNTER — HOSPITAL ENCOUNTER (OUTPATIENT)
Age: 84
Discharge: HOME | End: 2024-02-23
Payer: MEDICARE

## 2024-02-23 DIAGNOSIS — R07.9 CHEST PAIN, UNSPECIFIED TYPE: ICD-10-CM

## 2024-02-23 PROCEDURE — 71046 X-RAY EXAM CHEST 2 VIEWS: CPT

## 2024-04-09 ENCOUNTER — TELEMEDICINE (OUTPATIENT)
Dept: INTERNAL MEDICINE CLINIC | Age: 84
End: 2024-04-09
Payer: MEDICARE

## 2024-04-09 DIAGNOSIS — J98.4 RESTRICTIVE LUNG DISEASE: ICD-10-CM

## 2024-04-09 DIAGNOSIS — K21.9 GASTROESOPHAGEAL REFLUX DISEASE WITHOUT ESOPHAGITIS: ICD-10-CM

## 2024-04-09 DIAGNOSIS — J45.909 ASTHMA IN ADULT WITHOUT COMPLICATION, UNSPECIFIED ASTHMA SEVERITY, UNSPECIFIED WHETHER PERSISTENT: Primary | ICD-10-CM

## 2024-04-09 DIAGNOSIS — Q39.6 ESOPHAGEAL DIVERTICULAR DISEASE: ICD-10-CM

## 2024-04-09 DIAGNOSIS — J30.89 ENVIRONMENTAL AND SEASONAL ALLERGIES: ICD-10-CM

## 2024-04-09 DIAGNOSIS — K44.9 PARAESOPHAGEAL HERNIA: ICD-10-CM

## 2024-04-09 PROCEDURE — 1123F ACP DISCUSS/DSCN MKR DOCD: CPT | Performed by: INTERNAL MEDICINE

## 2024-04-09 PROCEDURE — 99214 OFFICE O/P EST MOD 30 MIN: CPT | Performed by: INTERNAL MEDICINE

## 2024-04-09 RX ORDER — FLUTICASONE FUROATE, UMECLIDINIUM BROMIDE AND VILANTEROL TRIFENATATE 200; 62.5; 25 UG/1; UG/1; UG/1
1 POWDER RESPIRATORY (INHALATION) DAILY
Qty: 3 EACH | Refills: 1 | Status: SHIPPED | OUTPATIENT
Start: 2024-04-09

## 2024-04-09 NOTE — PROGRESS NOTES
Patient was evaluated through a synchronous (real-time) video encounter. Patient identification was verified at the start of the visit. She (or guardian if applicable) is aware that this is a billable service, which includes applicable co-pays. This visit was conducted with the patient's (and/or legal guardian's) verbal consent. She has not had a related appointment within my department in the past 7 days or scheduled within the next 24 hours.   The patient was located at Home: [address] [place] [id number].  The provider was located at Home (Appt Dept State): [place].    Date of Service:  4/9/2024    Chief Complaint:      Chief Complaint   Patient presents with    Seborrheic Dermatitis    Restrictive Lung Disease    Constipation       Assessment/Plan:    Shabana was seen today for seborrheic dermatitis, restrictive lung disease and constipation.    Diagnoses and all orders for this visit:    Asthma in adult without complication, unspecified asthma severity, unspecified whether persistent  -     fluticasone-umeclidin-vilant (TRELEGY ELLIPTA) 200-62.5-25 MCG/ACT AEPB inhaler; Inhale 1 puff into the lungs daily    Restrictive lung disease  -     fluticasone-umeclidin-vilant (TRELEGY ELLIPTA) 200-62.5-25 MCG/ACT AEPB inhaler; Inhale 1 puff into the lungs daily    Gastroesophageal reflux disease without esophagitis    Esophageal diverticular disease    Paraesophageal hernia    Environmental and seasonal allergies    Stable and continue on current medications.      Return AWV and f/u 10/16.      HPI:  Shabana Silvestre is a 84 y.o.       Mild Asthma with Restrictive [place] ds:  much improved on Trelegy 200 qd and ventolin prn  Allergies:  Stable on Zyrtec 10 mg qd and Flonase 1-2 spray prn.  GERD with esophageal diverticulum/paraesophageal hernia: asymptomatic with increase Prilosec 40 mg daily.  Insomnia:  stable and not want to be on Trazodone 50 mg 1/2 evening as needed due to grogginess  She's tried otc melatonin but

## 2024-05-01 ENCOUNTER — OFFICE VISIT (OUTPATIENT)
Dept: INTERNAL MEDICINE CLINIC | Age: 84
End: 2024-05-01
Payer: MEDICARE

## 2024-05-01 VITALS
HEART RATE: 94 BPM | BODY MASS INDEX: 25.76 KG/M2 | OXYGEN SATURATION: 95 % | HEIGHT: 62 IN | DIASTOLIC BLOOD PRESSURE: 60 MMHG | WEIGHT: 140 LBS | SYSTOLIC BLOOD PRESSURE: 110 MMHG

## 2024-05-01 DIAGNOSIS — S16.1XXA NECK STRAIN, INITIAL ENCOUNTER: ICD-10-CM

## 2024-05-01 DIAGNOSIS — F51.01 PRIMARY INSOMNIA: ICD-10-CM

## 2024-05-01 DIAGNOSIS — R23.2 FLUSHING: Primary | ICD-10-CM

## 2024-05-01 PROCEDURE — 1123F ACP DISCUSS/DSCN MKR DOCD: CPT | Performed by: INTERNAL MEDICINE

## 2024-05-01 PROCEDURE — 99213 OFFICE O/P EST LOW 20 MIN: CPT | Performed by: INTERNAL MEDICINE

## 2024-05-01 NOTE — PROGRESS NOTES
Shabana Silvestre  YOB: 1940    Date of Service:  5/1/2024    Chief Complaint:      Chief Complaint   Patient presents with    Leg Problem     Sore on right leg       Facial Flushing     Neck Pain     Right side          Assessment/Plan:  Shabana was seen today for leg problem, facial flushing  and neck pain.    Diagnoses and all orders for this visit:    Flushing  Stable, observe    Neck strain, initial encounter  Improving    Primary insomnia  She will try over the counter melatonin first.    Return if symptoms worsen or fail to improve.      HPI:  Shabana Silvestre is a 84 y.o.    She complain of her face gets red and hot as if she had a sunburn lasting an hour for couple of months.    She also has a pain in her right neck when she moves her neck.  Pain has improve now.    She also complains of intermittent sores on her leg.  She deny scratching at her legs causing sores.  She does work on weights with her legs that rubs on machine.    She complains of difficulty sleeping at night, only getting 3-4 hrs.    Lab Results   Component Value Date    LABA1C 5.5 07/06/2020    LABA1C 5.5 12/13/2019    LABA1C 5.8 04/01/2019     Lab Results   Component Value Date     02/01/2024    K 4.8 02/01/2024     02/01/2024    CO2 29 02/01/2024    BUN 16 02/01/2024    CREATININE 0.6 02/01/2024    GLUCOSE 94 02/01/2024    CALCIUM 9.2 02/01/2024     Lab Results   Component Value Date/Time    CHOL 185 10/10/2022 08:06 AM    TRIG 89 10/10/2022 08:06 AM    HDL 66 10/10/2022 08:06 AM     Lab Results   Component Value Date    ALT 12 02/01/2024    AST 17 02/01/2024     Lab Results   Component Value Date    TSH 2.95 02/01/2024    TSH 2.33 10/04/2021    T4FREE 1.1 09/17/2015     Lab Results   Component Value Date    WBC 6.7 02/01/2024    HGB 13.4 02/01/2024    HCT 40.6 02/01/2024    MCV 83.4 02/01/2024     02/01/2024     Lab Results   Component Value Date    INR 1.06 05/11/2021      No results found for: \"PSA\"   No

## 2024-05-07 ENCOUNTER — TELEPHONE (OUTPATIENT)
Dept: INTERNAL MEDICINE CLINIC | Age: 84
End: 2024-05-07

## 2024-05-07 NOTE — TELEPHONE ENCOUNTER
FYI  Patient states she is feeling sick after taking Trelegy. Shaky, weak, with nausea. She wants to go back to Advair 100/50. She has one unopened Advair at home to use. She wants to see if that is what is making her feel so \"sick\".

## 2024-06-17 ENCOUNTER — OFFICE VISIT (OUTPATIENT)
Dept: INTERNAL MEDICINE CLINIC | Age: 84
End: 2024-06-17
Payer: MEDICARE

## 2024-06-17 VITALS
OXYGEN SATURATION: 97 % | DIASTOLIC BLOOD PRESSURE: 78 MMHG | WEIGHT: 142.2 LBS | HEART RATE: 79 BPM | BODY MASS INDEX: 26.17 KG/M2 | SYSTOLIC BLOOD PRESSURE: 128 MMHG | HEIGHT: 62 IN

## 2024-06-17 DIAGNOSIS — B96.89 UTI DUE TO KLEBSIELLA SPECIES: Primary | ICD-10-CM

## 2024-06-17 DIAGNOSIS — R35.0 FREQUENCY OF URINATION: ICD-10-CM

## 2024-06-17 DIAGNOSIS — R82.998 LEUKOCYTES IN URINE: ICD-10-CM

## 2024-06-17 DIAGNOSIS — N39.0 UTI DUE TO KLEBSIELLA SPECIES: Primary | ICD-10-CM

## 2024-06-17 LAB
BILIRUBIN, POC: NEGATIVE
BLOOD URINE, POC: NORMAL
CLARITY, POC: CLEAR
COLOR, POC: YELLOW
GLUCOSE URINE, POC: NEGATIVE
KETONES, POC: NEGATIVE
LEUKOCYTE EST, POC: NORMAL
NITRITE, POC: NEGATIVE
PH, POC: 7
PROTEIN, POC: NORMAL
SPECIFIC GRAVITY, POC: 1.01
UROBILINOGEN, POC: 0.2

## 2024-06-17 PROCEDURE — 1123F ACP DISCUSS/DSCN MKR DOCD: CPT | Performed by: INTERNAL MEDICINE

## 2024-06-17 PROCEDURE — 81002 URINALYSIS NONAUTO W/O SCOPE: CPT | Performed by: INTERNAL MEDICINE

## 2024-06-17 PROCEDURE — 99213 OFFICE O/P EST LOW 20 MIN: CPT | Performed by: INTERNAL MEDICINE

## 2024-06-17 RX ORDER — SULFAMETHOXAZOLE AND TRIMETHOPRIM 800; 160 MG/1; MG/1
1 TABLET ORAL 2 TIMES DAILY
Qty: 10 TABLET | Refills: 0 | Status: SHIPPED | OUTPATIENT
Start: 2024-06-17 | End: 2024-06-20 | Stop reason: SINTOL

## 2024-06-17 NOTE — PROGRESS NOTES
Shabana Silvestre  YOB: 1940    Date of Service:  6/17/2024    Chief Complaint:      Chief Complaint   Patient presents with    Urinary Tract Infection     Frequency, burning, couple dayys       Assessment/Plan:  Shabana was seen today for urinary tract infection.    Diagnoses and all orders for this visit:    UTI due to Klebsiella species  -     ciprofloxacin (CIPRO) 500 MG tablet; Take 1 tablet by mouth 2 times daily for 7 days    Frequency of urination  -     POCT Urinalysis no Micro  -     Discontinue: sulfamethoxazole-trimethoprim (BACTRIM DS;SEPTRA DS) 800-160 MG per tablet; Take 1 tablet by mouth 2 times daily for 5 days  -     Culture, Urine    Leukocytes in urine  -     Culture, Urine    Drink plenty of fluids and urinate whenever you have to go (try not to hold) and prior to sexual intercourse to prevent a recurrent infection.  Always wipe from front to back to avoid getting bacteria from your anal area into your kidneys/bladder.      Return if symptoms worsen or fail to improve.      HPI:  Shabana Silvestre is a 84 y.o.    She complain of dysuria, frequency and urgency for 2 days.  No flank pain, blood in urine, fever or chills.  She did get better with Bactrim in January when she had similar symptoms despite negative culture.    Lab Results   Component Value Date    LABA1C 5.5 07/06/2020    LABA1C 5.5 12/13/2019    LABA1C 5.8 04/01/2019     Lab Results   Component Value Date     02/01/2024    K 4.8 02/01/2024     02/01/2024    CO2 29 02/01/2024    BUN 16 02/01/2024    CREATININE 0.6 02/01/2024    GLUCOSE 94 02/01/2024    CALCIUM 9.2 02/01/2024     Lab Results   Component Value Date/Time    CHOL 185 10/10/2022 08:06 AM    TRIG 89 10/10/2022 08:06 AM    HDL 66 10/10/2022 08:06 AM     Lab Results   Component Value Date    ALT 12 02/01/2024    AST 17 02/01/2024     Lab Results   Component Value Date    TSH 2.95 02/01/2024    TSH 2.33 10/04/2021    T4FREE 1.1 09/17/2015     Lab Results

## 2024-06-20 ENCOUNTER — TELEPHONE (OUTPATIENT)
Dept: INTERNAL MEDICINE CLINIC | Age: 84
End: 2024-06-20

## 2024-06-20 LAB — ORGANISM: ABNORMAL

## 2024-06-20 RX ORDER — CIPROFLOXACIN 500 MG/1
500 TABLET, FILM COATED ORAL 2 TIMES DAILY
Qty: 14 TABLET | Refills: 0 | Status: SHIPPED | OUTPATIENT
Start: 2024-06-20 | End: 2024-06-27

## 2024-06-20 NOTE — TELEPHONE ENCOUNTER
Spoke with patient and relayed Dr. Rosa's message regarding new antibiotic being sent to pharmacy. Patient voiced understanding.

## 2024-06-20 NOTE — TELEPHONE ENCOUNTER
Patient started taking Bactrim on 06/17. She states that it is making her extremely nauseous and she can't tolerate it. Has tried only taking half and still feels the same. Has tried taking it with a full glass of water and food and this still doesn't help.    Please advise

## 2024-06-20 NOTE — TELEPHONE ENCOUNTER
Patient states that she is still having some urinary discomfort and frequent urination. She would like something else to be sent in.

## 2024-06-26 ENCOUNTER — APPOINTMENT (OUTPATIENT)
Dept: CT IMAGING | Age: 84
End: 2024-06-26
Payer: MEDICARE

## 2024-06-26 ENCOUNTER — HOSPITAL ENCOUNTER (EMERGENCY)
Age: 84
Discharge: HOME | End: 2024-06-27
Attending: EMERGENCY MEDICINE
Payer: MEDICARE

## 2024-06-26 DIAGNOSIS — M54.6 ACUTE THORACIC BACK PAIN, UNSPECIFIED BACK PAIN LATERALITY: Primary | ICD-10-CM

## 2024-06-26 LAB
ALBUMIN SERPL-MCNC: 4 G/DL (ref 3.4–5)
ALBUMIN/GLOB SERPL: 1.2 {RATIO} (ref 1.1–2.2)
ALP SERPL-CCNC: 70 U/L (ref 40–129)
ALT SERPL-CCNC: 10 U/L (ref 10–40)
ANION GAP SERPL CALCULATED.3IONS-SCNC: 10 MMOL/L (ref 3–16)
AST SERPL-CCNC: 17 U/L (ref 15–37)
BASOPHILS # BLD: 0.1 K/UL (ref 0–0.2)
BASOPHILS NFR BLD: 0.6 %
BILIRUB SERPL-MCNC: <0.2 MG/DL (ref 0–1)
BUN SERPL-MCNC: 12 MG/DL (ref 7–20)
CALCIUM SERPL-MCNC: 8.7 MG/DL (ref 8.3–10.6)
CHLORIDE SERPL-SCNC: 101 MMOL/L (ref 99–110)
CO2 SERPL-SCNC: 26 MMOL/L (ref 21–32)
CREAT SERPL-MCNC: <0.5 MG/DL (ref 0.6–1.2)
DEPRECATED RDW RBC AUTO: 14.6 % (ref 12.4–15.4)
EOSINOPHIL # BLD: 0.4 K/UL (ref 0–0.6)
EOSINOPHIL NFR BLD: 3.8 %
GFR SERPLBLD CREATININE-BSD FMLA CKD-EPI: >90 ML/MIN/{1.73_M2}
GLUCOSE SERPL-MCNC: 107 MG/DL (ref 70–99)
HCT VFR BLD AUTO: 39.4 % (ref 36–48)
HGB BLD-MCNC: 13 G/DL (ref 12–16)
LIPASE SERPL-CCNC: 24 U/L (ref 13–60)
LYMPHOCYTES # BLD: 2.9 K/UL (ref 1–5.1)
LYMPHOCYTES NFR BLD: 27.9 %
MCH RBC QN AUTO: 27.7 PG (ref 26–34)
MCHC RBC AUTO-ENTMCNC: 33.1 G/DL (ref 31–36)
MCV RBC AUTO: 83.5 FL (ref 80–100)
MONOCYTES # BLD: 1.1 K/UL (ref 0–1.3)
MONOCYTES NFR BLD: 10.2 %
NEUTROPHILS # BLD: 6 K/UL (ref 1.7–7.7)
NEUTROPHILS NFR BLD: 57.5 %
PLATELET # BLD AUTO: 264 K/UL (ref 135–450)
PMV BLD AUTO: 8.4 FL (ref 5–10.5)
POTASSIUM SERPL-SCNC: 3.7 MMOL/L (ref 3.5–5.1)
PROT SERPL-MCNC: 7.3 G/DL (ref 6.4–8.2)
RBC # BLD AUTO: 4.71 M/UL (ref 4–5.2)
SODIUM SERPL-SCNC: 137 MMOL/L (ref 136–145)
TROPONIN, HIGH SENSITIVITY: 6 NG/L (ref 0–14)
WBC # BLD AUTO: 10.4 K/UL (ref 4–11)

## 2024-06-26 PROCEDURE — 96375 TX/PRO/DX INJ NEW DRUG ADDON: CPT

## 2024-06-26 PROCEDURE — 6360000002 HC RX W HCPCS: Performed by: PHYSICIAN ASSISTANT

## 2024-06-26 PROCEDURE — 83690 ASSAY OF LIPASE: CPT

## 2024-06-26 PROCEDURE — 36415 COLL VENOUS BLD VENIPUNCTURE: CPT

## 2024-06-26 PROCEDURE — 80053 COMPREHEN METABOLIC PANEL: CPT

## 2024-06-26 PROCEDURE — 93005 ELECTROCARDIOGRAM TRACING: CPT | Performed by: PHYSICIAN ASSISTANT

## 2024-06-26 PROCEDURE — 84484 ASSAY OF TROPONIN QUANT: CPT

## 2024-06-26 PROCEDURE — 99285 EMERGENCY DEPT VISIT HI MDM: CPT

## 2024-06-26 PROCEDURE — 71260 CT THORAX DX C+: CPT

## 2024-06-26 PROCEDURE — 6360000004 HC RX CONTRAST MEDICATION: Performed by: PHYSICIAN ASSISTANT

## 2024-06-26 PROCEDURE — 96374 THER/PROPH/DIAG INJ IV PUSH: CPT

## 2024-06-26 PROCEDURE — 85025 COMPLETE CBC W/AUTO DIFF WBC: CPT

## 2024-06-26 RX ORDER — MORPHINE SULFATE 2 MG/ML
2 INJECTION, SOLUTION INTRAMUSCULAR; INTRAVENOUS ONCE
Status: COMPLETED | OUTPATIENT
Start: 2024-06-26 | End: 2024-06-26

## 2024-06-26 RX ORDER — KETOROLAC TROMETHAMINE 30 MG/ML
15 INJECTION, SOLUTION INTRAMUSCULAR; INTRAVENOUS ONCE
Status: COMPLETED | OUTPATIENT
Start: 2024-06-26 | End: 2024-06-27

## 2024-06-26 RX ORDER — ONDANSETRON 2 MG/ML
4 INJECTION INTRAMUSCULAR; INTRAVENOUS ONCE
Status: COMPLETED | OUTPATIENT
Start: 2024-06-26 | End: 2024-06-26

## 2024-06-26 RX ADMIN — ONDANSETRON 4 MG: 2 INJECTION INTRAMUSCULAR; INTRAVENOUS at 22:24

## 2024-06-26 RX ADMIN — MORPHINE SULFATE 2 MG: 2 INJECTION, SOLUTION INTRAMUSCULAR; INTRAVENOUS at 22:24

## 2024-06-26 RX ADMIN — IOPAMIDOL 75 ML: 755 INJECTION, SOLUTION INTRAVENOUS at 23:34

## 2024-06-26 ASSESSMENT — PAIN - FUNCTIONAL ASSESSMENT: PAIN_FUNCTIONAL_ASSESSMENT: 0-10

## 2024-06-26 ASSESSMENT — PAIN SCALES - GENERAL: PAINLEVEL_OUTOF10: 10

## 2024-06-27 VITALS
OXYGEN SATURATION: 99 % | SYSTOLIC BLOOD PRESSURE: 145 MMHG | TEMPERATURE: 98.4 F | HEIGHT: 62 IN | HEART RATE: 73 BPM | DIASTOLIC BLOOD PRESSURE: 61 MMHG | WEIGHT: 140 LBS | BODY MASS INDEX: 25.76 KG/M2 | RESPIRATION RATE: 16 BRPM

## 2024-06-27 LAB
EKG ATRIAL RATE: 61 BPM
EKG DIAGNOSIS: NORMAL
EKG P AXIS: 13 DEGREES
EKG P-R INTERVAL: 186 MS
EKG Q-T INTERVAL: 420 MS
EKG QRS DURATION: 96 MS
EKG QTC CALCULATION (BAZETT): 422 MS
EKG R AXIS: -25 DEGREES
EKG T AXIS: 18 DEGREES
EKG VENTRICULAR RATE: 61 BPM
TROPONIN, HIGH SENSITIVITY: <6 NG/L (ref 0–14)

## 2024-06-27 PROCEDURE — 2580000003 HC RX 258: Performed by: PHYSICIAN ASSISTANT

## 2024-06-27 PROCEDURE — 6360000002 HC RX W HCPCS: Performed by: PHYSICIAN ASSISTANT

## 2024-06-27 PROCEDURE — 6370000000 HC RX 637 (ALT 250 FOR IP): Performed by: PHYSICIAN ASSISTANT

## 2024-06-27 PROCEDURE — 96375 TX/PRO/DX INJ NEW DRUG ADDON: CPT

## 2024-06-27 PROCEDURE — 84484 ASSAY OF TROPONIN QUANT: CPT

## 2024-06-27 PROCEDURE — 93010 ELECTROCARDIOGRAM REPORT: CPT | Performed by: INTERNAL MEDICINE

## 2024-06-27 PROCEDURE — 36415 COLL VENOUS BLD VENIPUNCTURE: CPT

## 2024-06-27 PROCEDURE — 2500000003 HC RX 250 WO HCPCS: Performed by: PHYSICIAN ASSISTANT

## 2024-06-27 RX ADMIN — KETOROLAC TROMETHAMINE 15 MG: 30 INJECTION INTRAMUSCULAR; INTRAVENOUS at 00:27

## 2024-06-27 RX ADMIN — FAMOTIDINE 20 MG: 10 INJECTION, SOLUTION INTRAVENOUS at 01:18

## 2024-06-27 RX ADMIN — LIDOCAINE HYDROCHLORIDE: 20 SOLUTION ORAL at 01:19

## 2024-06-27 ASSESSMENT — PAIN DESCRIPTION - LOCATION: LOCATION: BACK

## 2024-06-27 ASSESSMENT — PAIN SCALES - GENERAL: PAINLEVEL_OUTOF10: 6

## 2024-06-27 NOTE — ED PROVIDER NOTES
[facility]  ED  EMERGENCY DEPARTMENT ENCOUNTER        Pt Name: Shabana Silvestre  MRN: [id number]  Birthdate 1940  Date of evaluation: 6/26/2024  Provider: ABIMBOLA GUERRERO PA-C  PCP: Ary Rosa MD  ED Attending: Ludmila Shaw MD       I have seen and evaluated this patient with my supervising physician Ludmila Shaw MD.    CHIEF COMPLAINT:     Chief Complaint   Patient presents with    Back Pain     Pt to ED for back/neck pain. Pt states she has a lung disease. Pt drove self to ED. Pt states she has a current UTI and on abx.        HISTORY OF PRESENT ILLNESS:      History provided by the patient. No limitations.    Shabana Silvestre is a 84 y.o. female who arrives to the ED by private vehicle (in fact she drove herself).  Patient is here complaining of upper back pain that began a couple hours prior to arrival.  She denies any fall or injury to her back.  She denies a history of chronic back pain.  Patient denies associated chest pain or shortness of breath.  She does admit to having interstitial lung disease.  She states the pain in her back feels like pleurisy which she has experienced in the past.  The patient follows with pulmonology for her interstitial lung disease.  She also follows closely with GI (at ) secondary to issues with a large hiatal hernia and chronic constipation.  She saw GI just today and had a screening KUB x-ray done.  She also follows closely with PCP.  Her last PCP office visit with Dr. Rosa was on 6/17.  Patient is currently being treated for UTI.  Patient states the antibiotics give her a little nausea.  When she first got here she was nauseous and did dry heave 1 time.  She attributes this to the antibiotics that she is taking.  No fevers or chills.  Again, no chest pain or shortness of breath.  She denies low back pain, just upper back pain.    Nursing Notes were reviewed     REVIEW OF SYSTEMS:     Review of Systems  Positives and pertinent negatives as

## 2024-06-27 NOTE — ED PROVIDER NOTES
[facility]  ED     EMERGENCY DEPARTMENT ENCOUNTER     Location: [facility]  ED  6/26/2024  Note Started: 2:54 AM EDT 6/27/24      Patient Identification  Shabana Silvestre is a 84 y.o. female      HPI:Shabana Silvestre was evaluated in the Emergency Department for back pain.  Patient presently on antibiotics for UTI.  States she has never had back pain before.  No chest pain or shortness of breath.. Although initial history and physical exam information was obtained by AILEEN/NPP/MD/DO (who also dictated a record of this visit), I personally saw the patient and performed and made/approved the management plan and take responsibility for the patient management.      PHYSICAL EXAM:  Nontoxic-appearing adult female in no acute distress heart and lung sounds are normal.  Abdomen soft nontender nondistended.  She has no obvious deformity to the back besides some scoliosis and she has no back tenderness.    EKG Interpretation  Twelve-lead EKG as read and interpreted by myself shows normal sinus rhythm at a rate of 61 bpm, WA interval QRS QTc normal.  Normal axis no acute ischemic changes.  No significant changes when compared to prior EKG May 2021.    Patient seen and evaluated.  Relevant records reviewed.  MDM: elderly female who comes in for back pain diagnostic workup included laboratory studies and chest x-ray.  When no obvious cause was identified a CT PE study was ordered to evaluate for serious pathology including PE or even dissection as cause of her symptoms.  Diagnostic workup unremarkable.  Patient is treated here with multiple doses of pain medication.  Patient reassured.  She does have a history of esophageal dysfunction and hernia which may be contributing to her symptoms she is given GI cocktail and famotidine.  Patient encouraged follow-up in the outpatient setting.  She is agreeable with the treatment plan.    Cardiac monitor as read and interpreted by myself shows normal sinus

## 2024-06-28 ENCOUNTER — CARE COORDINATION (OUTPATIENT)
Dept: CARE COORDINATION | Age: 84
End: 2024-06-28

## 2024-06-28 NOTE — CARE COORDINATION
Ambulatory Care Coordination  ED Follow up Call    Reason for ED visit:  Back Pain   Status:     significantly improved    Did you call your PCP prior to going to the ED?  No      Did you receive a discharge instructions from the Emergency Room? Yes  Review of Instructions:     Understands what to report/when to return?:  Yes   Understands discharge instructions?:  Yes   Following discharge instructions?:  Yes       Are there any new complaints of pain? No  New Pain Meds? No    Constipation prophylaxis needed?  Yes, chronic and sees GI.    If you have a wound is the dressing clean, dry, and intact? N/A  Understands wound care regimen? N/A    Are there any other complaints/concerns that you wish to tell your provider?   N/A    FU appts/Provider:    Future Appointments   Date Time Provider Department Center   10/16/2024  9:30 AM Ary Rosa MD [facility] [place] - D[facility]D           New Medications?:   No      Medication Reconciliation by phone - Yes  Understands Medications?  Yes  Taking Medications? Yes  Can you swallow your pills?  Yes    Any further needs in the home i.e. Equipment?  No    Link to services in community?:  N/A   Which services:  N/A    ACM made outreach following E/D visit. Updated today by Shabana that she feels a lot better today. Stating back pain is minimal now, taking all medications as directed with no barriers. Steady on her feet with no report of falls. Reviewed AVS in detail. No c/o of CP or SOB at this time. Pt did just see PCP not too long ago for UTI. Pt stating that she completed course of ABX given by PCP. Voiding with no barriers. Pt called PCP prior as well following E/D. No other questions or concerns at this time.    Kailyn MELISSAN, RN  Respecting Choices® Advanced Steps ACP Facilitator  Ambulatory Care Manager  [facility]  [phone]

## 2024-07-06 DIAGNOSIS — J45.30 MILD PERSISTENT ASTHMA WITHOUT COMPLICATION: ICD-10-CM

## 2024-07-08 ENCOUNTER — TELEPHONE (OUTPATIENT)
Dept: CASE MANAGEMENT | Age: 84
End: 2024-07-08

## 2024-07-08 DIAGNOSIS — J45.30 MILD PERSISTENT ASTHMA WITHOUT COMPLICATION: Primary | ICD-10-CM

## 2024-07-08 RX ORDER — FLUTICASONE PROPIONATE AND SALMETEROL 100; 50 UG/1; UG/1
POWDER RESPIRATORY (INHALATION)
Qty: 60 EACH | Refills: 0 | OUTPATIENT
Start: 2024-07-08

## 2024-07-08 RX ORDER — FLUTICASONE PROPIONATE AND SALMETEROL 250; 50 UG/1; UG/1
1 POWDER RESPIRATORY (INHALATION) EVERY 12 HOURS
Qty: 60 EACH | Refills: 3 | Status: SHIPPED | OUTPATIENT
Start: 2024-07-08

## 2024-07-08 RX ORDER — CEPHALEXIN 250 MG/1
CAPSULE ORAL
OUTPATIENT
Start: 2024-07-08

## 2024-07-08 NOTE — TELEPHONE ENCOUNTER
CT Chest 6/26/24 with incidental findings that will need f/u imaging    Mild mediastinal and hilar lymphadenopathy which may be reactive in  nature.  Neoplastic etiology cannot be excluded.    Melisa ROJO(R)  Patient Navigator  Incidentals/Lung Navigation  [email or web address]

## 2024-07-09 NOTE — TELEPHONE ENCOUNTER
Spoke with patient and informed her of complete message from . She asked that I send copies to her pulmonologist Dr.Chad Hansen  Ph# [phone]  Fax# [phone]  10 pages faxed to Ancelmo's office

## 2024-07-16 ENCOUNTER — TELEPHONE (OUTPATIENT)
Dept: INTERNAL MEDICINE CLINIC | Age: 84
End: 2024-07-16

## 2024-07-16 NOTE — TELEPHONE ENCOUNTER
Patient picked up Advair at [facility] and noticed it was a different dose. Dose she picked up was 200-50 with directions of twice daily and she was used to taking 100-50 once daily. Wants to know if there is a reason for the difference? She is nervous to try it because of her sensitivity to medications.     Please advise

## 2024-07-16 NOTE — TELEPHONE ENCOUNTER
Spoke with patient and relayed message from Dr. Rosa. She voiced understanding and will give medication a try.

## 2024-08-06 ENCOUNTER — TELEPHONE (OUTPATIENT)
Dept: INTERNAL MEDICINE CLINIC | Age: 84
End: 2024-08-06

## 2024-08-06 NOTE — TELEPHONE ENCOUNTER
Patient calling because she is having abdominal pain, cramping, and nausea. She ate out 6 days ago and awoke with bad cramping, nausea and vomiting. Ate very little for a few days, then ate normally again yesterday and symptoms returned with the exception of vomiting again this AM. Only nausea, abdominal pain and sweating. No diarrhea, just soft stool during the whole episode. She took an omeprazole 40 mg at 7:00 am this morning and again at 12:30 this afternoon with no relief yet.  Advised patient to be evaluated at Urgent Care or ED due to her recent Hx of back and abdominal issues (moderate hiatal hernia).  Patient agreed to go later today or tomorrow, depending on how she feels. Advised her that  is currently out of the office but will check messages in the AM, we will let her know if  recommends anything different.

## 2024-08-07 ENCOUNTER — TELEMEDICINE (OUTPATIENT)
Dept: INTERNAL MEDICINE CLINIC | Age: 84
End: 2024-08-07
Payer: MEDICARE

## 2024-08-07 DIAGNOSIS — R10.84 GENERALIZED ABDOMINAL PAIN: Primary | ICD-10-CM

## 2024-08-07 PROCEDURE — 1123F ACP DISCUSS/DSCN MKR DOCD: CPT | Performed by: INTERNAL MEDICINE

## 2024-08-07 PROCEDURE — 99213 OFFICE O/P EST LOW 20 MIN: CPT | Performed by: INTERNAL MEDICINE

## 2024-10-21 SDOH — HEALTH STABILITY: PHYSICAL HEALTH: ON AVERAGE, HOW MANY MINUTES DO YOU ENGAGE IN EXERCISE AT THIS LEVEL?: 20 MIN

## 2024-10-21 SDOH — HEALTH STABILITY: PHYSICAL HEALTH: ON AVERAGE, HOW MANY DAYS PER WEEK DO YOU ENGAGE IN MODERATE TO STRENUOUS EXERCISE (LIKE A BRISK WALK)?: 1 DAY

## 2024-10-21 ASSESSMENT — LIFESTYLE VARIABLES
HOW OFTEN DO YOU HAVE A DRINK CONTAINING ALCOHOL: MONTHLY OR LESS
HOW OFTEN DO YOU HAVE SIX OR MORE DRINKS ON ONE OCCASION: 1
HOW MANY STANDARD DRINKS CONTAINING ALCOHOL DO YOU HAVE ON A TYPICAL DAY: 1
HOW OFTEN DO YOU HAVE A DRINK CONTAINING ALCOHOL: 2
HOW MANY STANDARD DRINKS CONTAINING ALCOHOL DO YOU HAVE ON A TYPICAL DAY: 1 OR 2

## 2024-10-21 ASSESSMENT — PATIENT HEALTH QUESTIONNAIRE - PHQ9
SUM OF ALL RESPONSES TO PHQ QUESTIONS 1-9: 0
SUM OF ALL RESPONSES TO PHQ QUESTIONS 1-9: 0
SUM OF ALL RESPONSES TO PHQ9 QUESTIONS 1 & 2: 0
2. FEELING DOWN, DEPRESSED OR HOPELESS: NOT AT ALL
SUM OF ALL RESPONSES TO PHQ QUESTIONS 1-9: 0
1. LITTLE INTEREST OR PLEASURE IN DOING THINGS: NOT AT ALL
SUM OF ALL RESPONSES TO PHQ QUESTIONS 1-9: 0

## 2024-10-29 ENCOUNTER — OFFICE VISIT (OUTPATIENT)
Dept: INTERNAL MEDICINE CLINIC | Age: 84
End: 2024-10-29

## 2024-10-29 VITALS
WEIGHT: 136.8 LBS | BODY MASS INDEX: 25.17 KG/M2 | SYSTOLIC BLOOD PRESSURE: 140 MMHG | HEIGHT: 62 IN | HEART RATE: 75 BPM | OXYGEN SATURATION: 100 % | DIASTOLIC BLOOD PRESSURE: 62 MMHG

## 2024-10-29 DIAGNOSIS — K21.9 GASTROESOPHAGEAL REFLUX DISEASE WITHOUT ESOPHAGITIS: ICD-10-CM

## 2024-10-29 DIAGNOSIS — L21.9 SEBORRHEIC DERMATITIS: ICD-10-CM

## 2024-10-29 DIAGNOSIS — E55.9 VITAMIN D DEFICIENCY: ICD-10-CM

## 2024-10-29 DIAGNOSIS — J45.30 MILD PERSISTENT ASTHMA WITHOUT COMPLICATION: ICD-10-CM

## 2024-10-29 DIAGNOSIS — K44.9 PARAESOPHAGEAL HERNIA: ICD-10-CM

## 2024-10-29 DIAGNOSIS — Z00.00 MEDICARE ANNUAL WELLNESS VISIT, SUBSEQUENT: Primary | ICD-10-CM

## 2024-10-29 DIAGNOSIS — Q39.6 ESOPHAGEAL DIVERTICULAR DISEASE: ICD-10-CM

## 2024-10-29 RX ORDER — KETOCONAZOLE 20 MG/ML
SHAMPOO TOPICAL
Qty: 120 ML | Refills: 10 | Status: SHIPPED | OUTPATIENT
Start: 2024-10-29

## 2024-10-29 RX ORDER — FLUTICASONE PROPIONATE AND SALMETEROL 250; 50 UG/1; UG/1
1 POWDER RESPIRATORY (INHALATION) EVERY 12 HOURS
Qty: 60 EACH | Refills: 11 | Status: SHIPPED | OUTPATIENT
Start: 2024-10-29

## 2024-10-29 RX ORDER — CALCIPOTRIENE 50 UG/G
CREAM TOPICAL 2 TIMES DAILY
COMMUNITY

## 2024-10-29 RX ORDER — OMEPRAZOLE 40 MG/1
40 CAPSULE, DELAYED RELEASE ORAL
Qty: 90 CAPSULE | Refills: 1 | Status: SHIPPED | OUTPATIENT
Start: 2024-10-29

## 2024-10-29 SDOH — ECONOMIC STABILITY: FOOD INSECURITY: WITHIN THE PAST 12 MONTHS, THE FOOD YOU BOUGHT JUST DIDN'T LAST AND YOU DIDN'T HAVE MONEY TO GET MORE.: NEVER TRUE

## 2024-10-29 SDOH — ECONOMIC STABILITY: INCOME INSECURITY: HOW HARD IS IT FOR YOU TO PAY FOR THE VERY BASICS LIKE FOOD, HOUSING, MEDICAL CARE, AND HEATING?: NOT HARD AT ALL

## 2024-10-29 SDOH — ECONOMIC STABILITY: FOOD INSECURITY: WITHIN THE PAST 12 MONTHS, YOU WORRIED THAT YOUR FOOD WOULD RUN OUT BEFORE YOU GOT MONEY TO BUY MORE.: NEVER TRUE

## 2024-10-29 NOTE — PROGRESS NOTES
Medicare Annual Wellness Visit    Shabana Silvestre is here for Medicare AWV (fasting)    Assessment & Plan   Medicare annual wellness visit, subsequent  Recommendations for Preventive Services Due: see orders and patient instructions/AVS.  Recommended screening schedule for the next 5-10 years is provided to the patient in written form: see Patient Instructions/AVS.     No follow-ups on file.     Subjective       Patient's complete Health Risk Assessment and screening values have been reviewed and are found in Flowsheets. The following problems were reviewed today and where indicated follow up appointments were made and/or referrals ordered.    Positive Risk Factor Screenings with Interventions:                Inactivity:  On average, how many days per week do you engage in moderate to strenuous exercise (like a brisk walk)?: 1 day (!) Abnormal  On average, how many minutes do you engage in exercise at this level?: 20 min  Interventions:  Patient declined any further interventions or treatment                         Objective   Vitals:    10/29/24 0846   BP: (!) 156/62   Pulse: 75   SpO2: 100%   Weight: 62.1 kg (136 lb 12.8 oz)   Height: 1.575 m (5' 2\")      Body mass index is 25.02 kg/m².                    Allergies   Allergen Reactions    Acetaminophen Nausea And Vomiting    Oxycodone Hcl Nausea And Vomiting    Bactrim [Sulfamethoxazole-Trimethoprim] Nausea Only    Cephalexin      Vomiting    Ciprofloxacin Nausea Only    Oxycodone     Erythromycin Nausea And Vomiting    Pcn [Penicillins] Hives and Nausea And Vomiting     Vomiting and rash on penicillin as a child.     Prior to Visit Medications    Medication Sig Taking? Authorizing Provider   calcipotriene (DOVONEX) 0.005 % cream Apply topically 2 times daily Apply topically 2 times daily. Yes Mitchell Brownlee MD   vitamin D 25 MCG (1000 UT) CAPS Take 1 capsule by mouth daily Yes Mitchell Brownlee MD   fluticasone-salmeterol (ADVAIR) 250-50 MCG/ACT AEPB 
week.    Lab Results   Component Value Date    LABA1C 5.5 07/06/2020    LABA1C 5.5 12/13/2019    LABA1C 5.8 04/01/2019     Lab Results   Component Value Date     06/26/2024    K 3.7 06/26/2024     06/26/2024    CO2 26 06/26/2024    BUN 12 06/26/2024    CREATININE <0.5 (L) 06/26/2024    GLUCOSE 107 (H) 06/26/2024    CALCIUM 8.7 06/26/2024     Lab Results   Component Value Date/Time    CHOL 185 10/10/2022 08:06 AM    TRIG 89 10/10/2022 08:06 AM    HDL 66 10/10/2022 08:06 AM     Lab Results   Component Value Date    ALT 10 06/26/2024    AST 17 06/26/2024     Lab Results   Component Value Date    TSH 2.95 02/01/2024    TSH 2.33 10/04/2021    T4FREE 1.1 09/17/2015     Lab Results   Component Value Date    WBC 10.4 06/26/2024    HGB 13.0 06/26/2024    HCT 39.4 06/26/2024    MCV 83.5 06/26/2024     06/26/2024     Lab Results   Component Value Date    INR 1.06 05/11/2021      No results found for: \"PSA\"   No results found for: \"LABURIC\"     Patient Active Problem List   Diagnosis    Environmental and seasonal allergies    Mild persistent asthma without complication    Recurrent UTI    Hematuria    Numbness and tingling in right hand    Hammer toe of right foot    Arthritis of right knee    Status post total left knee replacement    Paraesophageal hernia    Gastroesophageal reflux disease without esophagitis    Ataxia    Seborrheic dermatitis    Vitamin D deficiency       Allergies   Allergen Reactions    Acetaminophen Nausea And Vomiting    Oxycodone Hcl Nausea And Vomiting    Bactrim [Sulfamethoxazole-Trimethoprim] Nausea Only    Cephalexin      Vomiting    Ciprofloxacin Nausea Only    Oxycodone     Erythromycin Nausea And Vomiting    Pcn [Penicillins] Hives and Nausea And Vomiting     Vomiting and rash on penicillin as a child.     Outpatient Medications Marked as Taking for the 10/29/24 encounter (Office Visit) with Ary Rosa MD   Medication Sig Dispense Refill    calcipotriene (DOVONEX) 0.005 %

## 2024-10-29 NOTE — PATIENT INSTRUCTIONS
Learning About Being Active as an Older Adult  Why is being active important as you get older?     Being active is one of the best things you can do for your health. And it's never too late to start. Being active--or getting active, if you aren't already--has definite benefits. It can:  Give you more energy,  Keep your mind sharp.  Improve balance to reduce your risk of falls.  Help you manage chronic illness with fewer medicines.  No matter how old you are, how fit you are, or what health problems you have, there is a form of activity that will work for you. And the more physical activity you can do, the better your overall health will be.  What kinds of activity can help you stay healthy?  Being more active will make your daily activities easier. Physical activity includes planned exercise and things you do in daily life. There are four types of activity:  Aerobic.  Doing aerobic activity makes your heart and lungs strong.  Includes walking, dancing, and gardening.  Aim for at least 2½ hours spread throughout the week.  It improves your energy and can help you sleep better.  Muscle-strengthening.  This type of activity can help maintain muscle and strengthen bones.  Includes climbing stairs, using resistance bands, and lifting or carrying heavy loads.  Aim for at least twice a week.  It can help protect the knees and other joints.  Stretching.  Stretching gives you better range of motion in joints and muscles.  Includes upper arm stretches, calf stretches, and gentle yoga.  Aim for at least twice a week, preferably after your muscles are warmed up from other activities.  It can help you function better in daily life.  Balancing.  This helps you stay coordinated and have good posture.  Includes heel-to-toe walking, gunnar chi, and certain types of yoga.  Aim for at least 3 days a week.  It can reduce your risk of falling.  Even if you have a hard time meeting the recommendations, it's better to be more active

## 2024-11-13 DIAGNOSIS — K44.9 PARAESOPHAGEAL HERNIA: ICD-10-CM

## 2024-11-13 DIAGNOSIS — Q39.6 ESOPHAGEAL DIVERTICULAR DISEASE: ICD-10-CM

## 2024-11-13 DIAGNOSIS — K21.9 GASTROESOPHAGEAL REFLUX DISEASE WITHOUT ESOPHAGITIS: ICD-10-CM

## 2024-11-13 RX ORDER — OMEPRAZOLE 40 MG/1
40 CAPSULE, DELAYED RELEASE ORAL
Qty: 90 CAPSULE | Refills: 1 | OUTPATIENT
Start: 2024-11-13

## 2025-01-07 ENCOUNTER — TELEMEDICINE (OUTPATIENT)
Dept: INTERNAL MEDICINE CLINIC | Age: 85
End: 2025-01-07

## 2025-01-07 DIAGNOSIS — R41.3 MEMORY LOSS: Primary | ICD-10-CM

## 2025-01-07 RX ORDER — DONEPEZIL HYDROCHLORIDE 5 MG/1
5 TABLET, FILM COATED ORAL NIGHTLY
Qty: 30 TABLET | Refills: 0 | Status: SHIPPED | OUTPATIENT
Start: 2025-01-07

## 2025-01-07 ASSESSMENT — PATIENT HEALTH QUESTIONNAIRE - PHQ9
SUM OF ALL RESPONSES TO PHQ QUESTIONS 1-9: 0
SUM OF ALL RESPONSES TO PHQ9 QUESTIONS 1 & 2: 0
SUM OF ALL RESPONSES TO PHQ QUESTIONS 1-9: 0
SUM OF ALL RESPONSES TO PHQ QUESTIONS 1-9: 0
2. FEELING DOWN, DEPRESSED OR HOPELESS: NOT AT ALL
1. LITTLE INTEREST OR PLEASURE IN DOING THINGS: NOT AT ALL
SUM OF ALL RESPONSES TO PHQ QUESTIONS 1-9: 0

## 2025-01-07 NOTE — PROGRESS NOTES
release capsule Take 1 capsule by mouth every morning (before breakfast) 90 capsule 1    polyethylene glycol (GLYCOLAX) 17 GM/SCOOP powder Every 3-4 days as needed constipation 510 g 0    fluticasone (FLONASE) 50 MCG/ACT nasal spray 1 spray by Each Nostril route 2 times daily 3 each 3    albuterol sulfate HFA (VENTOLIN HFA) 108 (90 Base) MCG/ACT inhaler Inhale 2 puffs into the lungs 4 times daily as needed for Wheezing 18 g 2    aspirin 81 MG EC tablet Take 1 tablet by mouth daily 30 tablet 3    Multiple Vitamins-Minerals (CENTRUM SILVER 50+WOMEN) TABS Take by mouth daily      Cetirizine HCl (ALL DAY ALLERGY PO) Take  by mouth daily.           Review of Systems: 14 systems were negative except of what was stated on HPI    Nursing note and vitals reviewed.  There were no vitals filed for this visit.  Wt Readings from Last 3 Encounters:   10/29/24 62.1 kg (136 lb 12.8 oz)   06/26/24 63.5 kg (140 lb)   06/17/24 64.5 kg (142 lb 3.2 oz)     BP Readings from Last 3 Encounters:   10/29/24 (!) 140/62   06/27/24 (!) 145/61   06/17/24 128/78     There is no height or weight on file to calculate BMI.  Constitutional: Patient appears well-developed and well-nourished. No distress.   Head: Normocephalic and atraumatic.   Psychiatric: Normal mood and affect. Behavior is normal.

## 2025-02-11 ENCOUNTER — TELEPHONE (OUTPATIENT)
Dept: INTERNAL MEDICINE CLINIC | Age: 85
End: 2025-02-11

## 2025-02-11 NOTE — TELEPHONE ENCOUNTER
Patient calling for referral to dermatologist for her eczema and scalp psoriasis. Advised an appointment with  if need an official referral, or can do at her 4-23-25 f/up.  Gave #'s for [facility], [facility], and [facility] ([place], [place]). If they require a referral from us, this will require an appointment with

## 2025-04-15 ENCOUNTER — OFFICE VISIT (OUTPATIENT)
Dept: INTERNAL MEDICINE CLINIC | Age: 85
End: 2025-04-15
Payer: MEDICARE

## 2025-04-15 VITALS
BODY MASS INDEX: 24.66 KG/M2 | WEIGHT: 134 LBS | SYSTOLIC BLOOD PRESSURE: 148 MMHG | HEART RATE: 97 BPM | OXYGEN SATURATION: 95 % | HEIGHT: 62 IN | DIASTOLIC BLOOD PRESSURE: 78 MMHG

## 2025-04-15 DIAGNOSIS — J98.4 RESTRICTIVE LUNG DISEASE: ICD-10-CM

## 2025-04-15 DIAGNOSIS — Z00.00 MEDICARE ANNUAL WELLNESS VISIT, SUBSEQUENT: Primary | ICD-10-CM

## 2025-04-15 DIAGNOSIS — B96.89 SINUSITIS, BACTERIAL: ICD-10-CM

## 2025-04-15 DIAGNOSIS — J45.909 ASTHMA IN ADULT WITHOUT COMPLICATION, UNSPECIFIED ASTHMA SEVERITY, UNSPECIFIED WHETHER PERSISTENT: ICD-10-CM

## 2025-04-15 DIAGNOSIS — Q39.6 ESOPHAGEAL DIVERTICULAR DISEASE: ICD-10-CM

## 2025-04-15 DIAGNOSIS — J32.9 SINUSITIS, BACTERIAL: ICD-10-CM

## 2025-04-15 DIAGNOSIS — J45.30 MILD PERSISTENT ASTHMA WITHOUT COMPLICATION: ICD-10-CM

## 2025-04-15 DIAGNOSIS — Z13.220 SCREENING FOR CHOLESTEROL LEVEL: ICD-10-CM

## 2025-04-15 DIAGNOSIS — K21.9 GASTROESOPHAGEAL REFLUX DISEASE WITHOUT ESOPHAGITIS: ICD-10-CM

## 2025-04-15 DIAGNOSIS — K44.9 PARAESOPHAGEAL HERNIA: ICD-10-CM

## 2025-04-15 PROCEDURE — 1159F MED LIST DOCD IN RCRD: CPT | Performed by: INTERNAL MEDICINE

## 2025-04-15 PROCEDURE — G0439 PPPS, SUBSEQ VISIT: HCPCS | Performed by: INTERNAL MEDICINE

## 2025-04-15 PROCEDURE — 1160F RVW MEDS BY RX/DR IN RCRD: CPT | Performed by: INTERNAL MEDICINE

## 2025-04-15 PROCEDURE — 99214 OFFICE O/P EST MOD 30 MIN: CPT | Performed by: INTERNAL MEDICINE

## 2025-04-15 PROCEDURE — 1123F ACP DISCUSS/DSCN MKR DOCD: CPT | Performed by: INTERNAL MEDICINE

## 2025-04-15 RX ORDER — AZITHROMYCIN 250 MG/1
TABLET, FILM COATED ORAL
Qty: 6 TABLET | Refills: 0 | Status: SHIPPED | OUTPATIENT
Start: 2025-04-15 | End: 2025-04-25

## 2025-04-15 RX ORDER — OMEPRAZOLE 40 MG/1
40 CAPSULE, DELAYED RELEASE ORAL
Qty: 90 CAPSULE | Refills: 1 | Status: SHIPPED | OUTPATIENT
Start: 2025-04-15

## 2025-04-15 RX ORDER — ALBUTEROL SULFATE 90 UG/1
2 INHALANT RESPIRATORY (INHALATION) 4 TIMES DAILY PRN
Qty: 18 G | Refills: 2 | Status: SHIPPED | OUTPATIENT
Start: 2025-04-15

## 2025-04-15 SDOH — ECONOMIC STABILITY: FOOD INSECURITY: WITHIN THE PAST 12 MONTHS, THE FOOD YOU BOUGHT JUST DIDN'T LAST AND YOU DIDN'T HAVE MONEY TO GET MORE.: NEVER TRUE

## 2025-04-15 SDOH — ECONOMIC STABILITY: FOOD INSECURITY: WITHIN THE PAST 12 MONTHS, YOU WORRIED THAT YOUR FOOD WOULD RUN OUT BEFORE YOU GOT MONEY TO BUY MORE.: NEVER TRUE

## 2025-04-15 ASSESSMENT — PATIENT HEALTH QUESTIONNAIRE - PHQ9
SUM OF ALL RESPONSES TO PHQ QUESTIONS 1-9: 2
1. LITTLE INTEREST OR PLEASURE IN DOING THINGS: SEVERAL DAYS
2. FEELING DOWN, DEPRESSED OR HOPELESS: SEVERAL DAYS
SUM OF ALL RESPONSES TO PHQ QUESTIONS 1-9: 2

## 2025-04-15 ASSESSMENT — LIFESTYLE VARIABLES
HOW MANY STANDARD DRINKS CONTAINING ALCOHOL DO YOU HAVE ON A TYPICAL DAY: 1 OR 2
HOW OFTEN DO YOU HAVE A DRINK CONTAINING ALCOHOL: MONTHLY OR LESS

## 2025-04-15 NOTE — PROGRESS NOTES
Shabana Silvestre  YOB: 1940    Date of Service:  4/15/2025    Chief Complaint:      Chief Complaint   Patient presents with    Pain     Rt sided head pain onset about 4 days ago. Feels like \"infection\"    Eye Drainage     Crusty in AM, drainage    Fatigue     Increased  in past week    Facial Pain     Sinus pain and pressure     Medicare AWV       Assessment/Plan:  Shabana was seen today for pain, eye drainage, fatigue, facial pain and medicare awv.    Diagnoses and all orders for this visit:    Medicare annual wellness visit, subsequent    Screening for cholesterol level  -     Lipid Panel; Future    Sinusitis, bacterial  -     azithromycin (ZITHROMAX) 250 MG tablet; 500mg on day 1 followed by 250mg on days 2 - 5    Mild persistent asthma without complication  -     Comprehensive Metabolic Panel; Future  -     CBC with Auto Differential; Future    Gastroesophageal reflux disease without esophagitis  -     omeprazole (PRILOSEC) 40 MG delayed release capsule; Take 1 capsule by mouth every morning (before breakfast)    Esophageal diverticular disease  -     omeprazole (PRILOSEC) 40 MG delayed release capsule; Take 1 capsule by mouth every morning (before breakfast)    Paraesophageal hernia  -     omeprazole (PRILOSEC) 40 MG delayed release capsule; Take 1 capsule by mouth every morning (before breakfast)    Asthma in adult without complication, unspecified asthma severity, unspecified whether persistent  -     albuterol sulfate HFA (VENTOLIN HFA) 108 (90 Base) MCG/ACT inhaler; Inhale 2 puffs into the lungs 4 times daily as needed for Wheezing    Restrictive lung disease  -     albuterol sulfate HFA (VENTOLIN HFA) 108 (90 Base) MCG/ACT inhaler; Inhale 2 puffs into the lungs 4 times daily as needed for Wheezing  -     Handicap Placard MISC; by Does not apply route  MAY 2030      Stable and continue on current medications.    No follow-ups on file.      HPI:  Shabana Silvestre is a 85 y.o.    She complain 
   Medication Sig Taking? Authorizing Provider   calcipotriene (DOVONEX) 0.005 % cream Apply topically 2 times daily Apply topically 2 times daily. Yes Mitchell Brownlee MD   vitamin D 25 MCG (1000 UT) CAPS Take 1 capsule by mouth daily Yes Mitchell Brownlee MD   fluticasone-salmeterol (ADVAIR) 250-50 MCG/ACT AEPB diskus inhaler Inhale 1 puff into the lungs in the morning and 1 puff in the evening. Yes Ary Rosa MD   ketoconazole (NIZORAL) 2 % shampoo Apply shampoo three times weekly (M, W, F) leave in 3-5 min, then rinse. May alternate with other shampoos Yes Ary Rosa MD   omeprazole (PRILOSEC) 40 MG delayed release capsule Take 1 capsule by mouth every morning (before breakfast) Yes Ary Rosa MD   polyethylene glycol (GLYCOLAX) 17 GM/SCOOP powder Every 3-4 days as needed constipation Yes Ary Rosa MD   fluticasone (FLONASE) 50 MCG/ACT nasal spray 1 spray by Each Nostril route 2 times daily Yes Ary Rosa MD   albuterol sulfate HFA (VENTOLIN HFA) 108 (90 Base) MCG/ACT inhaler Inhale 2 puffs into the lungs 4 times daily as needed for Wheezing Yes Ary Rosa MD   aspirin 81 MG EC tablet Take 1 tablet by mouth daily Yes Katie Lee, APRN - CNP   Multiple Vitamins-Minerals (CENTRUM SILVER 50+WOMEN) TABS Take by mouth daily Yes Mitchell Brownlee MD   Cetirizine HCl (ALL DAY ALLERGY PO) Take  by mouth daily. Yes Mitchell Brownlee MD       CareTeam (Including outside providers/suppliers regularly involved in providing care):   Patient Care Team:  Ary Rosa MD as PCP - General (Internal Medicine)  Ary Rosa MD as PCP - Empaneled Provider  Stanfield, Denver Thomas, MD (Orthopedic Surgery)  Bc Trammell DPM as Consulting Physician (Podiatry)  Mita Greene PA-C as Physician Assistant (Orthopedic Surgery)     Recommendations for Preventive Services Due: see orders and patient instructions/AVS.  Recommended screening schedule for the next 5-10 years is

## 2025-04-19 ENCOUNTER — HOSPITAL ENCOUNTER (OUTPATIENT)
Age: 85
Discharge: HOME | End: 2025-04-19
Payer: MEDICARE

## 2025-04-19 DIAGNOSIS — J45.30 MILD PERSISTENT ASTHMA WITHOUT COMPLICATION: ICD-10-CM

## 2025-04-19 DIAGNOSIS — Z13.220 SCREENING FOR CHOLESTEROL LEVEL: ICD-10-CM

## 2025-04-19 LAB
ALBUMIN SERPL-MCNC: 3.6 G/DL (ref 3.4–5)
ALBUMIN/GLOB SERPL: 1.2 {RATIO} (ref 1.1–2.2)
ALP SERPL-CCNC: 77 U/L (ref 40–129)
ALT SERPL-CCNC: 10 U/L (ref 10–40)
ANION GAP SERPL CALCULATED.3IONS-SCNC: 9 MMOL/L (ref 3–16)
AST SERPL-CCNC: 21 U/L (ref 15–37)
BASOPHILS # BLD: 0.3 K/UL (ref 0–0.2)
BASOPHILS NFR BLD: 5.2 %
BILIRUB SERPL-MCNC: 0.4 MG/DL (ref 0–1)
BUN SERPL-MCNC: 18 MG/DL (ref 7–20)
CALCIUM SERPL-MCNC: 8.8 MG/DL (ref 8.3–10.6)
CHLORIDE SERPL-SCNC: 106 MMOL/L (ref 99–110)
CHOLEST SERPL-MCNC: 151 MG/DL (ref 0–199)
CO2 SERPL-SCNC: 25 MMOL/L (ref 21–32)
CREAT SERPL-MCNC: 0.6 MG/DL (ref 0.6–1.2)
DEPRECATED RDW RBC AUTO: 14.5 % (ref 12.4–15.4)
EOSINOPHIL # BLD: 0.9 K/UL (ref 0–0.6)
EOSINOPHIL NFR BLD: 14.9 %
GFR SERPLBLD CREATININE-BSD FMLA CKD-EPI: 87 ML/MIN/{1.73_M2}
GLUCOSE SERPL-MCNC: 90 MG/DL (ref 70–99)
HCT VFR BLD AUTO: 38.9 % (ref 36–48)
HDLC SERPL-MCNC: 57 MG/DL (ref 40–60)
HGB BLD-MCNC: 12.6 G/DL (ref 12–16)
LDLC SERPL CALC-MCNC: 80 MG/DL
LYMPHOCYTES # BLD: 1.9 K/UL (ref 1–5.1)
LYMPHOCYTES NFR BLD: 32.3 %
MCH RBC QN AUTO: 26.5 PG (ref 26–34)
MCHC RBC AUTO-ENTMCNC: 32.3 G/DL (ref 31–36)
MCV RBC AUTO: 82.3 FL (ref 80–100)
MONOCYTES # BLD: 0.6 K/UL (ref 0–1.3)
MONOCYTES NFR BLD: 10.9 %
NEUTROPHILS # BLD: 2.2 K/UL (ref 1.7–7.7)
NEUTROPHILS NFR BLD: 36.7 %
PLATELET # BLD AUTO: 172 K/UL (ref 135–450)
PMV BLD AUTO: 10.6 FL (ref 5–10.5)
POTASSIUM SERPL-SCNC: 4 MMOL/L (ref 3.5–5.1)
PROT SERPL-MCNC: 6.7 G/DL (ref 6.4–8.2)
RBC # BLD AUTO: 4.73 M/UL (ref 4–5.2)
SODIUM SERPL-SCNC: 140 MMOL/L (ref 136–145)
TRIGL SERPL-MCNC: 68 MG/DL (ref 0–150)
VLDLC SERPL CALC-MCNC: 14 MG/DL
WBC # BLD AUTO: 5.9 K/UL (ref 4–11)

## 2025-04-19 PROCEDURE — 36415 COLL VENOUS BLD VENIPUNCTURE: CPT

## 2025-04-19 PROCEDURE — 85025 COMPLETE CBC W/AUTO DIFF WBC: CPT

## 2025-04-19 PROCEDURE — 80061 LIPID PANEL: CPT

## 2025-04-19 PROCEDURE — 80053 COMPREHEN METABOLIC PANEL: CPT

## 2025-04-21 ENCOUNTER — RESULTS FOLLOW-UP (OUTPATIENT)
Dept: INTERNAL MEDICINE CLINIC | Age: 85
End: 2025-04-21

## 2025-04-21 ENCOUNTER — TELEPHONE (OUTPATIENT)
Dept: INTERNAL MEDICINE CLINIC | Age: 85
End: 2025-04-21

## 2025-04-21 NOTE — TELEPHONE ENCOUNTER
Patient called and wanted to verify if she was still suppose to come to her visit on 4/23/25 because she was under the impression this appointment was cancelled. Patient asked for clarification before showing up.     Patient also wanted to inform  that she completed her blood work.

## 2025-07-14 ENCOUNTER — OFFICE VISIT (OUTPATIENT)
Dept: INTERNAL MEDICINE CLINIC | Age: 85
End: 2025-07-14
Payer: MEDICARE

## 2025-07-14 VITALS
WEIGHT: 130 LBS | SYSTOLIC BLOOD PRESSURE: 138 MMHG | HEIGHT: 62 IN | HEART RATE: 77 BPM | OXYGEN SATURATION: 96 % | BODY MASS INDEX: 23.92 KG/M2 | DIASTOLIC BLOOD PRESSURE: 78 MMHG

## 2025-07-14 DIAGNOSIS — J84.9 INTERSTITIAL LUNG DISEASE (HCC): ICD-10-CM

## 2025-07-14 DIAGNOSIS — K21.9 GASTROESOPHAGEAL REFLUX DISEASE, UNSPECIFIED WHETHER ESOPHAGITIS PRESENT: ICD-10-CM

## 2025-07-14 DIAGNOSIS — H90.0 CONDUCTIVE HEARING LOSS, BILATERAL: Primary | ICD-10-CM

## 2025-07-14 PROCEDURE — 1123F ACP DISCUSS/DSCN MKR DOCD: CPT

## 2025-07-14 PROCEDURE — 1159F MED LIST DOCD IN RCRD: CPT

## 2025-07-14 PROCEDURE — 99214 OFFICE O/P EST MOD 30 MIN: CPT

## 2025-07-14 NOTE — PROGRESS NOTES
[facility]  [address]  [address]  [place] [id number]  Tel: [phone]    Shabana Silvestre  1940  female    CC:   Chief Complaint   Patient presents with    New Patient       HPI:   Patient is an 85-year-old female presents today to establish care with me.  Denies any acute complaints at this time.    Uses Advair discus with some relief of symptoms.  Does not get short of breath frequently but does use oxygen when ambulating.    ILD - oxygen as needed; 2 years diagnosis,  pulmonology, coughing a lot; uses O2 when walking or physical activity at home  Hiatal hernia - causes symptoms, poor surgical candidate    Is interested in preventative health as well as maintaining good longevity.  Endorses some chronic hearing loss slightly worse on the left some worsening over the past several months.    Social Hx:  Retired 2 years ago, LISW-S for inpatient mental health. Not smoking. No alcohol.  passed 15-16 years ago, from heart disease. 4 kids, 10 grandchildren. Lives with youngest son.     Past Medical History:   Diagnosis Date    Allergic rhinitis     Arthritis     Asthma     mild    Cancer (HCC)     skin    Chronic back pain Early Dec. 2023    mild middle back periodically    GERD (gastroesophageal reflux disease)     Hematuria, microscopic 02/2018    Knee joint disorder 2018    left knee injection x3    Knee pain, right     PONV (postoperative nausea and vomiting)     Reflux     Seasonal allergies     Tinnitus        Past Surgical History:   Procedure Laterality Date    CATARACT REMOVAL WITH IMPLANT Right 04/05/2019    CATARACT REMOVAL WITH IMPLANT Left 06/27/2019     PHACO EMULSIFICATION OF CATARACT WITH INTRAOCULAR LENS IMPLANT EYE    COLONOSCOPY  2009    COLONOSCOPY  09/17/2014    polyp    CYSTOSCOPY  02/2018    DILATION AND CURETTAGE OF UTERUS      FOOT SURGERY Bilateral [id number]    rt ft 3rd digit proximalinterphalangeal joint arthrodesis/lt  ft 5th/2nd proximal

## 2025-08-07 ENCOUNTER — PROCEDURE VISIT (OUTPATIENT)
Dept: AUDIOLOGY | Age: 85
End: 2025-08-07
Payer: MEDICARE

## 2025-08-07 ENCOUNTER — OFFICE VISIT (OUTPATIENT)
Dept: ENT CLINIC | Age: 85
End: 2025-08-07
Payer: MEDICARE

## 2025-08-07 VITALS
WEIGHT: 130 LBS | BODY MASS INDEX: 23.92 KG/M2 | HEIGHT: 62 IN | DIASTOLIC BLOOD PRESSURE: 78 MMHG | SYSTOLIC BLOOD PRESSURE: 129 MMHG | HEART RATE: 84 BPM

## 2025-08-07 DIAGNOSIS — H61.22 IMPACTED CERUMEN OF LEFT EAR: ICD-10-CM

## 2025-08-07 DIAGNOSIS — H90.3 SENSORINEURAL HEARING LOSS (SNHL) OF BOTH EARS: Primary | ICD-10-CM

## 2025-08-07 DIAGNOSIS — H93.13 TINNITUS OF BOTH EARS: ICD-10-CM

## 2025-08-07 PROCEDURE — 1123F ACP DISCUSS/DSCN MKR DOCD: CPT | Performed by: STUDENT IN AN ORGANIZED HEALTH CARE EDUCATION/TRAINING PROGRAM

## 2025-08-07 PROCEDURE — 99204 OFFICE O/P NEW MOD 45 MIN: CPT | Performed by: STUDENT IN AN ORGANIZED HEALTH CARE EDUCATION/TRAINING PROGRAM

## 2025-08-07 PROCEDURE — 69210 REMOVE IMPACTED EAR WAX UNI: CPT | Performed by: STUDENT IN AN ORGANIZED HEALTH CARE EDUCATION/TRAINING PROGRAM

## 2025-08-07 PROCEDURE — 1160F RVW MEDS BY RX/DR IN RCRD: CPT | Performed by: STUDENT IN AN ORGANIZED HEALTH CARE EDUCATION/TRAINING PROGRAM

## 2025-08-07 PROCEDURE — 92557 COMPREHENSIVE HEARING TEST: CPT

## 2025-08-07 PROCEDURE — 92567 TYMPANOMETRY: CPT

## 2025-08-07 PROCEDURE — 1159F MED LIST DOCD IN RCRD: CPT | Performed by: STUDENT IN AN ORGANIZED HEALTH CARE EDUCATION/TRAINING PROGRAM

## 2025-08-07 ASSESSMENT — ENCOUNTER SYMPTOMS
EYE PAIN: 0
RHINORRHEA: 0
SHORTNESS OF BREATH: 0
NAUSEA: 0
VOMITING: 0
COUGH: 0

## (undated) DEVICE — GAUZE,SPONGE,4"X4",8PLY,STRL,LF,10/TRAY: Brand: MEDLINE

## (undated) DEVICE — CANNULA NSL 13FT TUBE AD ETCO2 DIV SAMP M

## (undated) DEVICE — 1010 S-DRAPE TOWEL DRAPE 10/BX: Brand: STERI-DRAPE™

## (undated) DEVICE — 6 ML SYRINGE LUER-LOCK TIP: Brand: MONOJECT

## (undated) DEVICE — Device

## (undated) DEVICE — SURGICAL PROCEDURE PACK EYE CLERMONT

## (undated) DEVICE — PREP SOL PVP IODINE 4%  4 OZ/BTL

## (undated) DEVICE — ENDOSCOPIC KIT 2 12 FT OP4 DE2 GWN SYR

## (undated) DEVICE — NEEDLE HYPO 25GA L1.5IN BLU POLYPR HUB S STL REG BVL STR

## (undated) DEVICE — CONMED SCOPE SAVER BITE BLOCK, 20X27 MM: Brand: SCOPE SAVER

## (undated) DEVICE — GLOVE ORANGE PI 8   MSG9080

## (undated) DEVICE — SOLUTION IV IRRIG WATER 1000ML POUR BRL 2F7114

## (undated) DEVICE — ELECTRODE ECG MONITR FOAM TEAR DROP ADLT RED